# Patient Record
Sex: MALE | Race: WHITE | NOT HISPANIC OR LATINO | Employment: OTHER | URBAN - METROPOLITAN AREA
[De-identification: names, ages, dates, MRNs, and addresses within clinical notes are randomized per-mention and may not be internally consistent; named-entity substitution may affect disease eponyms.]

---

## 2017-02-18 ENCOUNTER — COMMUNICATION - HEALTHEAST (OUTPATIENT)
Dept: CARDIOLOGY | Facility: CLINIC | Age: 77
End: 2017-02-18

## 2017-02-18 DIAGNOSIS — E78.5 HYPERLIPIDEMIA: ICD-10-CM

## 2017-04-03 ENCOUNTER — OFFICE VISIT - HEALTHEAST (OUTPATIENT)
Dept: CARDIOLOGY | Facility: CLINIC | Age: 77
End: 2017-04-03

## 2017-04-03 DIAGNOSIS — I10 ESSENTIAL HYPERTENSION: ICD-10-CM

## 2017-04-03 DIAGNOSIS — I77.9 BILATERAL CAROTID ARTERY DISEASE (H): ICD-10-CM

## 2017-04-03 DIAGNOSIS — R01.1 MURMUR, CARDIAC: ICD-10-CM

## 2017-04-03 ASSESSMENT — MIFFLIN-ST. JEOR: SCORE: 1465.15

## 2017-05-06 ENCOUNTER — COMMUNICATION - HEALTHEAST (OUTPATIENT)
Dept: CARDIOLOGY | Facility: CLINIC | Age: 77
End: 2017-05-06

## 2017-05-06 DIAGNOSIS — I10 ESSENTIAL HYPERTENSION, BENIGN: ICD-10-CM

## 2017-05-16 ENCOUNTER — AMBULATORY - HEALTHEAST (OUTPATIENT)
Dept: LAB | Facility: HOSPITAL | Age: 77
End: 2017-05-16

## 2017-05-16 ENCOUNTER — HOSPITAL ENCOUNTER (OUTPATIENT)
Dept: CARDIOLOGY | Facility: HOSPITAL | Age: 77
Discharge: HOME | End: 2017-05-16
Attending: INTERNAL MEDICINE

## 2017-05-16 DIAGNOSIS — R01.1 MURMUR, CARDIAC: ICD-10-CM

## 2017-05-16 DIAGNOSIS — E78.5 HYPERLIPEMIA: ICD-10-CM

## 2017-05-16 LAB
AORTIC ROOT: 3.3 CM
AORTIC VALVE MEAN VELOCITY: 103 CM/S
ASCENDING AORTA: 3.9 CM
AV DIMENSIONLESS INDEX VTI: 0.8
AV MEAN GRADIENT: 5 MMHG
AV PEAK GRADIENT: 8.1 MMHG
AV VALVE AREA: 2.2 CM2
AV VELOCITY RATIO: 0.8
BSA FOR ECHO PROCEDURE: 1.9 M2
CHOLEST SERPL-MCNC: 193 MG/DL
DOP CALC AO PEAK VEL: 142 CM/S
DOP CALC AO VTI: 35 CM
DOP CALC LVOT AREA: 2.54 CM2
DOP CALC LVOT DIAMETER: 1.8 CM
DOP CALC LVOT PEAK VEL: 115 CM/S
DOP CALC LVOT STROKE VOLUME: 75.3 CM3
DOP CALCLVOT PEAK VEL VTI: 29.6 CM
EJECTION FRACTION: 56 % (ref 55–75)
FASTING STATUS PATIENT QL REPORTED: YES
FRACTIONAL SHORTENING: 32.9 % (ref 28–44)
HDLC SERPL-MCNC: 77 MG/DL
INTERVENTRICULAR SEPTUM IN END DIASTOLE: 1.27 CM (ref 0.6–1)
IVS/PW RATIO: 1.1
LA AREA 1: 24 CM2
LA AREA 2: 20.6 CM2
LDLC SERPL CALC-MCNC: 108 MG/DL
LEFT ATRIUM LENGTH: 5.5 CM
LEFT ATRIUM SIZE: 3.1 CM
LEFT ATRIUM VOLUME INDEX: 40.2 ML/M2
LEFT ATRIUM VOLUME: 76.4 CM3
LEFT VENTRICLE DIASTOLIC VOLUME INDEX: 39.3 CM3/M2 (ref 34–74)
LEFT VENTRICLE DIASTOLIC VOLUME: 74.7 CM3 (ref 62–150)
LEFT VENTRICLE MASS INDEX: 85.4 G/M2
LEFT VENTRICLE SYSTOLIC VOLUME INDEX: 17.2 CM3/M2 (ref 11–31)
LEFT VENTRICLE SYSTOLIC VOLUME: 32.7 CM3 (ref 21–61)
LEFT VENTRICULAR INTERNAL DIMENSION IN DIASTOLE: 3.98 CM (ref 4.2–5.8)
LEFT VENTRICULAR INTERNAL DIMENSION IN SYSTOLE: 2.67 CM (ref 2.5–4)
LEFT VENTRICULAR MASS: 162.2 G
LEFT VENTRICULAR OUTFLOW TRACT MEAN GRADIENT: 3 MMHG
LEFT VENTRICULAR OUTFLOW TRACT MEAN VELOCITY: 84.3 CM/S
LEFT VENTRICULAR OUTFLOW TRACT PEAK GRADIENT: 5 MMHG
LEFT VENTRICULAR POSTERIOR WALL IN END DIASTOLE: 1.11 CM (ref 0.6–1)
LV STROKE VOLUME INDEX: 39.6 ML/M2
MITRAL VALVE E/A RATIO: 0.9
MV AVERAGE E/E' RATIO: 9.9 CM/S
MV DECELERATION TIME: 299 MS
MV E'TISSUE VEL-LAT: 11 CM/S
MV E'TISSUE VEL-MED: 7.16 CM/S
MV LATERAL E/E' RATIO: 8.2
MV MEDIAL E/E' RATIO: 12.5
MV PEAK A VELOCITY: 102 CM/S
MV PEAK E VELOCITY: 89.7 CM/S
PR MAX PG: 5 MMHG
PR PEAK VELOCITY: 113 CM/S
PV PEAK GRADIENT: 6.7 MMHG
PV VMAX: 129 CM/S
TRICUSPID REGURGITATION PEAK PRESSURE GRADIENT: 24.4 MMHG
TRICUSPID VALVE ANULAR PLANE SYSTOLIC EXCURSION: 1.8 CM
TRICUSPID VALVE PEAK REGURGITANT VELOCITY: 247 CM/S
TRIGL SERPL-MCNC: 38 MG/DL

## 2017-06-28 ENCOUNTER — COMMUNICATION - HEALTHEAST (OUTPATIENT)
Dept: CARDIOLOGY | Facility: CLINIC | Age: 77
End: 2017-06-28

## 2017-06-28 DIAGNOSIS — E78.5 HYPERLIPIDEMIA: ICD-10-CM

## 2017-10-15 ENCOUNTER — COMMUNICATION - HEALTHEAST (OUTPATIENT)
Dept: CARDIOLOGY | Facility: CLINIC | Age: 77
End: 2017-10-15

## 2017-10-15 DIAGNOSIS — I10 HTN (HYPERTENSION): ICD-10-CM

## 2018-03-27 ENCOUNTER — COMMUNICATION - HEALTHEAST (OUTPATIENT)
Dept: CARDIOLOGY | Facility: CLINIC | Age: 78
End: 2018-03-27

## 2018-03-27 DIAGNOSIS — I10 HTN (HYPERTENSION): ICD-10-CM

## 2018-04-23 ENCOUNTER — COMMUNICATION - HEALTHEAST (OUTPATIENT)
Dept: ADMINISTRATIVE | Facility: CLINIC | Age: 78
End: 2018-04-23

## 2018-07-16 ENCOUNTER — OFFICE VISIT - HEALTHEAST (OUTPATIENT)
Dept: CARDIOLOGY | Facility: CLINIC | Age: 78
End: 2018-07-16

## 2018-07-16 ENCOUNTER — AMBULATORY - HEALTHEAST (OUTPATIENT)
Dept: CARDIOLOGY | Facility: CLINIC | Age: 78
End: 2018-07-16

## 2018-07-16 DIAGNOSIS — I77.9 BILATERAL CAROTID ARTERY DISEASE (H): ICD-10-CM

## 2018-07-16 DIAGNOSIS — I10 ESSENTIAL HYPERTENSION: ICD-10-CM

## 2018-07-16 DIAGNOSIS — R55 SYNCOPE, UNSPECIFIED SYNCOPE TYPE: ICD-10-CM

## 2018-07-16 DIAGNOSIS — R55 SYNCOPE AND COLLAPSE: ICD-10-CM

## 2018-07-16 ASSESSMENT — MIFFLIN-ST. JEOR: SCORE: 1478.76

## 2018-07-20 ENCOUNTER — HOSPITAL ENCOUNTER (OUTPATIENT)
Dept: ULTRASOUND IMAGING | Facility: HOSPITAL | Age: 78
Discharge: HOME | End: 2018-07-20
Attending: INTERNAL MEDICINE

## 2018-07-20 ENCOUNTER — AMBULATORY - HEALTHEAST (OUTPATIENT)
Dept: LAB | Facility: HOSPITAL | Age: 78
End: 2018-07-20

## 2018-07-20 DIAGNOSIS — R55 SYNCOPE AND COLLAPSE: ICD-10-CM

## 2018-07-20 DIAGNOSIS — I10 ESSENTIAL HYPERTENSION: ICD-10-CM

## 2018-07-20 DIAGNOSIS — I77.9 BILATERAL CAROTID ARTERY DISEASE (H): ICD-10-CM

## 2018-07-20 LAB
CHOLEST SERPL-MCNC: 213 MG/DL
FASTING STATUS PATIENT QL REPORTED: YES
HDLC SERPL-MCNC: 67 MG/DL
LDLC SERPL CALC-MCNC: 132 MG/DL
TRIGL SERPL-MCNC: 69 MG/DL

## 2018-07-23 ENCOUNTER — COMMUNICATION - HEALTHEAST (OUTPATIENT)
Dept: CARDIOLOGY | Facility: CLINIC | Age: 78
End: 2018-07-23

## 2018-07-23 DIAGNOSIS — E78.5 DYSLIPIDEMIA (HIGH LDL; LOW HDL): ICD-10-CM

## 2018-11-02 ENCOUNTER — COMMUNICATION - HEALTHEAST (OUTPATIENT)
Dept: CARDIOLOGY | Facility: CLINIC | Age: 78
End: 2018-11-02

## 2018-11-02 DIAGNOSIS — I10 ESSENTIAL HYPERTENSION, BENIGN: ICD-10-CM

## 2019-05-21 ENCOUNTER — COMMUNICATION - HEALTHEAST (OUTPATIENT)
Dept: CARDIOLOGY | Facility: CLINIC | Age: 79
End: 2019-05-21

## 2019-05-21 DIAGNOSIS — I10 ESSENTIAL HYPERTENSION, BENIGN: ICD-10-CM

## 2019-05-23 ENCOUNTER — COMMUNICATION - HEALTHEAST (OUTPATIENT)
Dept: CARDIOLOGY | Facility: CLINIC | Age: 79
End: 2019-05-23

## 2019-05-23 DIAGNOSIS — I10 ESSENTIAL HYPERTENSION, BENIGN: ICD-10-CM

## 2019-07-26 ENCOUNTER — COMMUNICATION - HEALTHEAST (OUTPATIENT)
Dept: ADMINISTRATIVE | Facility: CLINIC | Age: 79
End: 2019-07-26

## 2021-05-30 VITALS — WEIGHT: 178 LBS | HEIGHT: 66 IN | BODY MASS INDEX: 28.61 KG/M2

## 2021-06-01 VITALS — WEIGHT: 181 LBS | BODY MASS INDEX: 29.09 KG/M2 | HEIGHT: 66 IN

## 2021-06-15 PROBLEM — R01.1 MURMUR, CARDIAC: Status: ACTIVE | Noted: 2017-04-03

## 2021-06-16 PROBLEM — R55 SYNCOPE, UNSPECIFIED SYNCOPE TYPE: Status: ACTIVE | Noted: 2018-07-16

## 2021-06-19 NOTE — LETTER
Letter by Nikolay Olson MD at      Author: Nikolay Olson MD Service: -- Author Type: --    Filed:  Encounter Date: 7/26/2019 Status: (Other)         Andrew Diazdelroy  [address] [facility] [address]      July 26, 2019      Dear Andrew,    This letter is to remind you that you will be due for your follow up appointment with Dr. Nikolay Olson  . To help ensure you are in the best health possible, a regular follow-up with your cardiologist is essential.     Please call our Patient Scheduling Line at [phone] to schedule your appointment at your earliest convenience.  If you have recently scheduled an appointment, please disregard this letter.    We look forward to seeing you again. As always, we are available at the number  above for any questions or concerns you may have.      Sincerely,     The Physicians and Staff of [facility]

## 2021-06-20 ENCOUNTER — HEALTH MAINTENANCE LETTER (OUTPATIENT)
Age: 81
End: 2021-06-20

## 2021-06-25 NOTE — PROGRESS NOTES
Progress Notes by Nikolay Olson MD at 4/3/2017  7:50 AM     Author: Nikolay Olson MD Service: -- Author Type: Physician    Filed: 4/3/2017  8:13 AM Encounter Date: 4/3/2017 Status: Signed    : Nikolay Olson MD (Physician)           Click to link to [facility] HEART CARE NOTE    Thank you, Dr. Domingo, for asking us to see Andrew Haines at the [facility].      Assessment/Recommendations   She with known hypertension as well as carotid plaque.  His blood pressures a bit high today and we talked about 3 options.  Option one would be to increase his lisinopril.  Option 2 would be to increase his physical activity and call us with some blood pressures in 6 weeks.  Option 3 would be to increase the lisinopril only if his blood pressure does not come down to be consistently below 140 in the next 6 weeks.  He opted for option 1 and will increase his physical activity.  He will call us in about 6 weeks with some blood pressures that we can respond to at that time.    I also heard a new systolic murmur on his physical exam today and we will obtain an echocardiogram to evaluate this.  He will be back in [place] in May which would be a good timing to get his blood pressures at that time as well.    When we get the echocardiogram we will also look for his most recent lipid panel.    Thank you for allowing us to participate in his care.         History of Present Illness    Mr. Andrew Haines is a 76 y.o. male with known hypertension as well as mild carotid plaquing in the past.  We have been treating him with pravastatin as well as antihypertensive agents.  He has been feeling well.  He plays golf on a regular basis without difficulty.  He can walk and lift light weights without difficulty.  He has not noticed any changes in his functional capacity.  He denies chest discomfort, unusual shortness of breath with activity, orthopnea, paroxysmal nocturnal  dyspnea, peripheral edema, syncope or near syncopal episodes.  He denies palpitations or chest discomfort.  He does not recall when he last had a carotid ultrasound but thinks it was done in the [place] area.  He has not been as active as he has in the past and is picked up a few pounds.         Physical Examination Review of Systems   Vitals:    04/03/17 0748   BP: 148/78   Pulse: 100   Resp: 16     Body mass index is 28.73 kg/(m^2).  Wt Readings from Last 3 Encounters:   04/03/17 178 lb (80.7 kg)   01/11/16 164 lb (74.4 kg)   12/15/14 177 lb (80.3 kg)     General Appearance:   Alert, cooperative and in no acute distress.   ENT/Mouth: Oral mucuos membranes pink and moist .      EYES:  No scleral icterus. No Xanthelasma.    Neck: JVP normal. No Hepatojugular reflux. Thyroid not visualized   Chest/Lungs:   Lungs are clear to auscultation, equal chest wall expansion    Cardiovascular:   S1, S2 with 2/6 systolic murmur , no clicks or rubs. Brachial, radial and posterior tibial pulses are intact and symetric. No carotid bruits noted   Abdomen:  Nontender. BS+. No bruits.      Extremities: No cyanosis, clubbing or edema   Skin: no xanthelasma, warm.    Psych: Appropriate affect.   Neurologic: normal gait, normal  bilateral, no tremors        General: WNL  Eyes: WNL  Ears/Nose/Throat: WNL  Lungs: WNL  Heart: WNL  Stomach: WNL  Bladder: WNL  Muscle/Joints: Joint Pain  Skin: WNL  Nervous System: WNL  Mental Health: WNL     Blood: WNL     Medical History  Surgical History Family History Social History   History reviewed. No pertinent past medical history. History reviewed. No pertinent surgical history. History reviewed. No pertinent family history. Social History     Social History   ? Marital status:      Spouse name: N/A   ? Number of children: N/A   ? Years of education: N/A     Occupational History   ? Not on file.     Social History Main Topics   ? Smoking status: Former Smoker   ? Smokeless tobacco:  Never Used   ? Alcohol use Not on file   ? Drug use: No   ? Sexual activity: Not on file     Other Topics Concern   ? Not on file     Social History Narrative          Medications  Allergies   Current Outpatient Prescriptions   Medication Sig Dispense Refill   ? aspirin (ASPIRIN LOW DOSE) 81 MG EC tablet Take 81 mg by mouth daily.     ? calcium carbonate-vitamin D3 (CALCIUM 600 + D,3,) 600 mg calcium- 200 unit cap Take 2 tablets by mouth daily.      ? ibuprofen (ADVIL,MOTRIN) 200 MG tablet Take 200 mg by mouth every 6 (six) hours as needed for pain.     ? lisinopril (PRINIVIL,ZESTRIL) 10 MG tablet TAKE 1 TABLET (10 MG TOTAL) BY MOUTH DAILY. 90 tablet 0   ? omeprazole (PRILOSEC) 20 MG capsule Take 1 capsule by mouth as needed.  1   ? pravastatin (PRAVACHOL) 40 MG tablet TAKE 1 TABLET (40 MG TOTAL) BY MOUTH BEDTIME. 90 tablet 0     No current facility-administered medications for this visit.       Allergies   Allergen Reactions   ? Penicillins          Lab Results    Chemistry/lipid CBC Cardiac Enzymes/BNP/TSH/INR   Lab Results   Component Value Date    CHOL 217 (H) 12/05/2016    HDL 83 12/05/2016    LDLCALC 122 12/05/2016    TRIG 60 12/05/2016    CREATININE 1.00 12/27/2012    BUN 20 12/27/2012    K 5.0 12/27/2012     12/27/2012     12/27/2012    CO2 24 12/27/2012    No results found for: WBC, HGB, HCT, MCV, PLT No results found for: CKTOTAL, CKMB, CKMBINDEX, TROPONINI, BNP, TSH, INR

## 2021-06-26 NOTE — PROGRESS NOTES
Progress Notes by Nikolay Olson MD at 7/16/2018  8:50 AM     Author: Nikolay Olson MD Service: -- Author Type: Physician    Filed: 7/16/2018  9:18 AM Encounter Date: 7/16/2018 Status: Signed    : Nikolay Olson MD (Physician)           Click to link to [facility] HEART CARE NOTE    Thank you, Dr. Domingo, for asking us to see Andrew Haines at the [facility].      Assessment/Recommendations   Patient with known carotid artery disease, hypertension and on repeat check today is closer to goal.  His blood pressures at home of generally been well under 140 and many in the 120s.  Picking up his exercise at think his blood pressure will continue to improve.    He is not on any statin medication at this time and we will recheck a fasting lipid panel in the near future.  Because of his history of carotid disease, we will recheck a carotid ultrasound and because of his syncopal episode we will check a Holter monitor.  I would also like to get a 12-lead ECG.    We will call him with results of the above and any further recommendations.  He will continue to exercise on a regular basis and I have commended him for this.         History of Present Illness    Mr. Andrew Haines is a 77 y.o. male with known history of mild plaquing in the carotid arteries, hypertension, and being treated for elevated cholesterol.  He stopped his pravastatin because he was just having a lot of tightening up of his lower extremities and found it difficult to walk.  After several weeks to a couple of months this feeling went away.  He struggled over the past year with a nonhealing ulcer which finally healed in May of this year.  It took a year.  It was quite dramatic.  He did not exercise at that time because he was told not to exercise.  He picked up some weight.  He is now started exercise and uses a , goes to fitness gym, and plays tennis and golf.  He gets an exercise  at least 4 times each week at this point in time.  He has not had a recent lipid panel.  He has not had a carotid ultrasound that I can see since 2012.    He denies any chest discomfort, orthopnea, paroxysmal nocturnal dyspnea but does get some peripheral edema in his right lower extremity, the extremity with the ulcer that is now healed.  He has not had any palpitations.  He did have some falls while in [place].  2 falls were tripping and one he feels like he just passed out.  He was evaluated in [place] and nothing was found.  He does not recall having a monitor of any type.  He has not had any recurrent syncopal or near syncopal episodes since being back to [place].         Physical Examination Review of Systems   Vitals:    07/16/18 0905   BP: 142/78   Pulse:    Resp:      Body mass index is 29.21 kg/(m^2).  Wt Readings from Last 3 Encounters:   07/16/18 181 lb (82.1 kg)   04/03/17 178 lb (80.7 kg)   01/11/16 164 lb (74.4 kg)     General Appearance:   Alert, cooperative and in no acute distress.   ENT/Mouth: Oral mucuos membranes pink and moist .      EYES:  No scleral icterus. No Xanthelasma.    Neck: JVP normal. No Hepatojugular reflux. Thyroid not visualized   Chest/Lungs:   Lungs are clear to auscultation, equal chest wall expansion    Cardiovascular:   S1, S2 without murmur ,clicks or rubs. Brachial, radial pulses are intact and symetric. No carotid bruits noted   Abdomen:  Nontender. BS+.       Extremities: No cyanosis, clubbing in the right lower extremity pretibial edema is noted   Skin: no xanthelasma, warm.    Psych: Appropriate affect.   Neurologic: normal gait, normal  bilateral, no tremors        General: WNL  Eyes: WNL  Ears/Nose/Throat: WNL  Lungs: WNL  Heart: Leg Swelling  Stomach: WNL  Bladder: WNL  Muscle/Joints: WNL  Skin: WNL  Nervous System: Falls  Mental Health: WNL     Blood: WNL     Medical History  Surgical History Family History Social History   No past medical history  on file. No past surgical history on file. No family history on file. Social History     Social History   ? Marital status:      Spouse name: N/A   ? Number of children: N/A   ? Years of education: N/A     Occupational History   ? Not on file.     Social History Main Topics   ? Smoking status: Former Smoker   ? Smokeless tobacco: Never Used   ? Alcohol use Not on file   ? Drug use: No   ? Sexual activity: Not on file     Other Topics Concern   ? Not on file     Social History Narrative          Medications  Allergies   Current Outpatient Prescriptions   Medication Sig Dispense Refill   ? calcium carbonate-vitamin D3 (CALCIUM 600 + D,3,) 600 mg calcium- 200 unit cap Take 2 tablets by mouth daily.      ? celecoxib (CELEBREX) 50 MG capsule Take 100 mg by mouth daily.  30   ? lisinopril (PRINIVIL,ZESTRIL) 10 MG tablet TAKE 1 TABLET (10 MG TOTAL) BY MOUTH DAILY. 90 tablet 0   ? omeprazole (PRILOSEC) 20 MG capsule Take 1 capsule by mouth as needed.  1   ? aspirin (ASPIRIN LOW DOSE) 81 MG EC tablet Take 81 mg by mouth daily.     ? ibuprofen (ADVIL,MOTRIN) 200 MG tablet Take 200 mg by mouth every 6 (six) hours as needed for pain.     ? pravastatin (PRAVACHOL) 40 MG tablet TAKE 1 TABLET (40 MG TOTAL) BY MOUTH BEDTIME. 90 tablet 0     No current facility-administered medications for this visit.       Allergies   Allergen Reactions   ? Penicillins          Lab Results    Chemistry/lipid CBC Cardiac Enzymes/BNP/TSH/INR   Lab Results   Component Value Date    CHOL 193 05/16/2017    HDL 77 05/16/2017    LDLCALC 108 05/16/2017    TRIG 38 05/16/2017    CREATININE 1.00 12/27/2012    BUN 20 12/27/2012    K 5.0 12/27/2012     12/27/2012     12/27/2012    CO2 24 12/27/2012    No results found for: WBC, HGB, HCT, MCV, PLT No results found for: CKTOTAL, CKMB, CKMBINDEX, TROPONINI, BNP, TSH, INR

## 2021-10-10 ENCOUNTER — HEALTH MAINTENANCE LETTER (OUTPATIENT)
Age: 81
End: 2021-10-10

## 2022-07-17 ENCOUNTER — HEALTH MAINTENANCE LETTER (OUTPATIENT)
Age: 82
End: 2022-07-17

## 2022-09-24 ENCOUNTER — HEALTH MAINTENANCE LETTER (OUTPATIENT)
Age: 82
End: 2022-09-24

## 2023-08-05 ENCOUNTER — HEALTH MAINTENANCE LETTER (OUTPATIENT)
Age: 83
End: 2023-08-05

## 2024-07-07 ENCOUNTER — HOSPITAL ENCOUNTER (INPATIENT)
Facility: HOSPITAL | Age: 84
LOS: 9 days | Discharge: NURSING, REHAB OR LONG-TERM CARE | DRG: 101 | End: 2024-07-16
Attending: EMERGENCY MEDICINE | Admitting: STUDENT IN AN ORGANIZED HEALTH CARE EDUCATION/TRAINING PROGRAM
Payer: COMMERCIAL

## 2024-07-07 ENCOUNTER — APPOINTMENT (OUTPATIENT)
Dept: CT IMAGING | Facility: HOSPITAL | Age: 84
DRG: 101 | End: 2024-07-07
Attending: EMERGENCY MEDICINE
Payer: COMMERCIAL

## 2024-07-07 DIAGNOSIS — R46.89 SPELL OF BEHAVIOR CHANGE: ICD-10-CM

## 2024-07-07 PROBLEM — G40.209 PARTIAL SYMPTOMATIC EPILEPSY WITH COMPLEX PARTIAL SEIZURES, NOT INTRACTABLE, WITHOUT STATUS EPILEPTICUS (H): Status: ACTIVE | Noted: 2020-10-23

## 2024-07-07 PROBLEM — I44.2 THIRD DEGREE HEART BLOCK (H): Status: ACTIVE | Noted: 2020-07-02

## 2024-07-07 PROBLEM — I48.0 PAROXYSMAL ATRIAL FIBRILLATION (H): Status: ACTIVE | Noted: 2021-06-14

## 2024-07-07 LAB
ANION GAP SERPL CALCULATED.3IONS-SCNC: 10 MMOL/L (ref 7–15)
APTT PPP: 24 SECONDS (ref 22–38)
BASOPHILS # BLD AUTO: 0.1 10E3/UL (ref 0–0.2)
BASOPHILS NFR BLD AUTO: 1 %
BUN SERPL-MCNC: 18.9 MG/DL (ref 8–23)
CALCIUM SERPL-MCNC: 9.1 MG/DL (ref 8.8–10.2)
CHLORIDE SERPL-SCNC: 98 MMOL/L (ref 98–107)
CHOLEST SERPL-MCNC: 133 MG/DL
CREAT SERPL-MCNC: 1.17 MG/DL (ref 0.67–1.17)
DEPRECATED HCO3 PLAS-SCNC: 25 MMOL/L (ref 22–29)
EGFRCR SERPLBLD CKD-EPI 2021: 62 ML/MIN/1.73M2
EOSINOPHIL # BLD AUTO: 0.8 10E3/UL (ref 0–0.7)
EOSINOPHIL NFR BLD AUTO: 10 %
ERYTHROCYTE [DISTWIDTH] IN BLOOD BY AUTOMATED COUNT: 13.2 % (ref 10–15)
GLUCOSE BLDC GLUCOMTR-MCNC: 101 MG/DL (ref 70–99)
GLUCOSE SERPL-MCNC: 98 MG/DL (ref 70–99)
HBA1C MFR BLD: 5.8 %
HCT VFR BLD AUTO: 41.8 % (ref 40–53)
HDLC SERPL-MCNC: 52 MG/DL
HGB BLD-MCNC: 14 G/DL (ref 13.3–17.7)
IMM GRANULOCYTES # BLD: 0 10E3/UL
IMM GRANULOCYTES NFR BLD: 0 %
INR PPP: 1.02 (ref 0.85–1.15)
LDLC SERPL CALC-MCNC: 71 MG/DL
LEVETIRACETAM SERPL-MCNC: 28.5 ΜG/ML (ref 10–40)
LYMPHOCYTES # BLD AUTO: 2.7 10E3/UL (ref 0.8–5.3)
LYMPHOCYTES NFR BLD AUTO: 32 %
MCH RBC QN AUTO: 31.2 PG (ref 26.5–33)
MCHC RBC AUTO-ENTMCNC: 33.5 G/DL (ref 31.5–36.5)
MCV RBC AUTO: 93 FL (ref 78–100)
MONOCYTES # BLD AUTO: 0.8 10E3/UL (ref 0–1.3)
MONOCYTES NFR BLD AUTO: 10 %
NEUTROPHILS # BLD AUTO: 3.9 10E3/UL (ref 1.6–8.3)
NEUTROPHILS NFR BLD AUTO: 47 %
NONHDLC SERPL-MCNC: 81 MG/DL
NRBC # BLD AUTO: 0 10E3/UL
NRBC BLD AUTO-RTO: 0 /100
PLATELET # BLD AUTO: 203 10E3/UL (ref 150–450)
POTASSIUM SERPL-SCNC: 4.7 MMOL/L (ref 3.4–5.3)
RBC # BLD AUTO: 4.49 10E6/UL (ref 4.4–5.9)
SODIUM SERPL-SCNC: 133 MMOL/L (ref 135–145)
TRIGL SERPL-MCNC: 50 MG/DL
TROPONIN T SERPL HS-MCNC: 16 NG/L
WBC # BLD AUTO: 8.4 10E3/UL (ref 4–11)

## 2024-07-07 PROCEDURE — 83036 HEMOGLOBIN GLYCOSYLATED A1C: CPT | Performed by: STUDENT IN AN ORGANIZED HEALTH CARE EDUCATION/TRAINING PROGRAM

## 2024-07-07 PROCEDURE — 99223 1ST HOSP IP/OBS HIGH 75: CPT | Performed by: STUDENT IN AN ORGANIZED HEALTH CARE EDUCATION/TRAINING PROGRAM

## 2024-07-07 PROCEDURE — 250N000009 HC RX 250: Performed by: EMERGENCY MEDICINE

## 2024-07-07 PROCEDURE — 120N000001 HC R&B MED SURG/OB

## 2024-07-07 PROCEDURE — 70496 CT ANGIOGRAPHY HEAD: CPT | Mod: MA

## 2024-07-07 PROCEDURE — 93005 ELECTROCARDIOGRAM TRACING: CPT | Performed by: EMERGENCY MEDICINE

## 2024-07-07 PROCEDURE — 85730 THROMBOPLASTIN TIME PARTIAL: CPT | Performed by: EMERGENCY MEDICINE

## 2024-07-07 PROCEDURE — 82962 GLUCOSE BLOOD TEST: CPT

## 2024-07-07 PROCEDURE — 84484 ASSAY OF TROPONIN QUANT: CPT | Performed by: EMERGENCY MEDICINE

## 2024-07-07 PROCEDURE — 85025 COMPLETE CBC W/AUTO DIFF WBC: CPT | Performed by: EMERGENCY MEDICINE

## 2024-07-07 PROCEDURE — 80048 BASIC METABOLIC PNL TOTAL CA: CPT | Performed by: EMERGENCY MEDICINE

## 2024-07-07 PROCEDURE — 80061 LIPID PANEL: CPT | Performed by: STUDENT IN AN ORGANIZED HEALTH CARE EDUCATION/TRAINING PROGRAM

## 2024-07-07 PROCEDURE — 36415 COLL VENOUS BLD VENIPUNCTURE: CPT | Performed by: EMERGENCY MEDICINE

## 2024-07-07 PROCEDURE — 80177 DRUG SCRN QUAN LEVETIRACETAM: CPT | Performed by: EMERGENCY MEDICINE

## 2024-07-07 PROCEDURE — 250N000011 HC RX IP 250 OP 636: Performed by: EMERGENCY MEDICINE

## 2024-07-07 PROCEDURE — 99291 CRITICAL CARE FIRST HOUR: CPT | Mod: 25

## 2024-07-07 PROCEDURE — 85610 PROTHROMBIN TIME: CPT | Performed by: EMERGENCY MEDICINE

## 2024-07-07 RX ORDER — ASPIRIN 325 MG
325 TABLET ORAL DAILY
Status: DISCONTINUED | OUTPATIENT
Start: 2024-07-08 | End: 2024-07-16 | Stop reason: HOSPADM

## 2024-07-07 RX ORDER — PANTOPRAZOLE SODIUM 40 MG/1
40 TABLET, DELAYED RELEASE ORAL DAILY PRN
Status: DISCONTINUED | OUTPATIENT
Start: 2024-07-07 | End: 2024-07-16 | Stop reason: HOSPADM

## 2024-07-07 RX ORDER — IOPAMIDOL 755 MG/ML
67 INJECTION, SOLUTION INTRAVASCULAR ONCE
Status: COMPLETED | OUTPATIENT
Start: 2024-07-07 | End: 2024-07-07

## 2024-07-07 RX ORDER — LEVETIRACETAM 500 MG/1
1000 TABLET ORAL 2 TIMES DAILY
Status: DISCONTINUED | OUTPATIENT
Start: 2024-07-08 | End: 2024-07-16 | Stop reason: HOSPADM

## 2024-07-07 RX ORDER — LEVETIRACETAM 1000 MG/1
1000 TABLET ORAL 2 TIMES DAILY
COMMUNITY

## 2024-07-07 RX ORDER — METOPROLOL SUCCINATE 50 MG/1
50 TABLET, EXTENDED RELEASE ORAL DAILY
Status: DISCONTINUED | OUTPATIENT
Start: 2024-07-08 | End: 2024-07-16 | Stop reason: HOSPADM

## 2024-07-07 RX ORDER — ONDANSETRON 2 MG/ML
4 INJECTION INTRAMUSCULAR; INTRAVENOUS EVERY 6 HOURS PRN
Status: DISCONTINUED | OUTPATIENT
Start: 2024-07-07 | End: 2024-07-16 | Stop reason: HOSPADM

## 2024-07-07 RX ORDER — ONDANSETRON 4 MG/1
4 TABLET, ORALLY DISINTEGRATING ORAL EVERY 6 HOURS PRN
Status: DISCONTINUED | OUTPATIENT
Start: 2024-07-07 | End: 2024-07-16 | Stop reason: HOSPADM

## 2024-07-07 RX ORDER — LIDOCAINE 40 MG/G
CREAM TOPICAL
Status: DISCONTINUED | OUTPATIENT
Start: 2024-07-07 | End: 2024-07-16 | Stop reason: HOSPADM

## 2024-07-07 RX ORDER — ROSUVASTATIN CALCIUM 10 MG/1
10 TABLET, COATED ORAL DAILY
Status: DISCONTINUED | OUTPATIENT
Start: 2024-07-08 | End: 2024-07-16 | Stop reason: HOSPADM

## 2024-07-07 RX ORDER — HYDRALAZINE HYDROCHLORIDE 20 MG/ML
5 INJECTION INTRAMUSCULAR; INTRAVENOUS EVERY 4 HOURS PRN
Status: DISCONTINUED | OUTPATIENT
Start: 2024-07-07 | End: 2024-07-16 | Stop reason: HOSPADM

## 2024-07-07 RX ADMIN — SODIUM CHLORIDE 100 ML: 9 INJECTION, SOLUTION INTRAVENOUS at 19:29

## 2024-07-07 RX ADMIN — IOPAMIDOL 67 ML: 755 INJECTION, SOLUTION INTRAVENOUS at 19:28

## 2024-07-07 ASSESSMENT — ACTIVITIES OF DAILY LIVING (ADL)
ADLS_ACUITY_SCORE: 35

## 2024-07-07 ASSESSMENT — COLUMBIA-SUICIDE SEVERITY RATING SCALE - C-SSRS
6. HAVE YOU EVER DONE ANYTHING, STARTED TO DO ANYTHING, OR PREPARED TO DO ANYTHING TO END YOUR LIFE?: NO
2. HAVE YOU ACTUALLY HAD ANY THOUGHTS OF KILLING YOURSELF IN THE PAST MONTH?: NO
1. IN THE PAST MONTH, HAVE YOU WISHED YOU WERE DEAD OR WISHED YOU COULD GO TO SLEEP AND NOT WAKE UP?: NO

## 2024-07-08 ENCOUNTER — APPOINTMENT (OUTPATIENT)
Dept: CARDIOLOGY | Facility: HOSPITAL | Age: 84
DRG: 101 | End: 2024-07-08
Attending: STUDENT IN AN ORGANIZED HEALTH CARE EDUCATION/TRAINING PROGRAM
Payer: COMMERCIAL

## 2024-07-08 ENCOUNTER — TRANSFERRED RECORDS (OUTPATIENT)
Dept: HEALTH INFORMATION MANAGEMENT | Facility: CLINIC | Age: 84
End: 2024-07-08

## 2024-07-08 ENCOUNTER — APPOINTMENT (OUTPATIENT)
Dept: MRI IMAGING | Facility: HOSPITAL | Age: 84
DRG: 101 | End: 2024-07-08
Attending: STUDENT IN AN ORGANIZED HEALTH CARE EDUCATION/TRAINING PROGRAM
Payer: COMMERCIAL

## 2024-07-08 ENCOUNTER — APPOINTMENT (OUTPATIENT)
Dept: NEUROLOGY | Facility: HOSPITAL | Age: 84
DRG: 101 | End: 2024-07-08
Attending: STUDENT IN AN ORGANIZED HEALTH CARE EDUCATION/TRAINING PROGRAM
Payer: COMMERCIAL

## 2024-07-08 ENCOUNTER — DOCUMENTATION ONLY (OUTPATIENT)
Dept: CARDIOLOGY | Facility: CLINIC | Age: 84
End: 2024-07-08

## 2024-07-08 ENCOUNTER — APPOINTMENT (OUTPATIENT)
Dept: SPEECH THERAPY | Facility: HOSPITAL | Age: 84
DRG: 101 | End: 2024-07-08
Attending: STUDENT IN AN ORGANIZED HEALTH CARE EDUCATION/TRAINING PROGRAM
Payer: COMMERCIAL

## 2024-07-08 LAB
ANION GAP SERPL CALCULATED.3IONS-SCNC: 8 MMOL/L (ref 7–15)
ATRIAL RATE - MUSE: 69 BPM
BUN SERPL-MCNC: 16.1 MG/DL (ref 8–23)
CALCIUM SERPL-MCNC: 8.6 MG/DL (ref 8.8–10.2)
CHLORIDE SERPL-SCNC: 99 MMOL/L (ref 98–107)
CREAT SERPL-MCNC: 1.17 MG/DL (ref 0.67–1.17)
DEPRECATED HCO3 PLAS-SCNC: 27 MMOL/L (ref 22–29)
DIASTOLIC BLOOD PRESSURE - MUSE: 82 MMHG
EGFRCR SERPLBLD CKD-EPI 2021: 62 ML/MIN/1.73M2
ERYTHROCYTE [DISTWIDTH] IN BLOOD BY AUTOMATED COUNT: 13 % (ref 10–15)
GLUCOSE BLDC GLUCOMTR-MCNC: 101 MG/DL (ref 70–99)
GLUCOSE BLDC GLUCOMTR-MCNC: 113 MG/DL (ref 70–99)
GLUCOSE BLDC GLUCOMTR-MCNC: 115 MG/DL (ref 70–99)
GLUCOSE BLDC GLUCOMTR-MCNC: 119 MG/DL (ref 70–99)
GLUCOSE SERPL-MCNC: 120 MG/DL (ref 70–99)
HCT VFR BLD AUTO: 40.4 % (ref 40–53)
HGB BLD-MCNC: 13.5 G/DL (ref 13.3–17.7)
INTERPRETATION ECG - MUSE: NORMAL
LVEF ECHO: NORMAL
MCH RBC QN AUTO: 31 PG (ref 26.5–33)
MCHC RBC AUTO-ENTMCNC: 33.4 G/DL (ref 31.5–36.5)
MCV RBC AUTO: 93 FL (ref 78–100)
OSMOLALITY UR: 638 MMOL/KG (ref 100–1200)
P AXIS - MUSE: 17 DEGREES
PLATELET # BLD AUTO: 195 10E3/UL (ref 150–450)
POTASSIUM SERPL-SCNC: 4.4 MMOL/L (ref 3.4–5.3)
PR INTERVAL - MUSE: 192 MS
QRS DURATION - MUSE: 154 MS
QT - MUSE: 424 MS
QTC - MUSE: 454 MS
R AXIS - MUSE: -70 DEGREES
RBC # BLD AUTO: 4.35 10E6/UL (ref 4.4–5.9)
SODIUM SERPL-SCNC: 134 MMOL/L (ref 135–145)
SODIUM UR-SCNC: 88 MMOL/L
SYSTOLIC BLOOD PRESSURE - MUSE: 159 MMHG
T AXIS - MUSE: 99 DEGREES
TSH SERPL DL<=0.005 MIU/L-ACNC: 2.28 UIU/ML (ref 0.3–4.2)
VENTRICULAR RATE- MUSE: 69 BPM
WBC # BLD AUTO: 8.2 10E3/UL (ref 4–11)

## 2024-07-08 PROCEDURE — 95813 EEG EXTND MNTR 61-119 MIN: CPT | Mod: 26 | Performed by: PSYCHIATRY & NEUROLOGY

## 2024-07-08 PROCEDURE — 84443 ASSAY THYROID STIM HORMONE: CPT | Performed by: INTERNAL MEDICINE

## 2024-07-08 PROCEDURE — 36415 COLL VENOUS BLD VENIPUNCTURE: CPT | Performed by: STUDENT IN AN ORGANIZED HEALTH CARE EDUCATION/TRAINING PROGRAM

## 2024-07-08 PROCEDURE — 250N000013 HC RX MED GY IP 250 OP 250 PS 637: Performed by: STUDENT IN AN ORGANIZED HEALTH CARE EDUCATION/TRAINING PROGRAM

## 2024-07-08 PROCEDURE — 93306 TTE W/DOPPLER COMPLETE: CPT | Mod: 26 | Performed by: INTERNAL MEDICINE

## 2024-07-08 PROCEDURE — 255N000002 HC RX 255 OP 636: Performed by: INTERNAL MEDICINE

## 2024-07-08 PROCEDURE — 80048 BASIC METABOLIC PNL TOTAL CA: CPT | Performed by: STUDENT IN AN ORGANIZED HEALTH CARE EDUCATION/TRAINING PROGRAM

## 2024-07-08 PROCEDURE — C8929 TTE W OR WO FOL WCON,DOPPLER: HCPCS

## 2024-07-08 PROCEDURE — 99205 OFFICE O/P NEW HI 60 MIN: CPT | Performed by: PSYCHIATRY & NEUROLOGY

## 2024-07-08 PROCEDURE — 82962 GLUCOSE BLOOD TEST: CPT

## 2024-07-08 PROCEDURE — 70553 MRI BRAIN STEM W/O & W/DYE: CPT | Mod: MG

## 2024-07-08 PROCEDURE — 120N000001 HC R&B MED SURG/OB

## 2024-07-08 PROCEDURE — 85027 COMPLETE CBC AUTOMATED: CPT | Performed by: STUDENT IN AN ORGANIZED HEALTH CARE EDUCATION/TRAINING PROGRAM

## 2024-07-08 PROCEDURE — 99417 PROLNG OP E/M EACH 15 MIN: CPT | Performed by: PSYCHIATRY & NEUROLOGY

## 2024-07-08 PROCEDURE — 95813 EEG EXTND MNTR 61-119 MIN: CPT

## 2024-07-08 PROCEDURE — 250N000013 HC RX MED GY IP 250 OP 250 PS 637: Performed by: INTERNAL MEDICINE

## 2024-07-08 PROCEDURE — 250N000011 HC RX IP 250 OP 636: Performed by: INTERNAL MEDICINE

## 2024-07-08 PROCEDURE — 84300 ASSAY OF URINE SODIUM: CPT | Performed by: INTERNAL MEDICINE

## 2024-07-08 PROCEDURE — A9585 GADOBUTROL INJECTION: HCPCS | Performed by: INTERNAL MEDICINE

## 2024-07-08 PROCEDURE — 83935 ASSAY OF URINE OSMOLALITY: CPT | Performed by: INTERNAL MEDICINE

## 2024-07-08 PROCEDURE — G0378 HOSPITAL OBSERVATION PER HR: HCPCS

## 2024-07-08 PROCEDURE — 92610 EVALUATE SWALLOWING FUNCTION: CPT | Mod: GN

## 2024-07-08 RX ORDER — GADOBUTROL 604.72 MG/ML
8 INJECTION INTRAVENOUS ONCE
Status: COMPLETED | OUTPATIENT
Start: 2024-07-08 | End: 2024-07-08

## 2024-07-08 RX ORDER — ACETAMINOPHEN 325 MG/1
650 TABLET ORAL EVERY 4 HOURS PRN
Status: DISCONTINUED | OUTPATIENT
Start: 2024-07-08 | End: 2024-07-16 | Stop reason: HOSPADM

## 2024-07-08 RX ORDER — LORAZEPAM 2 MG/ML
0.5 INJECTION INTRAMUSCULAR EVERY 6 HOURS PRN
Status: DISCONTINUED | OUTPATIENT
Start: 2024-07-08 | End: 2024-07-16 | Stop reason: HOSPADM

## 2024-07-08 RX ADMIN — LEVETIRACETAM 1000 MG: 500 TABLET, FILM COATED ORAL at 20:25

## 2024-07-08 RX ADMIN — LEVETIRACETAM 1000 MG: 500 TABLET, FILM COATED ORAL at 06:33

## 2024-07-08 RX ADMIN — GADOBUTROL 8 ML: 604.72 INJECTION INTRAVENOUS at 12:32

## 2024-07-08 RX ADMIN — ROSUVASTATIN 10 MG: 10 TABLET, FILM COATED ORAL at 08:51

## 2024-07-08 RX ADMIN — PERFLUTREN 2 ML: 6.52 INJECTION, SUSPENSION INTRAVENOUS at 07:45

## 2024-07-08 RX ADMIN — PANTOPRAZOLE SODIUM 40 MG: 20 TABLET, DELAYED RELEASE ORAL at 05:07

## 2024-07-08 RX ADMIN — ASPIRIN 325 MG ORAL TABLET 325 MG: 325 PILL ORAL at 08:51

## 2024-07-08 RX ADMIN — LORAZEPAM 0.5 MG: 2 INJECTION, SOLUTION INTRAMUSCULAR; INTRAVENOUS at 08:43

## 2024-07-08 ASSESSMENT — ACTIVITIES OF DAILY LIVING (ADL)
ADLS_ACUITY_SCORE: 45
ADLS_ACUITY_SCORE: 35
ADLS_ACUITY_SCORE: 39
ADLS_ACUITY_SCORE: 39
ADLS_ACUITY_SCORE: 35
ADLS_ACUITY_SCORE: 35
DEPENDENT_IADLS:: TRANSPORTATION
ADLS_ACUITY_SCORE: 35
ADLS_ACUITY_SCORE: 35
ADLS_ACUITY_SCORE: 39
ADLS_ACUITY_SCORE: 35
ADLS_ACUITY_SCORE: 35
ADLS_ACUITY_SCORE: 39
ADLS_ACUITY_SCORE: 45
ADLS_ACUITY_SCORE: 35
ADLS_ACUITY_SCORE: 39

## 2024-07-08 NOTE — PLAN OF CARE
PRIMARY DIAGNOSIS: GENERALIZED WEAKNESS    OUTPATIENT/OBSERVATION GOALS TO BE MET BEFORE DISCHARGE  1. Orthostatic performed: Yes:                                    2. Tolerating PO medications: Yes    3. Return to near baseline physical activity: No    4. Cleared for discharge by consultants (if involved): No    Discharge Planner Nurse   Safe discharge environment identified: No  Barriers to discharge: Yes       Entered by: Kerrie Alcaraz RN 07/08/2024 2:38 PM     Please review provider order for any additional goals.   Nurse to notify provider when observation goals have been met and patient is ready for discharge.Goal Outcome Evaluation: pt alert and oriented denied pain, shuffle gait, intermittent word finding, permanent pacemaker with dual chamber, neuro checks q4,urinal at the bedside, no aphasia, wife at the bedside.

## 2024-07-08 NOTE — ED NOTES
MRI tech called and informed that MRI will be done in am because pt has a pacemaker and they need to coordinate it first.

## 2024-07-08 NOTE — PHARMACY-ADMISSION MEDICATION HISTORY
Pharmacist Admission Medication History    Admission medication history is complete. The information provided in this note is only as accurate as the sources available at the time of the update.    Information Source(s): Family member and CareEverywhere/SureScripts via in-person    Pertinent Information: patient's wife provided majority of medication history, due to patient's aphasia. Wife reports patient gets medications in divvyDOSE packs to help with adherence.    Changes made to PTA medication list:  Added: Keppra  Deleted:   Amiodarone 200 mg daily  Eliquis (was taking Xarelto until 6/20/24)  Ibuprofen PRN  Lisinopril 10 mg daily  Changed:   Aspirin 81 mg daily -> 325 mg daily  Omeprazole 20 mg daily -> takes daily PRN heartburn  Rosuvastatin 20 mg daily -> 10 mg daily    Allergies reviewed with patient and updates made in EHR: yes    Medication History Completed By: Nicollette McMann, [facility] 7/7/2024 8:57 PM    PTA Med List   Medication Sig Last Dose    aspirin (ASA) 325 MG tablet Take 1 tablet by mouth daily 7/7/2024 at AM    Calcium Carb-Cholecalciferol (CALCIUM-VITAMIN D3) 600-400 MG-UNIT CAPS Take 2 tablets by mouth daily 7/7/2024 at AM    levETIRAcetam (KEPPRA) 1000 MG tablet Take 1,000 mg by mouth 2 times daily 7/7/2024 at 18:30 (2nd dose of the day)    metoprolol succinate ER (TOPROL-XL) 50 MG 24 hr tablet Take 50 mg by mouth daily 7/7/2024 at AM    omeprazole (PRILOSEC) 20 MG DR capsule Take 20 mg by mouth daily as needed (heartburn)  at PRN    rosuvastatin (CRESTOR) 10 MG tablet Take 10 mg by mouth daily 7/7/2024 at AM

## 2024-07-08 NOTE — PROGRESS NOTES
Neurology note    Called by charge nurse to see if patient would need a urgent video EEG.  Patient is having behavioral episodes where he is pointing to the ceiling similar to previous episodes.  Also has some slurred speech.    Patient already on Keppra with therapeutic level.  Will defer to rounding neurologist in the morning if prolonged EEG is needed in the morning tomorrow.    Marshal Dominguez MD  Neurologist  [facility]  Tel:- [phone]

## 2024-07-08 NOTE — CONSULTS
[facility]    Stroke Telephone Note    I was called by Carine Engle on 07/07/24 regarding patient Andrew Haines. The patient is a 83 year old male with history of paroxysmal atrial fibrillation not on anticoagulation(.  Recently stopped in June 2024),, complete heart block status post dual-chamber pacemaker, history of left frontal MCA infarct hypertension, hyperlipidemia,  epilepsy on Keppra had an episode today when he was in the car with his wife and he became mute and started acting confused and naming streets for about 10 to 15 minutes and then returned to baseline.  Currently no deficits in the ED.  As per ED, he has been having multiple episodes of staring and confusion in the past few months.  His last known well is 6:30 PM today and is currently on aspirin.  Blood pressure 210/84    He had a recent MRI on the 7/2/2024 which showed small subacute ischemic change in the right cerebellar hemisphere along with multiple chronic infarcts in the bilateral cerebellar hemisphere.    Vitals  BP: (!) 210/84   Pulse: 86   Resp: 20   Temp: 97.8  F (36.6  C)   Weight: 83.1 kg (183 lb 1.6 oz)    Stroke Code Data (for stroke code without tele)  Stroke code activated 07/07/24  1909   Stroke provider first response 07/07/24  1910   Last known normal 07/07/24  1830      Time of discovery (or onset of symptoms) 07/07/24  1830   Head CT read by Stroke Neuro Provider 07/07/24  1929   Was stroke code de-escalated? Yes  07/07/24  1937     Imaging Findings  CT head: No hemorrhage  CTA head/neck: No large vessel occlusion.  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    Intravenous Thrombolysis  Not given due to:   - Resolved symptoms    Endovascular Treatment  Not initiated due to absence of proximal vessel occlusion    Impression  Transient ischemic  "attack versus seizure.  However patient recalls the episode so that makes it less likely to be seizure and more likely to be transient ischemic attack.      Recommendations   -Will have to reassess starting Xarelto.  - SBP <180 mm Hg  - Neurochecks and Vital Signs every every 4 hours   - Daily aspirin 81 mg for secondary stroke prevention  - Statin: After lipid profile  - MRI Brain with and without contrast    - TTE (with Bubble Study if age 60 yrs or less)  - 24-hour Telemetry  - Bedside Glucose Monitoring  - A1c, Lipid Panel  - PT/OT/SLP  - Stroke Education  - Euthermia, Euglycemia    My recommendations are based on the information provided over the phone by Andrew Haines's in-person providers. They are not intended to replace the clinical judgment of his in-person providers. I was not requested to personally see or examine the patient at this time.     The Stroke Staff is Dr. Hollis.    Abiodun Peters MD  Vascular Neurology Fellow    To page me or covering stroke neurology team member, click here: [facility]  Choose \"On Call\" tab at top, then select \"NEUROLOGY/ALL SITES\" from middle drop-down box, press Enter, then look for \"stroke\" or \"telestroke\" for your site.   "

## 2024-07-08 NOTE — ED NOTES
TRANSPORT:    PT WENT TO: CT    TRANSPORTED BY: this nurse for Tier 1 stroke code    VIA: wheelchair    MONITORED BY: this nurse     RETURNED FROM: CT via cart     CALL LIGHT GIVEN TO PT: Yes.    PT PLACED ON MONITOR: Yes.     161/77, 70,16, 98% on RA    Report given to Kwadwo KNIGHT

## 2024-07-08 NOTE — CONSULTS
NEUROLOGY CONSULTATION NOTE     Andrew Haines,  1940, MRN [id number] Date: 2024     [facility]   Code status:  Full Code   PCP: Dheeraj Domingo, Debbie      ASSESSMENT & PLAN   Diagnosis code: Suspected TIA    82 yo man with history of epilepsy, prior stroke and afib (not on anticoagulant) presenting with episodes of confusion/finger-tapping. Recent work-up for similar    CT/CTA shows nothing acute  MRI brain  shows nothing acute; multiple chronic infarcts  Keppra level therapeutic at 28.5. Continue PTA dosing  Echocardiogram is noncontributory  Electroencephalogram shows slowing, no seizure activity - suspicion for seizure is low given patient's memory of the episodes  A1c: 5.8%.  LDL 71. Continue lower dose of rosuvastatin  Continue daily ASA (now 325mg)  Physical therapy/occupational therapy  Neurochecks and telemetry  Patient would prefer to talk to his OP Cardiologist about any need for anticoagulation going forward  Neurology will follow along       Chief Complaint   Patient presents with    Confusion    twitching               HISTORY OF PRESENT ILLNESS     We have been requested by Dr. iRos to evaluate Andrew Haines who is a 83 year old male for TIA vs breakthrough seizure. Patient presented to the ED on  with altered mental status. History of previous stroke and PAFib. On day of presentation he had some confusional spells with some rhythmic hand movements. Stroke team initially consulted.     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 Keppra level therapeutic. His chart indicated MCA and impaired balance as well. Recently stopped Xarelto.    Andrew tells me he remembers the recent episodes, describes them as difficulties getting his words out.  His wife is at bedside and says that these events are different than what he was experiencing when he was diagnosed with epilepsy.  He has had multiple titrations of his Keppra but has been on the  current dose for about a year.  He says that his cardiologist was adamant about him not being on the Xarelto any longer.  There is question of documented A-fib on monitoring apparently.  He had an episode about an hour ago and nursing staff called me to let me know these were becoming more frequent this morning.  Again, described as a few seconds of halted speech and some finger movements.  I ask about any problems with hand coordination and he says the only issue he has is a problem with some wrist pain.     PAST MEDICAL & SURGICAL HISTORY     Medical History  No past medical history on file.  Surgical History  No past surgical history on file.     SOCIAL HISTORY     Social History      FAMILY HISTORY     Reviewed, and family history includes Cerebrovascular Disease in his father.     ALLERGIES     Allergies   Allergen Reactions    Penicillins Rash     Denies throat swelling or breathing difficulty  Denies throat swelling or breathing difficulty      Pollen Extract Cough        REVIEW OF SYSTEMS     Pertinent items are noted in HPI.     HOME & HOSPITAL MEDICATIONS     Prior to Admission Medications  (Not in a hospital admission)      Hospital Medications  Current Facility-Administered Medications   Medication Dose Route Frequency Provider Last Rate Last Admin    aspirin (ASA) tablet 325 mg  325 mg Oral Daily Kimberly Rios MD        levETIRAcetam (KEPPRA) tablet 1,000 mg  1,000 mg Oral BID Kimberly Rios MD   1,000 mg at 07/08/24 0633    [Held by provider] metoprolol succinate ER (TOPROL XL) 24 hr tablet 50 mg  50 mg Oral Daily Kimberly Rios MD        rosuvastatin (CRESTOR) tablet 10 mg  10 mg Oral Daily Kimberly Rios MD        sodium chloride (PF) 0.9% PF flush 3 mL  3 mL Intracatheter Q8H Kimberly Rios MD   3 mL at 07/08/24 0503        PHYSICAL EXAM     Vital signs  Temp:  [97.8  F (36.6  C)] 97.8  F (36.6  C)  Pulse:  [62-86] 74  Resp:  [16-23] 22  BP: (131-210)/(65-90) 159/77  FiO2  (%):  [1 %] 1 %  SpO2:  [88 %-99 %] 98 %    Weight:   [unfilled]    General Physical Exam: Patient is alert and oriented x 3. Vital signs were reviewed and are documented in EMR.   Neurological Exam:  Mental status: Attentive, interactive.  Speech: Fluent during our discussion.  Cranial nerves: EOM full, face symmetric.  Motor: Moves all extremities without difficulty.  Coordination: Finger tapping is normal, no dysmetria.  Gait: Deferred for safety.     DIAGNOSTIC STUDIES     Pertinent Radiology   Radiology Results: Personally reviewed image/s    CT/CTA  IMPRESSION:   HEAD CT:  1.  No CT evidence for acute intracranial process.  2.  Brain atrophy and presumed chronic microvascular ischemic changes as above.     HEAD CTA:  1.  No flow-limiting stenosis, aneurysm, or high flow vascular malformation identified.  2.  Variant [place] anatomy as above.     NECK CTA:  1.  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  2.  Atherosclerotic plaque results in less than 50% stenosis of the right ICA origin.    MRI  IMPRESSION:  1.  No evidence for acute/subacute infarct. The previously visualized small focus of restricted diffusion at the inferior right cerebellar hemisphere is not well appreciated on the current study. This may be secondary to technique versus interval   evolution.  2.  Multiple chronic infarcts again noted, most prominent at the left greater than right cerebellar hemispheres.  3.  Moderate generalized brain parenchymal volume loss and chronic microvascular ischemic change.    Electroencephalogram  Impression: This is an abnormal EEG due to left frontal (F3) sharp discharges that suggest a low threshold for focal seizure.  Underlying background is borderline slow suggesting minimal nonspecific cerebral dysfunction     Classification: Dysrhythmia grade III left frontal (F3)                            Dysrhythmia grade I  generalized      Echocardiogram  Interpretation Summary     The left ventricle is normal in size.  Left ventricular function is normal.The ejection fraction is 60-65%.  A sigmoid septum is present.  Diastolic Doppler findings (E/E' ratio and/or other parameters) suggest left  ventricular filling pressures are increased.  Septal wall motion abnormality may reflect pacemaker activation.  There is a pacemaker lead in the right ventricle.  There is mild (1+) mitral regurgitation.  Borderline dilated ascending aorta.    Pertinent Labs   Lab Results: personally reviewed.   Recent Results (from the past 24 hour(s))   Glucose by meter    Collection Time: 07/07/24  7:04 PM   Result Value Ref Range    GLUCOSE BY METER POCT 101 (H) 70 - 99 mg/dL   Basic metabolic panel    Collection Time: 07/07/24  7:25 PM   Result Value Ref Range    Sodium 133 (L) 135 - 145 mmol/L    Potassium 4.7 3.4 - 5.3 mmol/L    Chloride 98 98 - 107 mmol/L    Carbon Dioxide (CO2) 25 22 - 29 mmol/L    Anion Gap 10 7 - 15 mmol/L    Urea Nitrogen 18.9 8.0 - 23.0 mg/dL    Creatinine 1.17 0.67 - 1.17 mg/dL    GFR Estimate 62 >60 mL/min/1.73m2    Calcium 9.1 8.8 - 10.2 mg/dL    Glucose 98 70 - 99 mg/dL   INR    Collection Time: 07/07/24  7:25 PM   Result Value Ref Range    INR 1.02 0.85 - 1.15   Partial thromboplastin time    Collection Time: 07/07/24  7:25 PM   Result Value Ref Range    aPTT 24 22 - 38 Seconds   Troponin T, High Sensitivity    Collection Time: 07/07/24  7:25 PM   Result Value Ref Range    Troponin T, High Sensitivity 16 <=22 ng/L   Keppra (Levetiracetam) Level    Collection Time: 07/07/24  7:25 PM   Result Value Ref Range    Keppra (Levetiracetam) Level 28.5 10.0 - 40.0  g/mL   CBC with platelets and differential    Collection Time: 07/07/24  7:25 PM   Result Value Ref Range    WBC Count 8.4 4.0 - 11.0 10e3/uL    RBC Count 4.49 4.40 - 5.90 10e6/uL    Hemoglobin 14.0 13.3 - 17.7 g/dL    Hematocrit 41.8 40.0 - 53.0 %    MCV 93 78 - 100 fL     MCH 31.2 26.5 - 33.0 pg    MCHC 33.5 31.5 - 36.5 g/dL    RDW 13.2 10.0 - 15.0 %    Platelet Count 203 150 - 450 10e3/uL    % Neutrophils 47 %    % Lymphocytes 32 %    % Monocytes 10 %    % Eosinophils 10 %    % Basophils 1 %    % Immature Granulocytes 0 %    NRBCs per 100 WBC 0 <1 /100    Absolute Neutrophils 3.9 1.6 - 8.3 10e3/uL    Absolute Lymphocytes 2.7 0.8 - 5.3 10e3/uL    Absolute Monocytes 0.8 0.0 - 1.3 10e3/uL    Absolute Eosinophils 0.8 (H) 0.0 - 0.7 10e3/uL    Absolute Basophils 0.1 0.0 - 0.2 10e3/uL    Absolute Immature Granulocytes 0.0 <=0.4 10e3/uL    Absolute NRBCs 0.0 10e3/uL   Hemoglobin A1c    Collection Time: 07/07/24  7:25 PM   Result Value Ref Range    Hemoglobin A1C 5.8 (H) <5.7 %   Lipid panel reflex to direct LDL: Non-fasting    Collection Time: 07/07/24  7:25 PM   Result Value Ref Range    Cholesterol 133 <200 mg/dL    Triglycerides 50 <150 mg/dL    Direct Measure HDL 52 >=40 mg/dL    LDL Cholesterol Calculated 71 <=100 mg/dL    Non HDL Cholesterol 81 <130 mg/dL   ECG 12-LEAD WITH MUSE (LHE)    Collection Time: 07/07/24  7:30 PM   Result Value Ref Range    Systolic Blood Pressure 159 mmHg    Diastolic Blood Pressure 82 mmHg    Ventricular Rate 69 BPM    Atrial Rate 69 BPM    ME Interval 192 ms    QRS Duration 154 ms     ms    QTc 454 ms    P Axis 17 degrees    R AXIS -70 degrees    T Axis 99 degrees    Interpretation ECG       Sinus rhythm  Left axis deviation  Left ventricular hypertrophy with QRS widening and repolarization abnormality  Lateral infarct , age undetermined  Inferior infarct , age undetermined  Abnormal ECG  No previous ECGs available  Confirmed by SEE ED PROVIDER NOTE FOR, ECG INTERPRETATION ([id number]),  VISHNU AUGUST ([id number]) on 7/8/2024 4:35:29 AM     CBC with platelets    Collection Time: 07/08/24  7:32 AM   Result Value Ref Range    WBC Count 8.2 4.0 - 11.0 10e3/uL    RBC Count 4.35 (L) 4.40 - 5.90 10e6/uL    Hemoglobin 13.5 13.3 - 17.7 g/dL    Hematocrit 40.4  40.0 - 53.0 %    MCV 93 78 - 100 fL    MCH 31.0 26.5 - 33.0 pg    MCHC 33.4 31.5 - 36.5 g/dL    RDW 13.0 10.0 - 15.0 %    Platelet Count 195 150 - 450 10e3/uL       Total time spent for face to face visit, reviewing labs/imaging studies, counseling and coordination of care was: 1 Hour 30 Minutes. More than 50% of this time was spent on counseling and coordination of care.    Dragon software used in the dictation of this note.    Sera Chau MD  [facility] - [address], [address]  [place] [address] [phone]

## 2024-07-08 NOTE — H&P
[facility]    History and Physical - Hospitalist Service       Date of Admission:  7/7/2024    Assessment & Plan      Andrew Haines is a 83 year old male admitted on 7/7/2024. He has h/o PAF and stroke presented with confusion and aphasia. Pt will be admitted for TIA vs seizure.    Acute intermittent confusional state and aphasia  TIA, r/o seizure breakthrough- unlikely   -- Subacute right cerebellar stroke on MRI-brain done on 07/02  -- Stroke neurology consult appreciated  --  CTA-head: no stenosis. CTA-neck: Age indeteminate occlusion of left V1, V2, and V segments vertebral artery. < 50% stenosis of right ICA origin.  -- MRI- subacute cerebellar stroke  -- Stroke order set used  -- EEG. Keppra level 28.5  -- Permissive hypertension for now. SBP<180  -- MRI-brain with w/o contast pending  -- IP Neurology consulted  -- PT/OT/SLP    Hyponatremia, mild  -- Repeat BMP in am    PAF  CHB s/p Permanent pacemaker  -- Recently stopped Xarelto by cardiologist as pacemaker interrogation did no reveal afib since 05/1010. Currently on  due to right cerebellar stroke on MRI-brain done on 07/02.  -- Will hold metoprolol for now.    H/o stroke left frontal MCA   HLD  -- On ASA, Crestor    Seizure disorder  -- C/w PTA Keppra    Mild cognitive impairment  -- following with neurology as OP              Diet: NPO for Medical/Clinical Reasons Except for: No Exceptions  Combination Diet Low Saturated Fat Na <2400mg Diet    DVT Prophylaxis: Pneumatic Compression Devices  Gaviria Catheter: Not present  Lines: None     Cardiac Monitoring: ACTIVE order. Indication: Stroke, acute (48 hours)  Code Status: Full Code    Clinically Significant Risk Factors Present on Admission                # Drug Induced Platelet Defect: home medication list includes an antiplatelet medication   # Hypertension: Noted on problem list                        Disposition Plan     Medically Ready for Discharge: Anticipated in 2-4  Days           Kimberly Rios MD  Hospitalist Service  [facility]  Securely message with [employer] (more info)  Text page via "[employer]" Paging/Directory     ______________________________________________________________________    Chief Complaint   Confusion    History is obtained from the patient and his spouse.    History of Present Illness   Andrew Haines is a 83 year old male who  has h/o PAF and stroke presented with altered mental status. Spouse was driving home and pt was sitting on the passenger side. He suddenly stopped speaking and started rhythmically tapping with right index finger on the car under the window. He then said '[place]' and '61' which he later stated that he was trying to describe the avenue names. The episode lasted 15 minutes. He asked his wife to go to ER. He required assistance to get out of vehicle when he arrived in the ED. He later had a second episode in ED which was witnessed by ED physician he was holding his finger and tapping rhythmically and it lasted 5 minutes. No post ictal confusion. Had memory of the event. He had similar episodes with no memory of event. CT-head was negative for acute changes. CTA-head: no stenosis. CTA-neck: Age indeteminate occlusion of left V1, V2, and V segments vertebral artery. < 50% stenosis of right ICA origin. Tele neurology consulted in ED. Pt will be admitted for TIA vs seizure.      Past Medical History    No past medical history on file.    Past Surgical History   No past surgical history on file.    Prior to Admission Medications   Prior to Admission Medications   Prescriptions Last Dose Informant Patient Reported? Taking?   Calcium Carb-Cholecalciferol (CALCIUM-VITAMIN D3) 600-400 MG-UNIT CAPS 7/7/2024 at AM Spouse/Significant Other Yes Yes   Sig: Take 2 tablets by mouth daily   aspirin (ASA) 325 MG tablet 7/7/2024 at AM Spouse/Significant Other Yes Yes   Sig: Take 1 tablet by mouth daily   levETIRAcetam (KEPPRA) 1000  MG tablet 7/7/2024 at 18:30 (2nd dose of the day) Spouse/Significant Other Yes Yes   Sig: Take 1,000 mg by mouth 2 times daily   metoprolol succinate ER (TOPROL-XL) 50 MG 24 hr tablet 7/7/2024 at AM Spouse/Significant Other Yes Yes   Sig: Take 50 mg by mouth daily   omeprazole (PRILOSEC) 20 MG DR capsule  at PRN  Yes Yes   Sig: Take 20 mg by mouth daily as needed (heartburn)   rosuvastatin (CRESTOR) 10 MG tablet 7/7/2024 at AM Spouse/Significant Other Yes Yes   Sig: Take 10 mg by mouth daily      Facility-Administered Medications: None        Review of Systems    The 10 point Review of Systems is negative other than noted in the HPI or here.      Physical Exam   Vital Signs: Temp: 97.8  F (36.6  C) Temp src: Temporal BP: (!) 147/77 Pulse: 70   Resp: 23 SpO2: 94 % O2 Device: None (Room air) Oxygen Delivery: 2 LPM  Weight: 183 lbs 1.6 oz    GEN: Alert and oriented. Not in acute distress.  HEENT: Atraumatic, mucous membrane- moist and pink.  Chest: Bilateral air entry.  CVS: S1S2 regular.   Abdomen: Soft. Non-tender, non-distended. No organomegaly. No guarding or rigidity. Bowel sounds active.   Extremities: No pedal edema.  CNS: FNT left: positive  Skin: no cyanosis or clubbing.     Medical Decision Making       76 MINUTES SPENT BY ME on the date of service doing chart review, history, exam, documentation & further activities per the note.      Data

## 2024-07-08 NOTE — CONSULTS
Care Management Initial Consult    General Information  Assessment completed with: Patient,    Type of CM/SW Visit: Initial Assessment    Primary Care Provider verified and updated as needed: Yes   Readmission within the last 30 days: no previous admission in last 30 days      Reason for Consult: discharge planning  Advance Care Planning: Advance Care Planning Reviewed: no concerns identified          Communication Assessment  Patient's communication style: spoken language (English or Bilingual)             Cognitive  Cognitive/Neuro/Behavioral: WDL  Level of Consciousness: (P) alert  Arousal Level: (P) opens eyes spontaneously  Orientation: (P) oriented x 4  Mood/Behavior: calm  Best Language: (P) 0 - No aphasia  Speech: (P) clear    Living Environment:   People in home: spouse  [place]  Current living Arrangements: house      Able to return to prior arrangements: yes       Family/Social Support:  Care provided by: self  Provides care for: no one  Marital Status:   Wife  [place]       Description of Support System: Supportive, Involved         Current Resources:   Patient receiving home care services: No     Community Resources: None  Equipment currently used at home: none  Supplies currently used at home: None    Employment/Financial:  Employment Status: retired        Financial Concerns: none   Referral to Financial Worker: No       Does the patient's insurance plan have a 3 day qualifying hospital stay waiver?  No    Lifestyle & Psychosocial Needs:  Social Determinants of Health     Food Insecurity: Not on file   Depression: Not at risk (5/18/2023)    Received from [employer]    PHQ-2     PHQ-2 Score: 0   Housing Stability: Not on file   Tobacco Use: Medium Risk (6/19/2024)    Received from [employer]    Patient History     Smoking Tobacco Use: Former     Smokeless Tobacco Use: Never     Passive Exposure: Never   Financial Resource Strain: Not on file   Alcohol Use: Not on file   Transportation Needs:  Not on file   Physical Activity: Not on file   Interpersonal Safety: Not on file   Stress: Not on file   Social Connections: Not on file   Health Literacy: Not on file       Functional Status:  Prior to admission patient needed assistance:   Dependent ADLs:: Independent  Dependent IADLs:: Transportation       Mental Health Status:          Chemical Dependency Status:                Values/Beliefs:  Spiritual, Cultural Beliefs, Hoahaoism Practices, Values that affect care:                 Additional Information:  Assessed. Pt lives in a house with his wife [place]. He is largely independent with ADLs and IADLs. No longer driving, [place] drives. Has a . No other equipment or services. Goal is to discharge home, [place] to transport home.    RNCM to follow for medical progression, recommendations, and final discharge plan.      Keyonna Calvert RN

## 2024-07-08 NOTE — PROGRESS NOTES
Speech-Language Pathology: Clinical Swallow Evaluation     07/08/24 1000   Appointment Info   Signing Clinician's Name / Credentials (SLP) Lupe Anderson MA CCC-SLP   General Information   Onset of Illness/Injury or Date of Surgery 07/07/24   Referring Physician Dr. Hill   Pertinent History of Current Problem Per EMR: 83 year old male admitted on 7/7/2024. He has h/o PAF and stroke presented with confusion and aphasia. Pt will be admitted for TIA vs seizure.     Acute intermittent confusional state and aphasia  TIA, r/o seizure breakthrough- unlikely   -- Subacute right cerebellar stroke on MRI-brain done on 07/02  -- Stroke neurology consult appreciated  --  CTA-head: no stenosis. CTA-neck: Age indeteminate occlusion of left V1, V2, and V segments vertebral artery. < 50% stenosis of right ICA origin.  -- MRI- subacute cerebellar stroke  -- Stroke order set used  -- EEG. Keppra level 28.5  -- Permissive hypertension for now. SBP<180  -- MRI-brain with w/o contast pending  -- IP Neurology consulted  -- PT/OT/SLP   General Observations Alert and cooperative   Type of Evaluation   Type of Evaluation Swallow Evaluation   Oral Motor   Oral Musculature generally intact   Mucosal Quality good   Oral Motor Deficits Observed Facial Symmetry (Oral Motor) (Group)  (slight left asymmetry but WFL and likely baseline)   Dentition (Oral Motor)   Dentition (Oral Motor) adequate dentition   Lip Function (Oral Motor)   Lip Range of Motion (Oral Motor) WNL   Lip Strength (Oral Motor) WNL   Tongue Function (Oral Motor)   Tongue Strength (Oral Motor) WNL   Tongue Coordination/Speed (Oral Motor) WNL   Tongue ROM (Oral Motor) WNL   Facial Sensation   Facial Sensation   (patient denies any changes)   Vocal Quality/Secretion Management (Oral Motor)   Vocal Quality (Oral Motor) WNL   Secretion Management (Oral Motor) WNL   General Swallowing Observations   Past History of Dysphagia None per EMR, RN, or patient report. CVA numerous years ago  with no reports of dysphagia.   Current Diet/Method of Nutritional Intake (General Swallowing Observations, NIS) regular diet;thin liquids (level 0)   Swallowing Evaluation Clinical swallow evaluation   Clinical Swallow Evaluation   Clinical Swallow Evaluation Textures Trialed thin liquids;solid foods   Clinical Swallow Eval: Thin Liquid Texture Trial   Mode of Presentation, Thin Liquids cup;straw   Oral Phase of Swallow WFL   Pharyngeal Phase of Swallow intact   Diagnostic Statement No overt s/s aspiration   Clinical Swallow Evaluation: Solid Food Texture Trial   Mode of Presentation self-fed   Oral Phase WFL   Pharyngeal Phase intact   Diagnostic Statement No overt s/s aspiration   Esophageal Phase of Swallow   Patient reports or presents with symptoms of esophageal dysphagia No   Swallowing Recommendations   Diet Consistency Recommendations regular diet;thin liquids (level 0)   Recommended Feeding/Eating Techniques (Swallow Eval) maintain upright sitting position for eating   Medication Administration Recommendations, Swallowing (SLP) Per patient preference   Instrumental Assessment Recommendations instrumental evaluation not recommended at this time   Comment, Swallowing Recommendations No s/s aspiration or dysphagia. Low concern for aspiration.   Clinical Impression   Criteria for Skilled Therapeutic Interventions Met (SLP Eval) Evaluation only   SLP Diagnosis Dysphagia   Risks & Benefits of therapy have been explained evaluation/treatment results reviewed;care plan/treatment goals reviewed;participants voiced agreement with care plan;participants included;patient   Clinical Impression Comments Patient participated in Clinical Swallow Evaluation. No s/s aspiration with any intake. Oral motor was WFL. Mastication was adequate. Hyolaryngeal movement was present. Admitted with periods of inability to speak, currently resolved. Patient reports being able to think of what he wants to say but not physically able to  speak. Denies word finding deficits in these episodes and episodic only.  No formal ST evaluation is warranted at this time and no anticipated SLP needs going forward. If symptoms return and persist, consider ST referral. Please contact SLP with any questions or concerns.   SLP Total Evaluation Time   Eval: oral/pharyngeal swallow function, clinical swallow Minutes ([id number]) 20   SLP Discharge Planning   SLP Rationale for DC Rec No ST needs post-d/c   SLP Brief overview of current status  Recommend regular textures and thin liquids. No anticipated SLP needs at this time. Please contact SLP with any questions or concerns. If communication deficits return and persist, consider ST evaluation.   Total Session Time   Total Session Time (sum of timed and untimed services) 20

## 2024-07-08 NOTE — PROGRESS NOTES
Bedside EEG was performed. Wake, drowsy, and sleep were obtained.   Activations completed were: (eyes open/eyes closed, mental activations photic)   Activations omitted & reasoning:Hyperventilations due to patient sate  Patient's mental status was: (alert/oriented, cooperative)  Was the neurologist consulted regarding significant waveforms or interventions needed? yes  Skin intact? yes     EEG #   EEG system used: [id number]    Neurologist dictation to follow.

## 2024-07-08 NOTE — PROGRESS NOTES
Patient was getting ready to had EEG. While EEG tech was placing leads patient had another episode where he just starred off and couldn't speak. Wife in room and also witnessed and said this is what has been happening. Lasted for about 3 minutes and then patient was back to normal AxOX3. Patient knows when it happens and can't talk.

## 2024-07-08 NOTE — ED TRIAGE NOTES
Pt reports hx of seizures, hx of strokes.  Pt reports sudden onset of twitching to his R hand and confusion at 1830.  Pt usually has absent seizures.  Provider called for triage.  .     Triage Assessment (Adult)       Row Name 07/07/24 1904          Triage Assessment    Airway WDL WDL        Respiratory WDL    Respiratory WDL WDL        Skin Circulation/Temperature WDL    Skin Circulation/Temperature WDL WDL        Cardiac WDL    Cardiac WDL WDL        Peripheral/Neurovascular WDL    Peripheral Neurovascular WDL WDL        Cognitive/Neuro/Behavioral WDL    Cognitive/Neuro/Behavioral WDL WDL

## 2024-07-08 NOTE — UTILIZATION REVIEW
" Admission Status; Secondary Review Determination     Admission Date: 7/7/2024  7:13 PM       Under the authority of the Utilization Management Committee, the utilization review process indicated a secondary review on the above patient.  The review outcome is based on review of the medical records, discussions with staff, and applying clinical experience noted on the date of the review.          (x) Observation Status Appropriate - This patient does not meet hospital inpatient criteria and is placed in observation status. If this patient's primary payer is Medicare and was admitted as an inpatient, Condition Code 44 should be used and patient status changed to \"observation\".       RATIONALE FOR DETERMINATION      Brief clinical presentation, information copied from the chart, abbreviated and edited for relevant content:     Messaged team to change to OBS.      An 83-year-old male with a history of paroxysmal atrial fibrillation and stroke was admitted on 7/7/2024, presenting with confusion and aphasia - evaluated for a transient ischemic attack (TIA) versus seizure. 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 EEG nondiagnostic. Labs showed a Keppra level of 28.5. Permissive hypertension is being maintained with SBP <180. Neurology, PT, OT, and SLP consultations have been initiated. An MRI of the brain with and without contrast is pending due to pacemaker. Clinically stable.       This patient is expected to need a short hospitalization because the primary cause of his current symptoms, a subacute cerebellar stroke, has already been identified, and he is under appropriate specialist care. The absence of significant stenosis on CTA and controlled permissive hypertension suggest that his " condition can be managed effectively with short-term inpatient care. The pending MRI and continuous monitoring will further guide treatment, but there are no indications of severe complications that would necessitate prolonged hospitalization. Post-acute care and rehabilitation can be managed on an outpatient basis.        The severity of illness, intensity of cares provided, risk for adverse outcome, and expected LOS make the care appropriate for observation.       The information on this document is developed by the utilization review team in order for the business office to ensure compliance.  This only denotes the appropriateness of proper admission status and does not reflect the quality of care rendered.         The definitions of Inpatient Status and Observation Status used in making the determination above are those provided in the CMS Coverage Manual, Chapter 1 and Chapter 6, section 70.4.      Sincerely,     Heide Carreno MD   Utilization Review/ Case Management  [facility].

## 2024-07-08 NOTE — PLAN OF CARE
PRIMARY DIAGNOSIS: GENERALIZED WEAKNESS    OUTPATIENT/OBSERVATION GOALS TO BE MET BEFORE DISCHARGE  1. Orthostatic performed: Yes:                                    2. Tolerating PO medications: Yes    3. Return to near baseline physical activity: No    4. Cleared for discharge by consultants (if involved): No    Discharge Planner Nurse   Safe discharge environment identified: No  Barriers to discharge: Yes       Entered by: Kerrie Alcaraz RN 07/08/2024 5:42 PM     Please review provider order for any additional goals.   Nurse to notify provider when observation goals have been met and patient is ready for discharge.Goal Outcome Evaluation:  Pt continued to have intermittent word finding difficulty, ate 75% of lunch without swallow difficult, ambulate to the bathroom with assist of two, wife at the bedside, pt can intermittently make his needs known.

## 2024-07-08 NOTE — PROGRESS NOTES
Patient going to MRI with SWAT nurse monitored via cart. Report called to Antione KNIGHT. Patient will be going up to room 110 after MRI. Patient has all belongings. Including cell phone, and clothing. Wife here with patient.

## 2024-07-08 NOTE — ED NOTES
Pt had an episode of index finger tapping and was not able to speak, but follow commands; ER MD aware and witnessed this episode herself.

## 2024-07-08 NOTE — PROVIDER NOTIFICATION
Spoke with Dr Dominguez per Dr Tom request for pts increased confusion, word finding difficulties and circling the air with his finger. No cont EEG is recommended tonight per Dr Dominguez and Dr Dominguez will update Dr Chau and she can decide in the am if any new testing is needed. Dr Hill updated.

## 2024-07-08 NOTE — PROGRESS NOTES
[facility]    Medicine Progress Note - Hospitalist Service    Date of Admission:  7/7/2024    Assessment & Plan   83-year-old with known history of seizure disorder, prior stroke A-fib not on anticoagulation who presented with episodes of confusion and finger tapping.  MRI brain is negative for acute stroke.  Keppra level is therapeutic.  Echocardiogram is normal contributory.  Although EEG shows slowing and no seizure activity patient continues to have frequent episodes of confusion and arm activity.  Defer continuous EEG monitoring to neurology.  This does not seem to be due to postural changes since it happens while patient is lying in the bed.  Vital signs are unremarkable.  Ordered TSH  Continue to monitor.  Currently on daily aspirin.    Hyponatremia mild  --Improved since yesterday  --Order urine sodium and urine osmolality    Previous stroke  --Continue aspirin/12    Seizure disorder  --Currently on Keppra            Diet: Combination Diet Low Saturated Fat Na <2400mg Diet    DVT Prophylaxis: Low Risk/Ambulatory with no VTE prophylaxis indicated  Gaviria Catheter: Not present  Lines: None     Cardiac Monitoring: None  Code Status: Full Code      Clinically Significant Risk Factors Present on Admission                # Drug Induced Platelet Defect: home medication list includes an antiplatelet medication   # Hypertension: Noted on problem list                 # Financial/Environmental Concerns: none         Disposition Plan     Medically Ready for Discharge: Anticipated in 2-4 Days             Favio Hill MD  Hospitalist Service  [facility]  Securely message with XTW[employer] (more info)  Text page via [employer] Paging/Directory   ______________________________________________________________________    Interval History   Patient new to me.  Chart reviewed.  Wife and son at bedside.  Patient is confused and cannot give reliable information.  He has word finding  difficulties and slurring of speech which last anywhere from 10 minutes to half an hour.  This occurs periodically every half an hour to hour.  MRI brain reviewed and is negative for acute stroke  Denies any hallucinations    Physical Exam   Vital Signs: Temp: 97.4  F (36.3  C) Temp src: Oral BP: (!) 140/69 Pulse: 73   Resp: 20 SpO2: 97 % O2 Device: None (Room air) Oxygen Delivery: 2 LPM  Weight: 183 lbs 1.6 oz    Patient is confused and cannot follow directions such as doing finger-nose test for coordination  Slurring of speech  Tries to move his right arm in the air pointing aimlessly  Able to move all 4 extremities  Sensory exam was not done  Was able to name his son but took more than 10 seconds to name his wife  Cannot recall what he ate for lunch      Medical Decision Making       35 MINUTES SPENT BY ME on the date of service doing chart review, history, exam, documentation & further activities per the note.  MANAGEMENT DISCUSSED with the following over the past 24 hours: Nursing and patient wife and son   NOTE(S)/MEDICAL RECORDS REVIEWED over the past 24 hours: Neurology       Data     I have personally reviewed the following data over the past 24 hrs:    8.2  \   13.5   / 195     134 (L) 99 16.1 /  101 (H)   4.4 27 1.17 \     Trop: 16 BNP: N/A     TSH: N/A T4: N/A A1C: 5.8 (H)     INR:  1.02 PTT:  24   D-dimer:  N/A Fibrinogen:  N/A       Imaging results reviewed over the past 24 hrs:   Recent Results (from the past 24 hour(s))   CTA Head Neck with Contrast    Narrative    EXAM: CTA HEAD NECK W CONTRAST  LOCATION: [facility]  DATE: 7/7/2024    INDICATION: Code Stroke to evaluate for potential thrombolysis and thrombectomy. PLEASE READ IMMEDIATELY. Confusion, inability to walk, right hand twitching.  COMPARISON: MRI brain 7/2/2024  CONTRAST: 67ml [id number]  TECHNIQUE: Head and neck CT angiogram with IV contrast. Noncontrast head CT followed by axial helical CT images of the head and  neck vessels obtained during the arterial phase of intravenous contrast administration. Axial 2D reconstructed images and   multiplanar 3D MIP reconstructed images of the head and neck vessels were performed by the technologist. Dose reduction techniques were used. All stenosis measurements made according to NASCET criteria unless otherwise specified.    FINDINGS:   NONCONTRAST HEAD CT:   INTRACRANIAL CONTENTS: No intracranial hemorrhage, extraaxial collection, or mass effect.  No CT evidence of acute infarct. The known subacute infarct at the right cerebellar hemisphere is not well appreciated. Moderate presumed chronic small vessel   ischemic changes. Multiple chronic infarcts are again noted, most prominent at the bilateral cerebellar hemispheres.2 Moderate generalized volume loss. No hydrocephalus.     VISUALIZED ORBITS/SINUSES/MASTOIDS: Prior bilateral cataract surgery. Visualized portions of the orbits are otherwise unremarkable. No paranasal sinus mucosal disease. No middle ear or mastoid effusion.    BONES/SOFT TISSUES: No acute abnormality.    HEAD CTA:  ANTERIOR CIRCULATION: No stenosis/occlusion, aneurysm, or high flow vascular malformation. There are atherosclerotic calcifications of bilateral carotid siphons without hemodynamically significant stenosis. Standard [place] of [place] anatomy.    POSTERIOR CIRCULATION: No occlusion, aneurysm, or high flow vascular malformation. No high-grade stenosis. There is focal mild stenosis of the P2 segment right posterior cerebral artery. Dominant right and smaller left vertebral artery contribute to a   normal basilar artery.     DURAL VENOUS SINUSES: Not well evaluated on a technical basis.    NECK CTA:  RIGHT CAROTID: Atherosclerotic plaque results in less than 50% stenosis in the right ICA. No dissection.    LEFT CAROTID: No measurable stenosis or dissection.    VERTEBRAL ARTERIES: There is age-indeterminate occlusion of the left V1, V2, and V3 segments vertebral  artery. There is distal reconstitution at the left V4 segment likely via retrograde flow. The right vertebral artery is patent.     AORTIC ARCH: Classic aortic arch anatomy with no significant stenosis at the origin of the great vessels.    NONVASCULAR STRUCTURES: Unremarkable.      Impression    IMPRESSION:   HEAD CT:  1.  No CT evidence for acute intracranial process.  2.  Brain atrophy and presumed chronic microvascular ischemic changes as above.    HEAD CTA:  1.  No flow-limiting stenosis, aneurysm, or high flow vascular malformation identified.  2.  Variant [place] of [place] anatomy as above.    NECK CTA:  1.  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  2.  Atherosclerotic plaque results in less than 50% stenosis of the right ICA origin.    3.  Dr. Hamilton discussed the above findings by telephone with Dr. Engle at 7:40 PM central time 2024.     Echocardiogram Complete - For age > 60 yrs   Result Value    LVEF  60-65%    Narrative    [id number]  [id number]  [id number]  [id number]^SUZI^[place] [address]     Name: AVELINO HOWE  MRN: [id number]  : 1940  Study Date: 2024 07:45 AM  Age: 83 yrs  Gender: Male  Patient Location: [facility]  Reason For Study: Cerebrovascular Incident  Ordering Physician: STEPHANIE ARCHER  Performed By: JASMIN     BSA: 1.9 m2  Height: 66 in  Weight: 183 lb  HR: 73  ______________________________________________________________________________  Procedure  Complete Portable Echo Adult. Definity (NDC #68903-591) given intravenously.  ______________________________________________________________________________  Interpretation Summary     The left ventricle is normal in size.  Left ventricular function is normal.The ejection fraction is 60-65%.  A sigmoid septum is present.  Diastolic Doppler findings (E/E' ratio and/or other parameters) suggest left  ventricular filling pressures are  increased.  Septal wall motion abnormality may reflect pacemaker activation.  There is a pacemaker lead in the right ventricle.  There is mild (1+) mitral regurgitation.  Borderline dilated ascending aorta.  ______________________________________________________________________________  Left Ventricle  The left ventricle is normal in size. Left ventricular function is normal.The  ejection fraction is 60-65%. A sigmoid septum is present. There is mild  concentric left ventricular hypertrophy. Left ventricular diastolic function  is abnormal. Diastolic Doppler findings (E/E' ratio and/or other parameters)  suggest left ventricular filling pressures are increased. Septal wall motion  abnormality may reflect pacemaker activation.     Right Ventricle  Normal right ventricle size and systolic function. There is a pacemaker lead  in the right ventricle.     Atria  Normal left atrial size. Right atrial size is normal. There is no color  Doppler evidence of an atrial shunt.     Mitral Valve  Mitral valve leaflets appear normal. There is mild (1+) mitral regurgitation.     Tricuspid Valve  The tricuspid valve is not well visualized. Right ventricle systolic pressure  estimate normal. This degree of valvular regurgitation is within normal  limits.     Aortic Valve  Aortic valve leaflets appear normal. There is no evidence of aortic stenosis  or clinically significant aortic regurgitation.     Pulmonic Valve  The pulmonic valve is not well visualized. This degree of valvular  regurgitation is within normal limits.     Vessels  Borderline dilated ascending aorta. IVC diameter <2.1 cm collapsing >50% with  sniff suggests a normal RA pressure of 3 mmHg.     Pericardium  There is no pericardial effusion.     ______________________________________________________________________________  MMode/2D Measurements & Calculations  IVSd: 1.5 cm  LVIDd: 3.9 cm  LVIDs: 2.4 cm  LVPWd: 1.3 cm     FS: 37.5 %  LV mass(C)d: 196.3 grams  LV  mass(C)dI: 101.9 grams/m2  Ao root diam: 3.7 cm  asc Aorta Diam: 3.9 cm  LVOT diam: 2.0 cm  LVOT area: 3.1 cm2  Ao root diam index Ht(cm/m): 2.2  Ao root diam index BSA (cm/m2): 1.9  Asc Ao diam index BSA (cm/m2): 2.0  Asc Ao diam index Ht(cm/m): 2.3  EF Biplane: 65.3 %  RWT: 0.64  TAPSE: 1.8 cm     Doppler Measurements & Calculations  MV E max fawad: 79.7 cm/sec  MV A max fawad: 117.0 cm/sec  MV E/A: 0.68  MV dec slope: 186.0 cm/sec2  MV dec time: 0.43 sec     Ao V2 max: 142.0 cm/sec  Ao max P.0 mmHg  Ao V2 mean: 103.0 cm/sec  Ao mean P.0 mmHg  Ao V2 VTI: 32.1 cm  ROCK(I,D): 2.8 cm2  ROCK(V,D): 2.9 cm2  LV V1 max P.7 mmHg  LV V1 max: 129.0 cm/sec  LV V1 VTI: 28.9 cm  SV(LVOT): 90.8 ml  SI(LVOT): 47.1 ml/m2  PA V2 max: 111.0 cm/sec  PA max P.9 mmHg  PA acc time: 0.08 sec  TR max fawad: 258.0 cm/sec  TR max P.6 mmHg  AV Fawad Ratio (DI): 0.91  ROCK Index (cm2/m2): 1.5  E/E' av.9  Lateral E/e': 17.4  Medial E/e': 18.3  RV S Fawad: 12.1 cm/sec     ______________________________________________________________________________  Report approved by: Jose Cruz Ochoa 2024 09:51 AM         MR Brain w/o & w Contrast    Narrative    EXAM: MR BRAIN W/O and W CONTRAST  LOCATION: [facility]  DATE: 2024    INDICATION: TIA  COMPARISON: CT head 2024 and MRI brain 2024  CONTRAST: 8 ml gadavist  TECHNIQUE: Routine multiplanar multisequence head MRI without and with intravenous contrast.    FINDINGS:  INTRACRANIAL CONTENTS: No acute or subacute infarct. The previously visualized small focus of restricted diffusion at the inferior right cerebellar hemisphere from 2024 is not well appreciated on the current study. This may be secondary to technique   versus interval evolution. No mass, acute hemorrhage, or extra-axial fluid collections. Patchy and confluent nonspecific T2/FLAIR hyperintensities within the cerebral white matter most consistent with moderate chronic  microvascular ischemic change.   Multiple chronic infarcts are again noted, most prominent at the left greater than right cerebellar hemispheres. Moderate generalized cerebral atrophy. No hydrocephalus. Normal position of the cerebellar tonsils. No pathologic contrast enhancement.    SELLA: Empty sella morphology with flattening of the pituitary gland along the sellar floor.    OSSEOUS STRUCTURES/SOFT TISSUES: Normal marrow signal. The major intracranial vascular flow voids are maintained.     ORBITS: Prior bilateral cataract surgery. Visualized portions of the orbits are otherwise unremarkable.     SINUSES/MASTOIDS: No paranasal sinus mucosal disease. No middle ear or mastoid effusion.       Impression    IMPRESSION:  1.  No evidence for acute/subacute infarct. The previously visualized small focus of restricted diffusion at the inferior right cerebellar hemisphere is not well appreciated on the current study. This may be secondary to technique versus interval   evolution.  2.  Multiple chronic infarcts again noted, most prominent at the left greater than right cerebellar hemispheres.  3.  Moderate generalized brain parenchymal volume loss and chronic microvascular ischemic change.

## 2024-07-08 NOTE — PROGRESS NOTES
Request for MRI clearance faxed to [facility] Medical records dept.  Bonnie Bae RN on 7/8/2024 at 8:59 AM       Is the implanted device safe for MRI Exam? Yes  Is this device 3T compatible? Yes  Device Type: Pacemaker      Device Information: Medtronic, PPM [place] (D)      Cardiology Orders for Device Programming     -- Yes -- The patient has a MRI conditional pulse generator and leads from the same     -- Yes -- The pulse generator and leads have been implanted for at least 6 weeks. (Does not apply to [employer] devices)    -- Yes-- The device is implanted in the right or left pectoral region    -- Yes -- There are not any additional active cardiac devices, abandoned leads, lead extenders or adapters    -- Yes -- The device lead impedance measurements are within the normal range per  guidelines    -- Yes -- If the patient is pacemaker dependent the thresholds are less than or equal to 2.0V @ 0.4ms.    -- Yes -- RA and RV leads are programmed to bipolar pacing operation or pacing off. If no, CONTACT THE DEVICE REP FOR PROGRAMMING           Date of last In-clinic Device check: 6/18/2024  Results of last Device check:  1.   Right atrium impedance: 360  2.   Right ventricle impedance: 415  3.   Left ventricle impedance: n/a     4.   Right atrium threshold: 0.625V@0.4ms  5.   Right ventricle threshold: 0.625V@0.4ms  6.   Left ventricle threshold: n/a     Device programming during the scan guidelines   Pacing Mode (check one): Use the recommended pacing mode per Medtronic MRI Access Application  Pacing Rate: 80  bpm or pr Medtronic Alex.  If remote reprogramming is applicable, please contact the Rep to assist         Bonnie Bae RN

## 2024-07-08 NOTE — ED PROVIDER NOTES
EMERGENCY DEPARTMENT ENCOUNTER      NAME: Andrew Haines  AGE: 83 year old male  YOB: 1940  MRN: [id number]  EVALUATION DATE & TIME: No admission date for patient encounter.    PCP: Dheeraj Domingo    ED PROVIDER: Carine Engle MD    Chief Complaint   Patient presents with    Confusion    twitching                FINAL IMPRESSION:  1. Spell of behavior change          ED COURSE & MEDICAL DECISION MAKING:    Pertinent Labs & Imaging studies reviewed. (See chart for details)  83 year old male with history of third-degree AV block status post PPM, paroxysmal A-fib A-fib free per pacemaker interrogation for several years now off of Eliquis on aspirin only, HTN, seizures on Keppra and CVA who presents to the Emergency Department for evaluation of episode of confusion where he was rhythmically tapping his right pointer finger onto the glass of the window with speech changes, saying words that did not make sense, namely the streets of local roads.  Symptoms have now resolved and patient is neuro intact on exam, though hypertensive.  Differential includes seizure, TIA, hypertensive urgency.  Per chart review patient has been seen by neurology recently.  He has been having increasing episodes of brief periods of confusion.  Typically will not recall these and will be confused after these seemingly staring spells and they have been concerned that this is seizure activity.  Patient does have some recollection of the event today however which is atypical compared to previous events.    Patient initially seen evaluated myself in triage area.  Tier 1 stroke alert activated.  Patient expedited for neuroimaging.  Case discussed with stroke neurology.  Twelve-lead EKG shows atrially sensed ventricularly paced rhythm.  CBC, BMP, troponin, coags unremarkable.  Keppra level sent.  CT/CTA of the head and neck unremarkable.  Repeat blood pressure normalized into the 150s.    While updating patient and wife about the  "above, he had another episode witnessed by myself approximately 5 minutes.  See below for details of this episode, but in short was aphasic but would follow commands.  Wife has a video of this episode on her phone.  Based on my witness of this episode and him being able to follow commands during it and have recollection without postictal phase thereafter it seems much more consistent with a TIA than a seizure.  Stroke neurology recommends admission for further TIA workup.      ED Course as of 07/07/24 2033   Sun Jul 07, 2024 1905 I met with the patient, obtained history, performed an initial exam, and discussed options and plan for diagnostics and treatment here in the ED.   1907 I called a stroke 1 tier code.   1910 Spoke w stroke neuro   1940 Spoke w neuro rad - ct head old infarct, nothing acute. CTA - no LVO.    1942 Spoke w stroke neuro - no tpa. Concern for TIA. Recommend admit for TIA workup. Continue ASA 81. SBP <180.   1955 Went in to give patient and wife update.  Patient was seemingly have another episode during me explaining his CT results and reason for admission.  Approximately 5 minutes patient was aphasic.  If you put a hand or object in front of his right upper extremity he would take his right index finger and rhythmically tap at.  However when the hand or object was removed patient would rest the right upper extremity at the bedside.  Patient would follow commands to move his eyes, lift legs up off the bed during this episode.  When episode resolved, patient did not seem to have any obvious postictal phase, and stated \"you wanted me to give you a thumbs up, will here you go\" and gives me a thumbs up that I asked him to do earlier and he was not able to perform.  Wife captured this episode on her phone as a video.   2007 I spoke with hospitalist, Dr. Rios.       Medical Decision Making    History:  Supplemental history from: Documented in chart and Family Member/Significant Other  External " Record(s) reviewed: Documented in chart, Outpatient Record: 2024, and Prior Imagin2024    Work Up:  Chart documentation includes differential considered and any EKGs or imaging independently interpreted by provider, see MDM  In additional to work up documented, I considered the following work up: see MDM    External consultation:  Discussion of management with another provider: stroke neuro, neuro radiology, hospitalist    Complicating factors:  Care impacted by chronic illness: Cerebrovascular Disease, Heart Disease, and Hypertension  Care affected by social determinants of health: Access to Medical Care holiday weekend no access to PCP    Disposition considerations: Admit.        At the conclusion of the encounter I discussed the results of all of the tests and the disposition. The questions were answered. The patient or family acknowledged understanding and was agreeable with the care plan.    CRITICAL CARE:  Critical Care  Performed by: Carine Engle MD  Authorized by: Carine Engle MD     Total critical care time: 35 minutes  Criticalcare time was exclusive of separately billable procedures and treating other patients.  Critical care was necessary to treat or prevent imminent or life-threatening deterioration of the following conditions: Consideration for thrombolysis, neurologic decompensation, death, disability  Critical care was time spent personally by me on the following activities: development of treatment plan with patient or surrogate, discussions with consultants, examination of patient, evaluation of patient's response totreatment, obtaining history from patient or surrogate, ordering and performing treatments and interventions, ordering and review of laboratory studies, ordering and review of radiographic studies and re-evaluation ofpatient's condition, this excludes any separately billable procedures.      MEDICATIONS GIVEN IN THE EMERGENCY:  Medications   iopamidol (ISOVUE-370)  solution 67 mL (67 mLs Intravenous $Given 7/7/24 1928)     And   sodium chloride 0.9 % bag for CT scan flush use (100 mLs As instructed $Given 7/7/24 1929)       NEW PRESCRIPTIONS STARTED AT TODAY'S ER VISIT  New Prescriptions    No medications on file          =================================================================    HPI    Patient information was obtained from: Patient and Patient's Wife    Use of Intrepreter: None       Andrew Haines is a 83 year old male with pertinent medical history of Afib, CAD, CVA, HTN, seizures, strokes, and pacemaker implantation who presents to the ED for evaluation of confusion and twitching.     Per chart review, patient was seen on 7/2/2024 at [facility] for a procedure. Patient had a routine XR chest 1 view done. From the x-ray, nothing related to his pacemaker, leads, heart, or lungs seemed abnormal. Patient was diagnosed with partial symptomatic epilepsy with complex partial seizures, not intractable, without status epilepticus (HRC).    Patient and his wife were driving home when the patient suddenly stopped talking. Wife reports dysphasia and right-handed twitching that lasted ~20-25 minutes. Currently in the ED, patient is more A&O. Able to respond in full sentences. Mentions he felt unsteady when he walks all day.     Wife mentions sometimes he has TIA symptoms like staring off into space and anomic aphasia. Has a history of seizures and strokes, which he takes keppra for. Is on aspirin and has a pacemaker.       PAST MEDICAL HISTORY:  No past medical history on file.    PAST SURGICAL HISTORY:  No past surgical history on file.    CURRENT MEDICATIONS:    Prior to Admission Medications   Prescriptions Last Dose Informant Patient Reported? Taking?   Calcium Carb-Cholecalciferol (CALCIUM-VITAMIN D3) 600-400 MG-UNIT CAPS   Yes No   Sig: Take 2 tablets by mouth   amiodarone (PACERONE) 200 MG tablet   Yes No   Sig: Take 200 mg by mouth   apixaban ANTICOAGULANT  (ELIQUIS ANTICOAGULANT) 5 MG tablet   Yes No   Sig: Take 5 mg by mouth   aspirin 81 MG EC tablet   Yes No   Sig: Take 1 tablet by mouth   ibuprofen (ADVIL/MOTRIN) 200 MG tablet   Yes No   Sig: Take 200 mg by mouth   lisinopril (ZESTRIL) 10 MG tablet   Yes No   Sig: Take 10 mg by mouth   metoprolol succinate ER (TOPROL-XL) 50 MG 24 hr tablet   Yes No   Sig: Take 50 mg by mouth   omeprazole (PRILOSEC) 20 MG DR capsule   Yes No   Sig: Take 20 mg by mouth   rosuvastatin (CRESTOR) 20 MG tablet   Yes No   Sig: Take 20 mg by mouth   triamcinolone (KENALOG) 0.1 % external cream   Yes No      Facility-Administered Medications: None       ALLERGIES:  Allergies   Allergen Reactions    Pollen Extract Cough    Penicillins Rash     Denies throat swelling or breathing difficulty  Denies throat swelling or breathing difficulty         FAMILY HISTORY:  Family History   Problem Relation Age of Onset    Cerebrovascular Disease Father        SOCIAL HISTORY:        VITALS:  Patient Vitals for the past 24 hrs:   BP Temp Temp src Pulse Resp SpO2 Weight   07/07/24 2030 (!) 155/78 -- -- 83 21 96 % --   07/07/24 2015 (!) 173/88 -- -- 85 21 92 % --   07/07/24 1945 (!) 182/90 -- -- 68 19 97 % --   07/07/24 1944 (!) 182/90 -- -- 67 16 99 % --   07/07/24 1930 (!) 160/85 -- -- 69 17 99 % --   07/07/24 1928 (!) 159/82 -- -- 69 16 98 % --   07/07/24 1910 -- -- -- -- -- -- 83.1 kg (183 lb 1.6 oz)   07/07/24 1904 (!) 210/84 97.8  F (36.6  C) Temporal 86 20 99 % --       PHYSICAL EXAM    General Appearance: Well-appearing, well-nourished, no acute distress   Head:  Normocephalic, atraumatic  Eyes:  PERRL, conjunctiva/corneas clear, EOM's intact  ENT:  membranes are moist without pallor  Neck:  Supple  Chest: Maker upper chest wall  Cardio:  Regular rate and rhythm, no murmur/gallop/rub, hypertensive  Pulm:  No respiratory distress, clear to auscultation bilaterally  Extremities: Moves extremities normally  Skin:  Skin warm, dry, no rashes  Neuro:   Alert and oriented ×3, moving all extremities with 5 out of 5 upper lower extremity strength, no gross sensory defects. Cranial nerves 3-12 are intact. No aphasia or dysarthria.        National Institutes of Health Stroke Scale  Exam Interval: Baseline   Score    Level of consciousness: (0)   Alert, keenly responsive    LOC questions: (0)   Answers both questions correctly    LOC commands: (0)   Performs both tasks correctly    Best gaze: (0)   Normal    Visual: (0)   No visual loss    Facial palsy: (0)   Normal symmetrical movements    Motor arm (left): (0)   No drift    Motor arm (right): (0)   No drift    Motor leg (left): (0)   No drift    Motor leg (right): (0)   No drift    Limb ataxia: (0)   Absent    Sensory: (0)   Normal- no sensory loss    Best language: (0)   Normal- no aphasia    Dysarthria: (0)   Normal    Extinction and inattention: (0)   No abnormality        Total Score:  0          RADIOLOGY/LABS:  Reviewed all pertinent imaging. Please see official radiology report. All pertinent labs reviewed and interpreted.    Results for orders placed or performed during the hospital encounter of 07/07/24   CTA Head Neck with Contrast    Impression    IMPRESSION:   HEAD CT:  1.  No CT evidence for acute intracranial process.  2.  Brain atrophy and presumed chronic microvascular ischemic changes as above.    HEAD CTA:  1.  No flow-limiting stenosis, aneurysm, or high flow vascular malformation identified.  2.  Variant [place] anatomy as above.    NECK CTA:  1.  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  2.  Atherosclerotic plaque results in less than 50% stenosis of the right ICA origin.    3.  Dr. Hamilton discussed the above findings by telephone with Dr. Engle at 7:40 PM central time 7/7/2024.     Basic metabolic panel   Result Value Ref Range    Sodium 133 (L) 135 - 145 mmol/L    Potassium 4.7 3.4 - 5.3 mmol/L    Chloride 98 98 - 107 mmol/L    Carbon Dioxide  (CO2) 25 22 - 29 mmol/L    Anion Gap 10 7 - 15 mmol/L    Urea Nitrogen 18.9 8.0 - 23.0 mg/dL    Creatinine 1.17 0.67 - 1.17 mg/dL    GFR Estimate 62 >60 mL/min/1.73m2    Calcium 9.1 8.8 - 10.2 mg/dL    Glucose 98 70 - 99 mg/dL   Result Value Ref Range    INR 1.02 0.85 - 1.15   Partial thromboplastin time   Result Value Ref Range    aPTT 24 22 - 38 Seconds   Result Value Ref Range    Troponin T, High Sensitivity 16 <=22 ng/L   Glucose by meter   Result Value Ref Range    GLUCOSE BY METER POCT 101 (H) 70 - 99 mg/dL   CBC with platelets and differential   Result Value Ref Range    WBC Count 8.4 4.0 - 11.0 10e3/uL    RBC Count 4.49 4.40 - 5.90 10e6/uL    Hemoglobin 14.0 13.3 - 17.7 g/dL    Hematocrit 41.8 40.0 - 53.0 %    MCV 93 78 - 100 fL    MCH 31.2 26.5 - 33.0 pg    MCHC 33.5 31.5 - 36.5 g/dL    RDW 13.2 10.0 - 15.0 %    Platelet Count 203 150 - 450 10e3/uL    % Neutrophils 47 %    % Lymphocytes 32 %    % Monocytes 10 %    % Eosinophils 10 %    % Basophils 1 %    % Immature Granulocytes 0 %    NRBCs per 100 WBC 0 <1 /100    Absolute Neutrophils 3.9 1.6 - 8.3 10e3/uL    Absolute Lymphocytes 2.7 0.8 - 5.3 10e3/uL    Absolute Monocytes 0.8 0.0 - 1.3 10e3/uL    Absolute Eosinophils 0.8 (H) 0.0 - 0.7 10e3/uL    Absolute Basophils 0.1 0.0 - 0.2 10e3/uL    Absolute Immature Granulocytes 0.0 <=0.4 10e3/uL    Absolute NRBCs 0.0 10e3/uL       EKG:  Performed at: 19:30:08    Impression: Atrially sensed. Electronic ventricular pacemaker.    Rate: 69 BPM  Rhythm: Sinus  Axis: -70  OR Interval: 192 ms  QRS Interval: 154 ms  QTc Interval: 454 ms  ST Changes: None  Comparison: No previous ECGs available  I have independently reviewed and interpreted the EKG(s) documented above.    The creation of this record is based on the scribe s observations of the work being performed by Carine Engle MD and the provider s statements to them. It was created on her behalf by Madalyn Emery, a trained medical scribe. This document has been  checked and approved by the attending provider.    Carine Engle MD  Emergency Medicine  [facility] EMERGENCY DEPARTMENT  [facility] [facility] [id number] [phone]  Dept: [phone]     Carine Engle MD  07/07/24 2956

## 2024-07-09 ENCOUNTER — APPOINTMENT (OUTPATIENT)
Dept: NEUROLOGY | Facility: HOSPITAL | Age: 84
DRG: 101 | End: 2024-07-09
Attending: PSYCHIATRY & NEUROLOGY
Payer: COMMERCIAL

## 2024-07-09 ENCOUNTER — APPOINTMENT (OUTPATIENT)
Dept: MRI IMAGING | Facility: HOSPITAL | Age: 84
DRG: 101 | End: 2024-07-09
Attending: INTERNAL MEDICINE
Payer: COMMERCIAL

## 2024-07-09 LAB
ANION GAP SERPL CALCULATED.3IONS-SCNC: 10 MMOL/L (ref 7–15)
BUN SERPL-MCNC: 15.3 MG/DL (ref 8–23)
CALCIUM SERPL-MCNC: 8.4 MG/DL (ref 8.8–10.2)
CHLORIDE SERPL-SCNC: 98 MMOL/L (ref 98–107)
CREAT SERPL-MCNC: 1.06 MG/DL (ref 0.67–1.17)
DEPRECATED HCO3 PLAS-SCNC: 23 MMOL/L (ref 22–29)
EGFRCR SERPLBLD CKD-EPI 2021: 70 ML/MIN/1.73M2
GLUCOSE BLDC GLUCOMTR-MCNC: 101 MG/DL (ref 70–99)
GLUCOSE BLDC GLUCOMTR-MCNC: 118 MG/DL (ref 70–99)
GLUCOSE BLDC GLUCOMTR-MCNC: 120 MG/DL (ref 70–99)
GLUCOSE BLDC GLUCOMTR-MCNC: 147 MG/DL (ref 70–99)
GLUCOSE BLDC GLUCOMTR-MCNC: 97 MG/DL (ref 70–99)
GLUCOSE SERPL-MCNC: 107 MG/DL (ref 70–99)
HOLD SPECIMEN: NORMAL
POTASSIUM SERPL-SCNC: 4.1 MMOL/L (ref 3.4–5.3)
SODIUM SERPL-SCNC: 131 MMOL/L (ref 135–145)

## 2024-07-09 PROCEDURE — 250N000013 HC RX MED GY IP 250 OP 250 PS 637: Performed by: STUDENT IN AN ORGANIZED HEALTH CARE EDUCATION/TRAINING PROGRAM

## 2024-07-09 PROCEDURE — 70553 MRI BRAIN STEM W/O & W/DYE: CPT

## 2024-07-09 PROCEDURE — 250N000013 HC RX MED GY IP 250 OP 250 PS 637: Performed by: INTERNAL MEDICINE

## 2024-07-09 PROCEDURE — 36415 COLL VENOUS BLD VENIPUNCTURE: CPT | Performed by: INTERNAL MEDICINE

## 2024-07-09 PROCEDURE — 95714 VEEG EA 12-26 HR UNMNTR: CPT

## 2024-07-09 PROCEDURE — 95720 EEG PHY/QHP EA INCR W/VEEG: CPT | Performed by: PSYCHIATRY & NEUROLOGY

## 2024-07-09 PROCEDURE — 99233 SBSQ HOSP IP/OBS HIGH 50: CPT | Performed by: INTERNAL MEDICINE

## 2024-07-09 PROCEDURE — 80048 BASIC METABOLIC PNL TOTAL CA: CPT | Performed by: INTERNAL MEDICINE

## 2024-07-09 PROCEDURE — 99221 1ST HOSP IP/OBS SF/LOW 40: CPT | Performed by: PSYCHIATRY & NEUROLOGY

## 2024-07-09 PROCEDURE — 82962 GLUCOSE BLOOD TEST: CPT

## 2024-07-09 PROCEDURE — 200N000001 HC R&B ICU

## 2024-07-09 PROCEDURE — A9585 GADOBUTROL INJECTION: HCPCS | Performed by: INTERNAL MEDICINE

## 2024-07-09 PROCEDURE — 255N000002 HC RX 255 OP 636: Performed by: INTERNAL MEDICINE

## 2024-07-09 RX ORDER — GADOBUTROL 604.72 MG/ML
8 INJECTION INTRAVENOUS ONCE
Status: COMPLETED | OUTPATIENT
Start: 2024-07-09 | End: 2024-07-09

## 2024-07-09 RX ADMIN — LEVETIRACETAM 1000 MG: 500 TABLET, FILM COATED ORAL at 20:56

## 2024-07-09 RX ADMIN — GADOBUTROL 8 ML: 604.72 INJECTION INTRAVENOUS at 09:23

## 2024-07-09 RX ADMIN — LEVETIRACETAM 1000 MG: 500 TABLET, FILM COATED ORAL at 07:37

## 2024-07-09 RX ADMIN — ASPIRIN 325 MG ORAL TABLET 325 MG: 325 PILL ORAL at 07:37

## 2024-07-09 RX ADMIN — ROSUVASTATIN 10 MG: 10 TABLET, FILM COATED ORAL at 07:37

## 2024-07-09 ASSESSMENT — ACTIVITIES OF DAILY LIVING (ADL)
ADLS_ACUITY_SCORE: 32
DRESSING/BATHING_DIFFICULTY: NO
ADLS_ACUITY_SCORE: 32
FALL_HISTORY_WITHIN_LAST_SIX_MONTHS: NO
HEARING_DIFFICULTY_OR_DEAF: NO
ADLS_ACUITY_SCORE: 32
ADLS_ACUITY_SCORE: 45
ADLS_ACUITY_SCORE: 32
COMMUNICATION: DIFFICULTY SPEAKING
ADLS_ACUITY_SCORE: 32
ADLS_ACUITY_SCORE: 45
ADLS_ACUITY_SCORE: 32
ADLS_ACUITY_SCORE: 32
DIFFICULTY_COMMUNICATING: YES
ADLS_ACUITY_SCORE: 32
ADLS_ACUITY_SCORE: 45
ADLS_ACUITY_SCORE: 32
ADLS_ACUITY_SCORE: 32
ADLS_ACUITY_SCORE: 45
ADLS_ACUITY_SCORE: 32
ADLS_ACUITY_SCORE: 45
ADLS_ACUITY_SCORE: 45
DIFFICULTY_EATING/SWALLOWING: NO
ADLS_ACUITY_SCORE: 45
ADLS_ACUITY_SCORE: 32
ADLS_ACUITY_SCORE: 45
ADLS_ACUITY_SCORE: 32
ADLS_ACUITY_SCORE: 45
WALKING_OR_CLIMBING_STAIRS_DIFFICULTY: NO
ADLS_ACUITY_SCORE: 32
DOING_ERRANDS_INDEPENDENTLY_DIFFICULTY: NO
TOILETING_ISSUES: NO
WEAR_GLASSES_OR_BLIND: NO
CONCENTRATING,_REMEMBERING_OR_MAKING_DECISIONS_DIFFICULTY: NO

## 2024-07-09 NOTE — PROGRESS NOTES
Video EEG (LTM) was initiated. The patient has RhythmLink Connect  MRI-CT safe wires applied, although two snap ECG electrodes will need to be removed from the chest prior to MRI. This monitoring includes continuous video and audio recording.    Education regarding the VEEG was provided to the patient, family, and the Nurse.   System used : [id number]    Nayeli MACHADOEEG Tech

## 2024-07-09 NOTE — PROGRESS NOTES
"SIGNIFICANT EVENT    Date of event:7/10/24      DESCRIPTION:    RRT called d/t pt word finding difficulty and unable to follow commands. Possible seizure vs stroke. BP (!) 175/81   Pulse 81   Temp 98.4  F (36.9  C) (Oral)   Resp 18   Ht 1.676 m (5' 6\")   Wt 83.1 kg (183 lb 1.6 oz)   SpO2 96%   BMI 29.55 kg/m    Blood glucose: 118. MRI ordered.     Nadir Ricci RN        "

## 2024-07-09 NOTE — PLAN OF CARE
Goal Outcome Evaluation:      Plan of Care Reviewed With: patient, spouse    Overall Patient Progress: no change    Outcome Evaluation: Continue continuous EEG to assess for seizures that may explain pts difficulty communicating    [facility] - ICU    RN Progress Note:            Pertinent Assessments:      Please refer to flowsheet rows for full assessment     Pt continues to have difficulty with word finding and speech in general.  Pt is able to nod head to yes/no questions appropriately and follows commands but unable to verbalize. Permissive hypertension per neurology, PRN hydralazine available for SBP > 180mmHg           Key Events - This Shift:       Pt arrived to ICU this morning for monitoring and continuous EEG.  Pt most recent NIHSS score is 5 for difficulty with word finding and being unable to answer questions. However pt was able to read off the sentences from NIH booklet without difficulty.  PT appears frustrated with inability to communicate effectively, but is cooperative and fairly easy to redirect.               Barriers to Discharge / Downgrade:     Continuous EEG         Point of Contact Update YES-OR-NO: Yes  Name:[place]incito Haines  Phone Number:Updated at bedside  Summary of Conversation: We will continue to monitor for neurological changes and continue the EEG overnight.  Pt should remain in bed at this time in order to get the best EEG reading possible.

## 2024-07-09 NOTE — PROGRESS NOTES
"[facility]    Medicine Progress Note - Hospitalist Service    Date of Admission:  7/7/2024    Assessment & Plan   83-year-old with known history of seizure disorder, prior stroke A-fib not on anticoagulation who presented with episodes of confusion and finger tapping.  MRI brain is negative for acute stroke.  Keppra level is therapeutic.  Echocardiogram is normal contributory.    Rapid response on 7/9 for word finding difficulty.  MRI of the brain repeated which was negative for stroke.  Patient transferred to ICU for continuous EEG monitoring.  Ordered as needed Ativan for seizures.      Hyponatremia mild  -- Worse than yesterday.  However I do not think hyponatremia is contributing to his word finding difficulties.  -- Urine sodium greater than 20 and urine osmolality greater than 500 indicating SIADH  -- Order fluid restrictions of 1500 ml  -- Monitor sodium level  -- No evidence of hypothyroidism.  Given normal blood pressure, low suspicion for adrenal insufficiency  TSH   Date Value Ref Range Status   07/08/2024 2.28 0.30 - 4.20 uIU/mL Final         Previous stroke  --Continue aspirin/    Seizure disorder  --Likely contributing to his word finding difficulty episodes which are recurrent  --Transferred to ICU for continuous EEG monitoring  --Currently on Keppra            Diet: Combination Diet Low Saturated Fat Na <2400mg Diet  Fluid restriction 1500 ML FLUID    DVT Prophylaxis: Low Risk/Ambulatory with no VTE prophylaxis indicated  Gaviria Catheter: Not present  Lines: None     Cardiac Monitoring: None  Code Status: Full Code      Clinically Significant Risk Factors                  # Hypertension: Noted on problem list              # Overweight: Estimated body mass index is 28.89 kg/m  as calculated from the following:    Height as of this encounter: 1.676 m (5' 6\").    Weight as of this encounter: 81.2 kg (179 lb 0.2 oz)., PRESENT ON ADMISSION     # Financial/Environmental Concerns: " none         Disposition Plan     Medically Ready for Discharge: Anticipated in 2-4 Days             Favio Hill MD  Hospitalist Service  [facility]  Securely message with [employer] (more info)  Text page via [employer] Paging/Directory   ______________________________________________________________________    Interval History   Please see rapid response note for details.  In short patient had episode of word finding difficulty this morning.  Responded to rapid response.  MRI of the brain with and without contrast repeated and was found to be unremarkable.  Discussed with neurology and transfer the patient to ICU for continuous EEG monitoring.  Discussed with patient wife.  Sodium level slightly decreased at 131  Physical Exam   Vital Signs: Temp: 97.5  F (36.4  C) Temp src: Oral BP: (!) 155/80 Pulse: 75   Resp: 23 SpO2: 96 % O2 Device: None (Room air)    Weight: 179 lbs .22 oz    Patient was confused and word finding difficulties  Unable to follow directions 50% of the time  Unable to read clock  Power is 5 out of 5 in all 4 limbs  No facial droop    Medical Decision Making       55 MINUTES SPENT BY ME on the date of service doing chart review, history, exam, documentation & further activities per the note.  MANAGEMENT DISCUSSED with the following over the past 24 hours: Patient's wife, neurologist Dr. Gallagher   NOTE(S)/MEDICAL RECORDS REVIEWED over the past 24 hours: Nursing       Data     I have personally reviewed the following data over the past 24 hrs:    N/A  \   N/A   / N/A     131 (L) 98 15.3 /  147 (H)   4.1 23 1.06 \     TSH: N/A T4: N/A A1C: N/A       Imaging results reviewed over the past 24 hrs:   Recent Results (from the past 24 hour(s))   MR Brain w/o & w Contrast    Narrative    EXAM: MR BRAIN W/O and W CONTRAST  LOCATION: [facility]  DATE: 7/9/2024    INDICATION: word winding difficulty, confusion and unable to answer coherently  COMPARISON: Brain MRI  07/08/2024, head and neck CT angiogram 07/07/2024. Brain MRI 07/02/2024  CONTRAST: 8ml gadavist  TECHNIQUE: Routine multiplanar multisequence head MRI without and with intravenous contrast.    FINDINGS:  INTRACRANIAL CONTENTS: Diffusion-weighted images demonstrate no areas of restricted diffusion. No subacute or chronic intracranial hemorrhage. Mild diffuse cerebral parenchymal volume loss. No midline shift. The basilar cisterns are patent. Moderate   periventricular and scattered foci of deep white matter T2 prolongation involving both cerebral hemispheres. No cerebellar tonsillar ectopia. Moderate area of encephalomalacia and gliosis involving the lateral left cerebellar hemisphere. Small areas of   chronic infarcts involving the right cerebellar hemisphere. No abnormal enhancement.    SELLA: Partially empty sella appearance.    OSSEOUS STRUCTURES/SOFT TISSUES: Normal marrow signal. The major intracranial vascular flow voids are maintained.     ORBITS: Prior bilateral cataract surgery. Visualized portions of the orbits are otherwise unremarkable.     SINUSES/MASTOIDS: Mild mucosal thickening of the ethmoid air cells. No middle ear or mastoid effusion.       Impression    IMPRESSION:  1.  No acute/subacute infarcts, mass lesions, hydrocephalus or MRI evidence of intracranial hemorrhage. No abnormal intracranial enhancement.  2.  Mild diffuse cerebral parenchymal volume loss. Presumed chronic hypertensive/microvascular ischemic white matter changes.  3.  Moderate area of encephalomalacia and gliosis involving the lateral left cerebellar hemisphere. Small areas of chronic infarcts involving the right cerebellar hemisphere.

## 2024-07-09 NOTE — PHARMACY-CONSULT NOTE
Pharmacy Consult to evaluate for medication related stroke core measures    Andrew Haines, 83 year old male admitted for confusion and aphasia with concern for stroke on 7/7/2024.    Thrombolytic was not given because of resolution of symptoms    VTE Prophylaxis SCDs /PCDs placed on 7/9 AM, as appropriate prior to end of hospital day 2.    Antithrombotic: aspirin started on 7/8 (continued PTA aspirin 325 mg), as appropriate by end of hospital day 2. Continue antithrombotic therapy on discharge to meet quality measures, unless contraindicated.    Anticoagulation if history of A-fib/flutter: Although patient has history of afib, he is NOT currently on anticoagulation at this time (previously on Xarelto - discontinued per outpatient cardiologist). Per neuro, patient would prefer to talk to his OP Cardiologist about any need for anticoagulation going forward.    LDL Cholesterol Calculated   Date Value Ref Range Status   07/07/2024 71 <=100 mg/dL Final       Patient currently receiving Crestor (rosuvastatin) continue statin on discharge to meet quality measures, unless contraindicated.    Recommendations: None at this time    Thank you for the consult.    DEJA CALVIN [facility] 7/9/2024 7:43 AM  
5
None

## 2024-07-09 NOTE — PLAN OF CARE
"PRIMARY DIAGNOSIS: \"GENERIC\" NURSING  OUTPATIENT/OBSERVATION GOALS TO BE MET BEFORE DISCHARGE:  ADLs back to baseline: Yes    Activity and level of assistance: Up with standby assistance.    Pain status: Pain free.    Return to near baseline physical activity: No     Discharge Planner Nurse   Safe discharge environment identified: Yes  Barriers to discharge: Yes       Entered by: Carmen Fonseca RN 07/08/2024 9:30 PM     Please review provider order for any additional goals.   Nurse to notify provider when observation goals have been met and patient is ready for discharge.                        "

## 2024-07-09 NOTE — SIGNIFICANT EVENT
Significant Event Note    Time of event: 8:02 AM July 9, 2024    Description of event:  House officer responded to rapid response, per RN patient was experiencing change in mental status, difficulty word finding, dysarthria. Dr. Hill, primary hospitalist, assumed care and dismissed house officer.     Plan:  Management per primary hospitalist team    Discussed with: bedside nurse    Gregory Craft MD

## 2024-07-09 NOTE — PLAN OF CARE
"  Problem: Adult Inpatient Plan of Care  Goal: Plan of Care Review  Description: The Plan of Care Review/Shift note should be completed every shift.  The Outcome Evaluation is a brief statement about your assessment that the patient is improving, declining, or no change.  This information will be displayed automatically on your shift  note.  Outcome: Not Progressing  Flowsheets (Taken 7/9/2024 1015)  Plan of Care Reviewed With:   patient   spouse     Problem: Adult Inpatient Plan of Care  Goal: Patient-Specific Goal (Individualized)  Description: You can add care plan individualizations to a care plan. Examples of Individualization might be:  \"Parent requests to be called daily at 9am for status\", \"I have a hard time hearing out of my right ear\", or \"Do not touch me to wake me up as it startles  me\".  Outcome: Not Progressing     Problem: Adult Inpatient Plan of Care  Goal: Absence of Hospital-Acquired Illness or Injury  Intervention: Identify and Manage Fall Risk  Recent Flowsheet Documentation  Taken 7/9/2024 0741 by Nadir Storey, RN  Safety Promotion/Fall Prevention:   activity supervised   clutter free environment maintained   lighting adjusted   nonskid shoes/slippers when out of bed     Goal Outcome Evaluation:      Plan of Care Reviewed With: patient, spouse      Not progressing           "

## 2024-07-09 NOTE — PLAN OF CARE
"PRIMARY DIAGNOSIS: \"GENERIC\" NURSING  OUTPATIENT/OBSERVATION GOALS TO BE MET BEFORE DISCHARGE:  ADLs back to baseline: Yes    Activity and level of assistance: Up with standby assistance.    Pain status: Pain free.    Return to near baseline physical activity: Yes     Discharge Planner Nurse   Safe discharge environment identified: No  Barriers to discharge: No       Entered by: Carmen Fonseca RN 07/09/2024 4:18 AM     Please review provider order for any additional goals.   Nurse to notify provider when observation goals have been met and patient is ready for discharge.                    "

## 2024-07-09 NOTE — PLAN OF CARE
"Goal Outcome Evaluation:                  PRIMARY DIAGNOSIS: \"GENERIC\" NURSING  OUTPATIENT/OBSERVATION GOALS TO BE MET BEFORE DISCHARGE:  ADLs back to baseline: {YES / NO:460870::\"Yes\"}    Activity and level of assistance: {OBSAmbulationStatus:316860}    Pain status: {OBSPainStatus:864966}    Return to near baseline physical activity: {YES / NO:409325::\"Yes\"}     Discharge Planner Nurse   Safe discharge environment identified: {YES / NO:050339::\"Yes\"}  Barriers to discharge: {YES / NO:891645::\"Yes\"}       Entered by: Carmen Fonseca RN 07/08/2024 9:19 PM     Please review provider order for any additional goals.   Nurse to notify provider when observation goals have been met and patient is ready for discharge.      "

## 2024-07-09 NOTE — UTILIZATION REVIEW
Admission Status; Secondary Review Determination   Under the authority of the Utilization Management Committee, the utilization review process indicated a secondary review on the above patient. The review outcome is based on review of the medical records, discussions with staff, and applying clinical experience noted on the date of the review.   (x) Inpatient Status Appropriate - This patient's medical care is consistent with medical management for inpatient care and reasonable inpatient medical practice.   RATIONALE FOR DETERMINATION   An 83-year-old male with a history of paroxysmal atrial fibrillation and stroke was admitted on 7/7/2024, presenting with confusion and aphasia - evaluated for a transient ischemic attack (TIA) versus seizure. 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 EEG nondiagnostic. Labs showed a Keppra level of 28.5. Permissive hypertension is being maintained with SBP <180. Neurology, PT, OT, and SLP consultations have been initiated. An MRI of the brain  on 7/8, 7//9 without acute findings.    This am RRT was called d/t pt having new word-finding difficulty and increased confusion.  VItals and blood sugar stable.  Neuro requesting transfer to the ICU for continuous EEG monitoring.  Also noted to have significant hyponatremia that is worse today and will need close clinical monitoring and frequent lab checks.   At this time he is requiring ongoing inpatient evaluation, treatment and close clinical monitoring.  At the time of admission with the information available to the attending physician more than 2 nights Hospital complex care was anticipated, based on patient risk of adverse outcome if treated as outpatient and complex care required. Inpatient admission is  appropriate based on the Medicare guidelines.   The information on this document is developed by the utilization review team in order for the business office to ensure compliance. This only denotes the appropriateness of proper admission status and does not reflect the quality of care rendered.   The definitions of Inpatient Status and Observation Status used in making the determination above are those provided in the CMS Coverage Manual, Chapter 1 and Chapter 6, section 70.4.     Sincerely,     Kelly Bonds, DO  Utilization Review  Physician Advisor

## 2024-07-09 NOTE — SIGNIFICANT EVENT
Significant Event Note    Time of event: 8:12 AM July 9, 2024    Description of event:  Rapid response was called due to word finding difficulty and increasing confusion.  Blood sugars was within normal limits.  Vital signs included blood pressure 179 pulse of 79.  Patient denies any chest pain. Tele should paced rhythm. He able to identify my pen but was not able to read the clock.  He was unable to recognize his wife at bedside.  Power was 5 out of 5 in all 4 limbs.  Babinski sign was equivocal.  Patient was unable to follow directions 50% of the time.  This lasted about 20 to 30 minutes.  Thereafter he was recognize his wife.  Sodium level of 131 this morning.    Plan:  Differential diagnosis TIA versus stroke versus seizure.  Hyponatremia 131 would not cause word finding difficulty.  Nursing requested to inform neurology regarding the same.  Defer continuous EEG monitoring to neurology    Discussed with: patient's wife and bedside nurse    Favio Hill MD    Addendum Favio Hill MD, Hospitalist,07/09/24,8:53 AM  Spoke to Dr. Chau from neurology about starting continuous EEG to capture abnormalities while patient is symptomatic.  She agrees with the plan.  Discussed with Dr. Erickson about ICU transfer for continuous EEG and he agreed with the plan.  Transfer orders put in.  Continuous EEG to be ordered by neurology.

## 2024-07-09 NOTE — PROGRESS NOTES
NEUROLOGY PROGRESS NOTE     ASSESSMENT & PLAN     Diagnosis: Suspected TIA     84 yo man with history of epilepsy, prior stroke and afib (not on anticoagulant) presenting with episodes of confusion/finger-tapping. Recent work-up for similar     CT/CTA shows nothing acute  MRI brain  shows nothing acute; multiple chronic infarcts  Keppra level therapeutic at 28.5. Continue PTA dosing  Echocardiogram is noncontributory  Now undergoing VEEG, given recurrent episodes  A1c: 5.8%.  LDL 71. Continue lower dose of rosuvastatin  Continue daily ASA (now 325mg)  Physical therapy/occupational therapy  Neurochecks and telemetry  Patient would prefer to talk to his OP Cardiologist about any need for anticoagulation going forward  Neurology will follow along     Neurology Discharge Planning:  TBD    Patient Active Problem List    Diagnosis Date Noted    Spell of behavior change 07/07/2024     Priority: Medium    Paroxysmal atrial fibrillation (H) 06/14/2021     Priority: Medium    Partial symptomatic epilepsy with complex partial seizures, not intractable, without status epilepticus (H) 10/23/2020     Priority: Medium    Third degree heart block (H) 07/02/2020     Priority: Medium    Syncope, unspecified syncope type 07/16/2018     Priority: Medium    Murmur, cardiac 04/03/2017     Priority: Medium    Bilateral carotid artery disease (H24)      Priority: Medium     Created by Conversion        Hypertension      Priority: Medium     Created by Conversion  Replacement Utility updated for latest IMO load         Medical History  No past medical history on file.     SUBJECTIVE     Some episodes of pointing yesterday afternoon. Patient is reportedly A&O this AM.  In reviewing previous Neuro notes, patient also has been diagnosed with MCI.    Andrew's wife and another family member at bedside.  He has been hooked up to the EEG and tech is present.  No specific significant findings other than the left-sided sharps so far.  Andrew is  having trouble expressing himself as I talk to him.  Typical of one of his episodes.     OBJECTIVE     Vital signs in last 24 hours  Temp:  [97.3  F (36.3  C)-98.5  F (36.9  C)] 97.8  F (36.6  C)  Pulse:  [67-73] 69  Resp:  [18-20] 18  BP: (120-159)/(58-79) 143/79  SpO2:  [92 %-97 %] 95 %    Weight:   [unfilled]    Review of Systems   Review of systems not obtained due to patient factors.    General Physical Exam: Vital signs were reviewed and are documented in EMR.   Neurological Exam:  Comprehension appears to be normal, but he is having a great deal of difficulty with word finding.  Appropriate when he is able to answer.  No specific weakness with brief testing.  No abnormal movements or rhythmic muscle activity.     DIAGNOSTIC STUDIES     Pertinent Radiology   Radiology Results: Not yet available for review      Pertinent Labs   Lab Results: personally reviewed.   Recent Results (from the past 24 hour(s))   Glucose by meter    Collection Time: 07/08/24  7:51 AM   Result Value Ref Range    GLUCOSE BY METER POCT 113 (H) 70 - 99 mg/dL   Echocardiogram Complete - For age > 60 yrs    Collection Time: 07/08/24  9:00 AM   Result Value Ref Range    LVEF  60-65%    Glucose by meter    Collection Time: 07/08/24 11:51 AM   Result Value Ref Range    GLUCOSE BY METER POCT 101 (H) 70 - 99 mg/dL   Sodium random urine    Collection Time: 07/08/24  5:32 PM   Result Value Ref Range    Sodium Urine mmol/L 88 mmol/L   Osmolality urine    Collection Time: 07/08/24  5:32 PM   Result Value Ref Range    Osmolality Urine 638 100 - 1,200 mmol/kg   Glucose by meter    Collection Time: 07/08/24  5:38 PM   Result Value Ref Range    GLUCOSE BY METER POCT 115 (H) 70 - 99 mg/dL   Glucose by meter    Collection Time: 07/08/24  9:10 PM   Result Value Ref Range    GLUCOSE BY METER POCT 119 (H) 70 - 99 mg/dL         HOSPITAL MEDICATIONS     Current Facility-Administered Medications   Medication Dose Route Frequency Provider Last Rate Last Admin     aspirin (ASA) tablet 325 mg  325 mg Oral Daily Kimberly Rios MD   325 mg at 07/08/24 0851    levETIRAcetam (KEPPRA) tablet 1,000 mg  1,000 mg Oral BID Kimberly Rios MD   1,000 mg at 07/08/24 2025    [Held by provider] metoprolol succinate ER (TOPROL XL) 24 hr tablet 50 mg  50 mg Oral Daily Kimberly Rios MD        rosuvastatin (CRESTOR) tablet 10 mg  10 mg Oral Daily Kimberly Rios MD   10 mg at 07/08/24 0851    sodium chloride (PF) 0.9% PF flush 3 mL  3 mL Intracatheter Q8H Kimberly Rios MD   3 mL at 07/09/24 0622        Total time spent for face to face visit, reviewing labs/imaging studies, counseling and coordination of care was: 35 Minutes. More than 50% of this time was spent on counseling and coordination of care.    Dragon software used in the dictation on this note.    Sera Chau MD  [facility] - [address], [address]  [place] [address]

## 2024-07-09 NOTE — PLAN OF CARE
"PRIMARY DIAGNOSIS: \"GENERIC\" NURSING  OUTPATIENT/OBSERVATION GOALS TO BE MET BEFORE DISCHARGE:  ADLs back to baseline: Yes    Activity and level of assistance: Up with standby assistance.    Pain status: Pain free.    Return to near baseline physical activity: Yes     Discharge Planner Nurse   Safe discharge environment identified: Yes  Barriers to discharge: No       Entered by: Carmen Fonseca RN 07/09/2024 12:44 AM     Please review provider order for any additional goals.   Nurse to notify provider when observation goals have been met and patient is ready for discharge.Goal Outcome Evaluation: Pt alert and oriented x4. Makes his needs known. Calls appropriately. NIH=0. Speech is clear and logical. Voiding without difficulty using urinal at bedside.  Denies pain, SOB. Continue to monitor.                         "

## 2024-07-10 ENCOUNTER — APPOINTMENT (OUTPATIENT)
Dept: NEUROLOGY | Facility: HOSPITAL | Age: 84
DRG: 101 | End: 2024-07-10
Attending: PSYCHIATRY & NEUROLOGY
Payer: COMMERCIAL

## 2024-07-10 LAB
ANION GAP SERPL CALCULATED.3IONS-SCNC: 8 MMOL/L (ref 7–15)
BUN SERPL-MCNC: 13.5 MG/DL (ref 8–23)
CALCIUM SERPL-MCNC: 8.3 MG/DL (ref 8.8–10.2)
CHLORIDE SERPL-SCNC: 102 MMOL/L (ref 98–107)
CREAT SERPL-MCNC: 1 MG/DL (ref 0.67–1.17)
DEPRECATED HCO3 PLAS-SCNC: 25 MMOL/L (ref 22–29)
EGFRCR SERPLBLD CKD-EPI 2021: 75 ML/MIN/1.73M2
GLUCOSE BLDC GLUCOMTR-MCNC: 102 MG/DL (ref 70–99)
GLUCOSE BLDC GLUCOMTR-MCNC: 115 MG/DL (ref 70–99)
GLUCOSE SERPL-MCNC: 102 MG/DL (ref 70–99)
POTASSIUM SERPL-SCNC: 4.3 MMOL/L (ref 3.4–5.3)
SODIUM SERPL-SCNC: 135 MMOL/L (ref 135–145)

## 2024-07-10 PROCEDURE — 99233 SBSQ HOSP IP/OBS HIGH 50: CPT | Performed by: PSYCHIATRY & NEUROLOGY

## 2024-07-10 PROCEDURE — 36415 COLL VENOUS BLD VENIPUNCTURE: CPT | Performed by: INTERNAL MEDICINE

## 2024-07-10 PROCEDURE — 250N000013 HC RX MED GY IP 250 OP 250 PS 637: Performed by: INTERNAL MEDICINE

## 2024-07-10 PROCEDURE — 250N000013 HC RX MED GY IP 250 OP 250 PS 637: Performed by: PSYCHIATRY & NEUROLOGY

## 2024-07-10 PROCEDURE — 80048 BASIC METABOLIC PNL TOTAL CA: CPT | Performed by: INTERNAL MEDICINE

## 2024-07-10 PROCEDURE — 99418 PROLNG IP/OBS E/M EA 15 MIN: CPT | Performed by: PSYCHIATRY & NEUROLOGY

## 2024-07-10 PROCEDURE — 200N000001 HC R&B ICU

## 2024-07-10 PROCEDURE — 95714 VEEG EA 12-26 HR UNMNTR: CPT

## 2024-07-10 PROCEDURE — 99233 SBSQ HOSP IP/OBS HIGH 50: CPT | Performed by: FAMILY MEDICINE

## 2024-07-10 RX ORDER — DIVALPROEX SODIUM 500 MG/1
500 TABLET, DELAYED RELEASE ORAL EVERY 12 HOURS SCHEDULED
Status: DISCONTINUED | OUTPATIENT
Start: 2024-07-10 | End: 2024-07-16 | Stop reason: HOSPADM

## 2024-07-10 RX ADMIN — LEVETIRACETAM 1000 MG: 500 TABLET, FILM COATED ORAL at 20:13

## 2024-07-10 RX ADMIN — ROSUVASTATIN 10 MG: 10 TABLET, FILM COATED ORAL at 09:59

## 2024-07-10 RX ADMIN — DIVALPROEX SODIUM 500 MG: 500 TABLET, DELAYED RELEASE ORAL at 09:59

## 2024-07-10 RX ADMIN — DIVALPROEX SODIUM 500 MG: 500 TABLET, DELAYED RELEASE ORAL at 20:13

## 2024-07-10 RX ADMIN — LEVETIRACETAM 1000 MG: 500 TABLET, FILM COATED ORAL at 09:58

## 2024-07-10 RX ADMIN — ASPIRIN 325 MG ORAL TABLET 325 MG: 325 PILL ORAL at 09:59

## 2024-07-10 ASSESSMENT — ACTIVITIES OF DAILY LIVING (ADL)
ADLS_ACUITY_SCORE: 33
ADLS_ACUITY_SCORE: 32
ADLS_ACUITY_SCORE: 31
ADLS_ACUITY_SCORE: 32
ADLS_ACUITY_SCORE: 31
ADLS_ACUITY_SCORE: 33
ADLS_ACUITY_SCORE: 32
ADLS_ACUITY_SCORE: 31
ADLS_ACUITY_SCORE: 32
ADLS_ACUITY_SCORE: 32
ADLS_ACUITY_SCORE: 33
ADLS_ACUITY_SCORE: 32

## 2024-07-10 NOTE — PLAN OF CARE
[facility] - ICU    RN Progress Note:            Pertinent Assessments:      Please refer to flowsheet rows for full assessment     Patient is alert and oriented, makes his needs known, follows commands. Word finding has improved and speech is more clear.           Key Events - This Shift:   Uneventful               Barriers to Discharge / Downgrade:     EEG continues

## 2024-07-10 NOTE — PROGRESS NOTES
Video EEG was re-started for another 24 hrs. The patient is wearing MRI/CT safe leads but 2 ecg snap leads will need to be removed from the chest before MRI. This monitoring includes continuous video and audio recording.    Open Utility system used.

## 2024-07-10 NOTE — PROGRESS NOTES
"NEUROLOGY PROGRESS NOTE     ASSESSMENT & PLAN     Diagnosis: Recurrent seizures     84 yo man with history of epilepsy, prior stroke and afib (not on anticoagulant) presenting with episodes of confusion/finger-tapping. Recent work-up for similar     CT/CTA shows nothing acute  MRI brain  shows nothing acute; multiple chronic infarcts  Keppra level therapeutic at 28.5. Continue PTA dosing  Echocardiogram is noncontributory  Now undergoing VEEG, given recurrent episodes - shows rhythmic activity concerning for underlying seizures. Depakote added this AM, will continue to monitor today  A1c: 5.8%.  LDL 71. Continue lower dose of rosuvastatin  Continue daily ASA (now 325mg)  Neurochecks and telemetry  Patient would prefer to talk to his OP Cardiologist about any need for anticoagulation going forward, though now the symptoms are seeming less like TIA and more seizure-related  Neurology will follow along     Neurology Discharge Planning:  TBD    Patient Active Problem List    Diagnosis Date Noted    Spell of behavior change 07/07/2024     Priority: Medium    Paroxysmal atrial fibrillation (H) 06/14/2021     Priority: Medium    Partial symptomatic epilepsy with complex partial seizures, not intractable, without status epilepticus (H) 10/23/2020     Priority: Medium    Third degree heart block (H) 07/02/2020     Priority: Medium    Syncope, unspecified syncope type 07/16/2018     Priority: Medium    Murmur, cardiac 04/03/2017     Priority: Medium    Bilateral carotid artery disease (H24)      Priority: Medium     Created by Conversion        Hypertension      Priority: Medium     Created by Conversion  Replacement Utility updated for latest IMO load         Medical History  No past medical history on file.     SUBJECTIVE     Some improvement of speech overnight.     Spoke with nurse at bedside and she tells me that Andrew will often answer questions with \"name.\"  He will say this over and over again at times.  She gave " him the Depakote I ordered and the Keppra this morning.     OBJECTIVE     Vital signs in last 24 hours  Temp:  [97.4  F (36.3  C)-98  F (36.7  C)] 97.5  F (36.4  C)  Pulse:  [69-88] 78  Resp:  [10-26] 15  BP: (134-177)/(62-93) 147/71  SpO2:  [90 %-97 %] 90 %    Weight:   [unfilled]    Review of Systems   Review of systems not obtained due to patient factors.    General Physical Exam: Patient is alert and oriented x 3. Vital signs were reviewed and are documented in EMR.   Neurological Exam:  Attentive, interactive.  He continues to have word finding difficulties which fluctuates.  No abnormal movements noted on exam.     DIAGNOSTIC STUDIES     Pertinent Radiology   Radiology Results: Personally reviewed image/s    Electroencephalogram  Impression: This is an abnormal EEG due to diffusely slow background in theta and delta range associated with frequent left frontal sharp discharges that suggest low threshold for seizures.  At time these discharges assume rhythmic character suggesting EEG seizures.  Clinical correlation is recommended.     Classification: Dysrhythmia grade III left frontal                           Dysrhythmia grade II generalized, maximum left    Pertinent Labs   Lab Results: personally reviewed.   Recent Results (from the past 24 hour(s))   Glucose by meter    Collection Time: 07/09/24 12:09 PM   Result Value Ref Range    GLUCOSE BY METER POCT 147 (H) 70 - 99 mg/dL   Glucose by meter    Collection Time: 07/09/24  4:43 PM   Result Value Ref Range    GLUCOSE BY METER POCT 120 (H) 70 - 99 mg/dL   Glucose by meter    Collection Time: 07/09/24 11:18 PM   Result Value Ref Range    GLUCOSE BY METER POCT 97 70 - 99 mg/dL   Glucose by meter    Collection Time: 07/10/24  5:01 AM   Result Value Ref Range    GLUCOSE BY METER POCT 102 (H) 70 - 99 mg/dL   Basic metabolic panel    Collection Time: 07/10/24  5:14 AM   Result Value Ref Range    Sodium 135 135 - 145 mmol/L    Potassium 4.3 3.4 - 5.3 mmol/L     Chloride 102 98 - 107 mmol/L    Carbon Dioxide (CO2) 25 22 - 29 mmol/L    Anion Gap 8 7 - 15 mmol/L    Urea Nitrogen 13.5 8.0 - 23.0 mg/dL    Creatinine 1.00 0.67 - 1.17 mg/dL    GFR Estimate 75 >60 mL/min/1.73m2    Calcium 8.3 (L) 8.8 - 10.2 mg/dL    Glucose 102 (H) 70 - 99 mg/dL         HOSPITAL MEDICATIONS     Current Facility-Administered Medications   Medication Dose Route Frequency Provider Last Rate Last Admin    aspirin (ASA) tablet 325 mg  325 mg Oral Daily Favio Hill MD   325 mg at 07/09/24 0737    divalproex sodium delayed-release (DEPAKOTE) DR tablet 500 mg  500 mg Oral Q12H [facility] (08/20) Sera Harris MD        levETIRAcetam (KEPPRA) tablet 1,000 mg  1,000 mg Oral BID Favio Hill MD   1,000 mg at 07/09/24 2056    [Held by provider] metoprolol succinate ER (TOPROL XL) 24 hr tablet 50 mg  50 mg Oral Daily Kimberly Rios MD        rosuvastatin (CRESTOR) tablet 10 mg  10 mg Oral Daily Favio Hill MD   10 mg at 07/09/24 0737    sodium chloride (PF) 0.9% PF flush 3 mL  3 mL Intracatheter Q8H Favio Hill MD   3 mL at 07/10/24 0639        Total time spent for face to face visit, reviewing labs/imaging studies, counseling and coordination of care was: 1 Hour 15 Minutes. More than 50% of this time was spent on counseling and coordination of care.    Dragon software used in the dictation on this note.    Sera Chau MD  [facility] - [address], [address]  [place] [address]

## 2024-07-10 NOTE — PROGRESS NOTES
Care Management Follow Up    Length of Stay (days): 3    Expected Discharge Date: 07/13/2024    Anticipated Discharge Plan:   TBD    Transportation: TBD    PT Recommendations:  eval and recs pending  OT Recommendations: eval and recs pending      Barriers to Discharge: medical stability, neuro status, diagnostic work up    Prior Living Situation: Pt lives in a house with his wife [place]. He is largely independent with ADLs and IADLs. No longer driving, Chapincito drives. Has a . No other equipment or services. Goal is to discharge home, [place] to transport home.       Patient/Spokesperson Updated: No    Additional Information:  Medical:  Patient with known history of seizure disorder, prior stroke A-fib not on anticoagulation who presented with episodes of confusion and finger tapping.  MRI brain is negative for acute stroke.  Keppra level is therapeutic.  Echocardiogram is normal contributory.    Rapid response on 7/9 for word finding difficulty.  MRI of the brain repeated which was negative for stroke.  Patient transferred to ICU for continuous EEG monitoring.  Ordered as needed Ativan for seizures.  Neurology following.      7/10/24:  Patient is not medically stable to initiate discharge planning.           Gwendolyn Burch RN

## 2024-07-10 NOTE — PROGRESS NOTES
"[facility]    Medicine Progress Note - Hospitalist Service    Date of Admission:  7/7/2024    Assessment & Plan   83-year-old with known history of seizure disorder, prior stroke A-fib not on anticoagulation who presented with episodes of confusion and finger tapping.  MRI brain is negative for acute stroke.  Keppra level is therapeutic.  Echocardiogram is normal contributory.    Rapid response on 7/9 for word finding difficulty.  MRI of the brain repeated which was negative for stroke.  Patient transferred to ICU for continuous EEG monitoring.  Ordered as needed Ativan for seizures. Depakote started 7/10.      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  -- No evidence of hypothyroidism.  Given normal blood pressure, low suspicion for adrenal insufficiency  TSH   Date Value Ref Range Status   07/08/2024 2.28 0.30 - 4.20 uIU/mL Final         Previous stroke  --Continue aspirin     Seizure disorder  --Likely contributing to his word finding difficulty episodes which are recurrent  --Transferred to ICU for continuous EEG monitoring  --Currently on Keppra, depakote started 7/10  -- Neuro following            Diet: Combination Diet Low Saturated Fat Na <2400mg Diet  Fluid restriction 1500 ML FLUID    DVT Prophylaxis: Low Risk/Ambulatory with no VTE prophylaxis indicated  Gaviria Catheter: Not present  Lines: None     Cardiac Monitoring: None  Code Status: Full Code      Clinically Significant Risk Factors                  # Hypertension: Noted on problem list              # Overweight: Estimated body mass index is 28.84 kg/m  as calculated from the following:    Height as of this encounter: 1.676 m (5' 6\").    Weight as of this encounter: 81.1 kg (178 lb 11.2 oz)., PRESENT ON ADMISSION       # Financial/Environmental Concerns: none     "     Disposition Plan     Medically Ready for Discharge: Anticipated in 2-4 Days             Pineda Wilburn MD  Hospitalist Service  [facility]  Securely message with [employer] (more info)  Text page via [employer] Paging/Directory   ______________________________________________________________________    Interval History   Patient resting in bed non-verbal. Has EEG leads in place. Makes eye contact, purposeful movements.    Physical Exam   Vital Signs: Temp: 98.1  F (36.7  C) Temp src: Oral BP: 120/65 Pulse: 82   Resp: 21 SpO2: 94 % O2 Device: None (Room air)    Weight: 178 lbs 11.2 oz    Patient was confused and word finding difficulties  Able to follow simple directions  Power is 5 out of 5 in all 4 limbs  No facial droop  Normal S1 S2  Lungs clear  No visible rash    Medical Decision Making       55 MINUTES SPENT BY ME on the date of service doing chart review, history, exam, documentation & further activities per the note.  MANAGEMENT DISCUSSED with the following over the past 24 hours: Patient's wife, neurologist Dr. Gallagher   NOTE(S)/MEDICAL RECORDS REVIEWED over the past 24 hours: Nursing       Data     I have personally reviewed the following data over the past 24 hrs:    N/A  \   N/A   / N/A     135 102 13.5 /  102 (H)   4.3 25 1.00 \       Imaging results reviewed over the past 24 hrs:   No results found for this or any previous visit (from the past 24 hour(s)).

## 2024-07-11 ENCOUNTER — APPOINTMENT (OUTPATIENT)
Dept: PHYSICAL THERAPY | Facility: HOSPITAL | Age: 84
DRG: 101 | End: 2024-07-11
Attending: INTERNAL MEDICINE
Payer: COMMERCIAL

## 2024-07-11 LAB
ERYTHROCYTE [DISTWIDTH] IN BLOOD BY AUTOMATED COUNT: 13.3 % (ref 10–15)
GLUCOSE BLDC GLUCOMTR-MCNC: 152 MG/DL (ref 70–99)
GLUCOSE BLDC GLUCOMTR-MCNC: 92 MG/DL (ref 70–99)
HCT VFR BLD AUTO: 38 % (ref 40–53)
HGB BLD-MCNC: 12.9 G/DL (ref 13.3–17.7)
MCH RBC QN AUTO: 31.3 PG (ref 26.5–33)
MCHC RBC AUTO-ENTMCNC: 33.9 G/DL (ref 31.5–36.5)
MCV RBC AUTO: 92 FL (ref 78–100)
PLATELET # BLD AUTO: 165 10E3/UL (ref 150–450)
RBC # BLD AUTO: 4.12 10E6/UL (ref 4.4–5.9)
SODIUM SERPL-SCNC: 134 MMOL/L (ref 135–145)
WBC # BLD AUTO: 8.3 10E3/UL (ref 4–11)

## 2024-07-11 PROCEDURE — 36415 COLL VENOUS BLD VENIPUNCTURE: CPT | Performed by: FAMILY MEDICINE

## 2024-07-11 PROCEDURE — 97530 THERAPEUTIC ACTIVITIES: CPT | Mod: GP

## 2024-07-11 PROCEDURE — 99232 SBSQ HOSP IP/OBS MODERATE 35: CPT | Performed by: INTERNAL MEDICINE

## 2024-07-11 PROCEDURE — 36415 COLL VENOUS BLD VENIPUNCTURE: CPT | Performed by: PSYCHIATRY & NEUROLOGY

## 2024-07-11 PROCEDURE — 250N000013 HC RX MED GY IP 250 OP 250 PS 637: Performed by: INTERNAL MEDICINE

## 2024-07-11 PROCEDURE — 97161 PT EVAL LOW COMPLEX 20 MIN: CPT | Mod: GP

## 2024-07-11 PROCEDURE — 80165 DIPROPYLACETIC ACID FREE: CPT | Performed by: PSYCHIATRY & NEUROLOGY

## 2024-07-11 PROCEDURE — 84295 ASSAY OF SERUM SODIUM: CPT | Performed by: FAMILY MEDICINE

## 2024-07-11 PROCEDURE — 95720 EEG PHY/QHP EA INCR W/VEEG: CPT | Performed by: PSYCHIATRY & NEUROLOGY

## 2024-07-11 PROCEDURE — 85027 COMPLETE CBC AUTOMATED: CPT | Performed by: FAMILY MEDICINE

## 2024-07-11 PROCEDURE — 97116 GAIT TRAINING THERAPY: CPT | Mod: GP

## 2024-07-11 PROCEDURE — 250N000013 HC RX MED GY IP 250 OP 250 PS 637: Performed by: PSYCHIATRY & NEUROLOGY

## 2024-07-11 PROCEDURE — 200N000001 HC R&B ICU

## 2024-07-11 RX ADMIN — LEVETIRACETAM 1000 MG: 500 TABLET, FILM COATED ORAL at 08:25

## 2024-07-11 RX ADMIN — LEVETIRACETAM 1000 MG: 500 TABLET, FILM COATED ORAL at 19:43

## 2024-07-11 RX ADMIN — DIVALPROEX SODIUM 500 MG: 500 TABLET, DELAYED RELEASE ORAL at 08:24

## 2024-07-11 RX ADMIN — DIVALPROEX SODIUM 500 MG: 500 TABLET, DELAYED RELEASE ORAL at 19:43

## 2024-07-11 RX ADMIN — ROSUVASTATIN 10 MG: 10 TABLET, FILM COATED ORAL at 08:25

## 2024-07-11 RX ADMIN — ASPIRIN 325 MG ORAL TABLET 325 MG: 325 PILL ORAL at 08:25

## 2024-07-11 ASSESSMENT — ACTIVITIES OF DAILY LIVING (ADL)
ADLS_ACUITY_SCORE: 31

## 2024-07-11 NOTE — PROGRESS NOTES
NEUROLOGY PROGRESS NOTE     ASSESSMENT & PLAN     Diagnosis: Recurrent seizures     84 yo man with history of epilepsy, prior stroke and afib (not on anticoagulant) presenting with episodes of confusion/finger-tapping. Recent work-up for similar     CT/CTA shows nothing acute  MRI brain  shows nothing acute; multiple chronic infarcts  Keppra level therapeutic at 28.5. Continue PTA dosing  Echocardiogram is noncontributory  Now undergoing VEEG, given recurrent episodes - shows rhythmic activity concerning for underlying seizures. Depakote added 7/10. VPA level pending. Does not have to be back prior to d/c  Electroencephalogram improved, d/c  A1c: 5.8%.  LDL 71. Continue lower dose of rosuvastatin  Continue daily ASA (now 325mg)  Neurochecks and telemetry  Patient would prefer to talk to his OP Cardiologist about any need for anticoagulation going forward, though now the symptoms are seeming less like TIA and more seizure-related  Continued sodium management per ICU/primary team  Neurology will follow sign off. Please call if additional questions arise. I recommend Andrew follow up with his outpatient neurologist within the next few weeks if possible.     Neurology Discharge Planning:  Ok from neurology standpoint as long as he continues to be improving    Patient Active Problem List    Diagnosis Date Noted    Spell of behavior change 07/07/2024     Priority: Medium    Paroxysmal atrial fibrillation (H) 06/14/2021     Priority: Medium    Partial symptomatic epilepsy with complex partial seizures, not intractable, without status epilepticus (H) 10/23/2020     Priority: Medium    Third degree heart block (H) 07/02/2020     Priority: Medium    Syncope, unspecified syncope type 07/16/2018     Priority: Medium    Murmur, cardiac 04/03/2017     Priority: Medium    Bilateral carotid artery disease (H24)      Priority: Medium     Created by Conversion        Hypertension      Priority: Medium     Created by  "Conversion  Replacement Utility updated for latest IMO load         Medical History  No past medical history on file.     SUBJECTIVE     No acute events overnight, intermittent word-finding difficulties.    Andrew's wife is at bedside. He himself feels that his speech is back to \"normal.\" She says he still stumbles over his words intermittently.     OBJECTIVE     Vital signs in last 24 hours  Temp:  [97.4  F (36.3  C)-98.1  F (36.7  C)] 97.7  F (36.5  C)  Pulse:  [66-97] 79  Resp:  [12-28] 13  BP: (115-170)/(59-99) 129/66  SpO2:  [89 %-96 %] 92 %    Weight:   [unfilled]    Review of Systems   Pertinent items are noted in HPI.    General Physical Exam: Patient is alert and oriented x 3. Vital signs were reviewed and are documented in EMR.   Neurological Exam:  Speech is fluent on exam     DIAGNOSTIC STUDIES     Pertinent Radiology   Radiology Results: Personally reviewed image/s    EEG  Impression: This is an abnormal EEG due to diffusely slow background in theta and delta range that is maximally expressed in the left hemisphere.  Early part of the tracing shows frequent left-sided sharp discharges that at times have rhythmic character suggesting EEG seizures.  However, later part of the tracing is diffusely slow more so on the left side but no sharp discharges or rhythmic activity and appears improved.  Slowing can be seen in toxic metabolic abnormalities, clinical correlation is recommended     Classification: Dysrhythmia grade III left (EEG seizure)                            Dysrhythmia grade II generalized maximum left                            Delta grade I generalized maximum left     Pertinent Labs   Lab Results: personally reviewed.   Recent Results (from the past 24 hour(s))   Glucose by meter    Collection Time: 07/10/24 11:43 PM   Result Value Ref Range    GLUCOSE BY METER POCT 115 (H) 70 - 99 mg/dL   Sodium    Collection Time: 07/11/24  4:45 AM   Result Value Ref Range    Sodium 134 (L) 135 - 145 " mmol/L   CBC with platelets    Collection Time: 07/11/24  4:45 AM   Result Value Ref Range    WBC Count 8.3 4.0 - 11.0 10e3/uL    RBC Count 4.12 (L) 4.40 - 5.90 10e6/uL    Hemoglobin 12.9 (L) 13.3 - 17.7 g/dL    Hematocrit 38.0 (L) 40.0 - 53.0 %    MCV 92 78 - 100 fL    MCH 31.3 26.5 - 33.0 pg    MCHC 33.9 31.5 - 36.5 g/dL    RDW 13.3 10.0 - 15.0 %    Platelet Count 165 150 - 450 10e3/uL         HOSPITAL MEDICATIONS     Current Facility-Administered Medications   Medication Dose Route Frequency Provider Last Rate Last Admin    aspirin (ASA) tablet 325 mg  325 mg Oral Daily Favio Hill MD   325 mg at 07/10/24 0959    divalproex sodium delayed-release (DEPAKOTE) DR tablet 500 mg  500 mg Oral Q12H [facility] (08/20) Sera Harris MD   500 mg at 07/10/24 2013    levETIRAcetam (KEPPRA) tablet 1,000 mg  1,000 mg Oral BID Favio Hill MD   1,000 mg at 07/10/24 2013    [Held by provider] metoprolol succinate ER (TOPROL XL) 24 hr tablet 50 mg  50 mg Oral Daily Kimberly Rios MD        rosuvastatin (CRESTOR) tablet 10 mg  10 mg Oral Daily Favio Hill MD   10 mg at 07/10/24 0959    sodium chloride (PF) 0.9% PF flush 3 mL  3 mL Intracatheter Q8H Favio Hill MD   3 mL at 07/11/24 0504        Total time spent for face to face visit, reviewing labs/imaging studies, counseling and coordination of care was: 1 Hour. More than 50% of this time was spent on counseling and coordination of care.    Dragon software used in the dictation on this note.    Sera Chau MD  [facility] [address], [address]  [place] [address]

## 2024-07-11 NOTE — PROGRESS NOTES
[facility]    Medicine Progress Note - Hospitalist Service    Date of Admission:  7/7/2024    Assessment & Plan       83-year-old with known history of seizure disorder, prior stroke A-fib not on anticoagulation who presented with episodes of confusion and finger tapping.  MRI brain is negative for acute stroke.  Keppra level is therapeutic.  Echocardiogram is normal contributory.    Rapid response on 7/9 for word finding difficulty.  MRI of the brain repeated which was negative for stroke.  Patient transferred to ICU for continuous EEG monitoring.  Ordered as needed Ativan for seizures. Depakote started 7/10.      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  -- No evidence of hypothyroidism.  Given normal blood pressure, low suspicion for adrenal insufficiency  TSH   Date Value Ref Range Status   07/08/2024 2.28 0.30 - 4.20 uIU/mL Final         Previous stroke  --Continue aspirin     Seizure disorder  --Likely contributing to his word finding difficulty episodes which are recurrent  --Transferred to ICU for continuous EEG monitoring  --Currently on Keppra, depakote started 7/10  -- Neuro following            Diet: Combination Diet Low Saturated Fat Na <2400mg Diet  Fluid restriction 1500 ML FLUID    DVT Prophylaxis: Low Risk/Ambulatory with no VTE prophylaxis indicated  Gaviria Catheter: Not present  Lines: None     Cardiac Monitoring: None  Code Status: Full Code      Clinically Significant Risk Factors                  # Hypertension: Noted on problem list              # Overweight: Estimated body mass index is 28.7 kg/m  as calculated from the following:    Height as of  "this encounter: 1.676 m (5' 6\").    Weight as of this encounter: 80.6 kg (177 lb 12.8 oz)., PRESENT ON ADMISSION       # Financial/Environmental Concerns: none         Disposition Plan     Medically Ready for Discharge: Anticipated in 2-4 Days             Ellis Franco MD  Hospitalist Service  [facility]  Securely message with [facility] (more info)  Text page via [employer] Paging/Directory   ______________________________________________________________________      Physical Exam   Vital Signs: Temp: 97.8  F (36.6  C) Temp src: Oral BP: 99/63 Pulse: 93   Resp: 23 SpO2: 92 % O2 Device: None (Room air)    Weight: 177 lbs 12.8 oz    Patient was confused and word finding difficulties  Able to follow simple directions  Power is 5 out of 5 in all 4 limbs  No facial droop  Normal S1 S2  Lungs clear  No visible rash    Medical Decision Making       55 MINUTES SPENT BY ME on the date of service doing chart review, history, exam, documentation & further activities per the note.  MANAGEMENT DISCUSSED with the following over the past 24 hours: Patient's wife, neurologist Dr. Gallagher   NOTE(S)/MEDICAL RECORDS REVIEWED over the past 24 hours: Nursing       Data     I have personally reviewed the following data over the past 24 hrs:    8.3  \   12.9 (L)   / 165     134 (L) N/A N/A /  152 (H)   N/A N/A N/A \       Imaging results reviewed over the past 24 hrs:   No results found for this or any previous visit (from the past 24 hour(s)).    "

## 2024-07-11 NOTE — PROGRESS NOTES
Care Management Follow Up    Length of Stay (days): 4    Expected Discharge Date: 07/15/2024      PT Recommendations:  eval and recs pending  OT Recommendations: eval and recs pending      Barriers to Discharge: medical stability, neuro status, diagnostic work up     Prior Living Situation: Pt lives in a house with his wife Chapincito. He is largely independent with ADLs and IADLs. No longer driving, Chapincito drives. Has a . No other equipment or services. Goal is to discharge home, Chapincito to transport home.       Patient/Spokesperson Updated: patient and spouse     Additional Information:  Medical:  Patient with known history of seizure disorder, prior stroke A-fib not on anticoagulation who presented with episodes of confusion and finger tapping.  MRI brain is negative for acute stroke.  Keppra level is therapeutic.  Echocardiogram is normal contributory.    Rapid response on 7/9 for word finding difficulty.  MRI of the brain repeated which was negative for stroke.  Patient transferred to ICU for continuous EEG monitoring.  Ordered as needed Ativan for seizures.  Neurology following.        7/11/24:  Patient is not medically stable to initiate discharge planning. Brief visit with patient and spouse. Discussed discharge options for AR, TCU, home with home care.  All options pending progression.         Gwendolyn Burch, ANTONIA

## 2024-07-11 NOTE — PLAN OF CARE
[facility] - ICU    RN Progress Note:            Pertinent Assessments:      Please refer to flowsheet rows for full assessment     A/O. Pleasant. Speech improved today over yesterday.            Key Events - This Shift:       ***        ALICE SAT (Sedation Awakening Trial): For use ONLY if intubated    SAT Safety Screen {PASSED/FAILED/NA:259050}   If FAILED why?    SAT Performed {PASSED/FAILED/NA:021035}   If FAILED why?               Barriers to Discharge / Downgrade:     ***         Point of Contact Update YES-OR-NO: Yes  If No, reason: ***  Name:***  Phone Number:***  Summary of Conversation: ***       Rosana Meehan RN on 7/11/2024 at 1:53 PM

## 2024-07-11 NOTE — PROGRESS NOTES
07/11/24 1400   Appointment Info   Signing Clinician's Name / Credentials (PT) OKSANA Ellsworth   Living Environment   People in Home spouse   Current Living Arrangements house   Home Accessibility stairs to enter home   Number of Stairs, Main Entrance   (unknown)   Stair Railings, Main Entrance railings safe and in good condition   Self-Care   Usual Activity Tolerance good   Current Activity Tolerance good   Equipment Currently Used at Home cane, straight;walker, rolling   Fall history within last six months no   Activity/Exercise/Self-Care Comment Ind with all ADLs/IADLs. does not use AD at baseline with ambulation   General Information   Onset of Illness/Injury or Date of Surgery 07/07/24   Referring Physician Favio Hill MD   Patient/Family Therapy Goals Statement (PT) None stated   Pertinent History of Current Problem (include personal factors and/or comorbidities that impact the POC) 83-year-old with known history of seizure disorder, prior stroke A-fib not on anticoagulation who presented with episodes of confusion and finger tapping.  MRI brain is negative for acute stroke.  Keppra level is therapeutic.  Echocardiogram is normal contributory.    Rapid response on 7/9 for word finding difficulty.  MRI of the brain repeated which was negative for stroke.  Patient transferred to ICU for continuous EEG monitoring.  Ordered as needed Ativan for seizures. Depakote started 7/10.   Existing Precautions/Restrictions fall   Cognition   Cognitive Status Comments A&Ox4   Pain Assessment   Patient Currently in Pain No   Posture    Posture Forward head position;Protracted shoulders   Strength (Manual Muscle Testing)   Strength (Manual Muscle Testing) No deficits observed during functional mobility   Bed Mobility   Comment, (Bed Mobility) Not assessed, pt seen on commode   Transfers   Transfers sit-stand transfer   Sit-Stand Transfer   Sit-Stand [place] (Transfers) contact guard;1 person assist   Assistive  Device (Sit-Stand Transfers) walker, front-wheeled   Gait/Stairs (Locomotion)   [place] Level (Gait) contact guard;1 person assist   Assistive Device (Gait) walker, front-wheeled   Distance in Feet (Gait) 10'   Pattern (Gait) step-to   Deviations/Abnormal Patterns (Gait) ledy decreased;gait speed decreased   Sensory Examination   Sensory Perception patient reports no sensory changes   Clinical Impression   Criteria for Skilled Therapeutic Intervention Yes, treatment indicated   PT Diagnosis (PT) Impaired mobility, transfers, and ambulation   Influenced by the following impairments Fatigue   Functional limitations due to impairments Home environment navigation   Clinical Presentation (PT Evaluation Complexity) stable   Clinical Presentation Rationale Presents as medically diagnosed   Clinical Decision Making (Complexity) low complexity   Planned Therapy Interventions (PT) balance training;bed mobility training;gait training;home exercise program;patient/family education;strengthening;stair training;transfer training;risk factor education;progressive activity/exercise   Risk & Benefits of therapy have been explained evaluation/treatment results reviewed;care plan/treatment goals reviewed;participants voiced agreement with care plan;participants included;patient;spouse/significant other   PT Total Evaluation Time   PT Eval, Low Complexity Minutes (00188) 10   Physical Therapy Goals   PT Frequency Daily   PT Predicted Duration/Target Date for Goal Attainment 07/18/24   PT Goals Gait;Bed Mobility;Transfers;Stairs   PT: Bed Mobility Supervision/stand-by assist;Supine to/from sit   PT: Transfers Supervision/stand-by assist;Sit to/from stand   PT: Gait Supervision/stand-by assist;Assistive device;Rolling walker;100 feet   Interventions   Interventions Quick Adds Gait Training;Therapeutic Activity   Therapeutic Activity   Therapeutic Activities: dynamic activities to improve functional performance Minutes (60966) 00    Treatment Detail/Skilled Intervention Educated and instructed pt on hand sequencing with sit<>stand from commode/bed to FWW. Max A with luciano--cares at this time. Requires frequent cues for hand placement. Commode>bed transfer with CGA. Pt able to demonstrate proper hand sequencing with stand>sit to recliner chair with cues. Pt in chair with call button within reach.   Gait Training   Gait Training Minutes (69946) 8   Treatment Detail/Skilled Intervention Pt amb w/ FWW and CGA, cues for AD proximity during amb and environment navigation to avoid collision with objects. Pt denies any lightheadedness or dizziness. Instructed pt on AD/self positioning prior to sitting.   Distance in Feet 60'   [place] Level (Gait Training) contact guard   Physical Assistance Level (Gait Training) verbal cues;1 person assist   Assistive Device (Gait Training) rolling walker   Pattern Analysis (Gait Training) swing-to gait   Gait Analysis Deviations decreased step length;decreased ledy   PT Discharge Planning   PT Plan Amb w/ FWW, stairs, LE ex   PT Discharge Recommendation (DC Rec) Transitional Care Facility   PT Rationale for DC Rec Pending progress with ambulation and stairs, pt may be able to d/c home with spouse.   PT Brief overview of current status Amb 60' w/ FWW and CGA, CGA with transfers   PT Equipment Needed at Discharge walker, rolling   Total Session Time   Timed Code Treatment Minutes 18   Total Session Time (sum of timed and untimed services) 28       Direct onsite supervision of therapy and co-signature completed by Tasia Olson, PT on 7/11/2024

## 2024-07-11 NOTE — PLAN OF CARE
[facility] - ICU    RN Progress Note:            Pertinent Assessments:      Please refer to flowsheet rows for full assessment     Vital signs stable, no fever, alert and oriented. Denies pain, intermittent difficult finding words            Key Events - This Shift:       None                                       Barriers to Discharge / Downgrade:     EEG continues

## 2024-07-11 NOTE — PLAN OF CARE
"[facility] - ICU    RN Progress Note:            Pertinent Assessments:      Please refer to flowsheet rows for full assessment     Alertx4, lung sounds diminished, on room air, SpO2 91%+. Pt had 2 BM's. During NIHSS pt says \"yes\" and \"little\" when answering a questions or trying to identify a word.    Telemetry reads 100% V-paced.             Key Events - This Shift:       NIHSS   Time 0800 1200 1600 2000   Score 3 5 3 4                  Barriers to Discharge / Downgrade:     Continuous EEG.          Point of Contact Update YES-OR-NO: Yes  If No, reason:   Name:Chapincito  Phone Number:  Summary of Conversation: Updated throughout the day on plan of care and discussed assessments.          Goal Outcome Evaluation:      Plan of Care Reviewed With: patient, spouse    Overall Patient Progress: no changeOverall Patient Progress: no change    Outcome Evaluation: Continue EEG, continue NIHSS, monitor for acute changes.      Problem: Risk for Delirium  Goal: Improved Attention and Thought Clarity  Outcome: Not Progressing  Intervention: Maximize Cognitive Function  Recent Flowsheet Documentation  Taken 7/10/2024 2000 by Soha Coffey RN  Reorientation Measures: calendar in view  Taken 7/10/2024 1600 by Soha Coffey RN  Reorientation Measures: calendar in view  Taken 7/10/2024 1200 by Soha Coffey RN  Reorientation Measures: calendar in view  Taken 7/10/2024 0800 by Soha Coffey RN  Reorientation Measures: calendar in view     Problem: Comorbidity Management  Goal: Maintenance of Seizure Control  Outcome: Not Progressing  Intervention: Maintain Seizure Symptom Control  Recent Flowsheet Documentation  Taken 7/10/2024 2000 by Soha Coffey RN  Medication Review/Management: medications reviewed  Taken 7/10/2024 1600 by Soha Coffey RN  Medication Review/Management: medications reviewed  Taken 7/10/2024 1200 by Soha Coffey RN  Medication Review/Management: medications " reviewed  Taken 7/10/2024 0800 by Soha Coffey, RN  Medication Review/Management: medications reviewed

## 2024-07-12 ENCOUNTER — APPOINTMENT (OUTPATIENT)
Dept: OCCUPATIONAL THERAPY | Facility: HOSPITAL | Age: 84
DRG: 101 | End: 2024-07-12
Attending: INTERNAL MEDICINE
Payer: COMMERCIAL

## 2024-07-12 ENCOUNTER — APPOINTMENT (OUTPATIENT)
Dept: PHYSICAL THERAPY | Facility: HOSPITAL | Age: 84
DRG: 101 | End: 2024-07-12
Payer: COMMERCIAL

## 2024-07-12 LAB — GLUCOSE BLDC GLUCOMTR-MCNC: 98 MG/DL (ref 70–99)

## 2024-07-12 PROCEDURE — 97116 GAIT TRAINING THERAPY: CPT | Mod: GP

## 2024-07-12 PROCEDURE — 99233 SBSQ HOSP IP/OBS HIGH 50: CPT | Performed by: PSYCHIATRY & NEUROLOGY

## 2024-07-12 PROCEDURE — 97165 OT EVAL LOW COMPLEX 30 MIN: CPT | Mod: GO

## 2024-07-12 PROCEDURE — 99232 SBSQ HOSP IP/OBS MODERATE 35: CPT | Performed by: INTERNAL MEDICINE

## 2024-07-12 PROCEDURE — 97110 THERAPEUTIC EXERCISES: CPT | Mod: GP

## 2024-07-12 PROCEDURE — 250N000013 HC RX MED GY IP 250 OP 250 PS 637: Performed by: INTERNAL MEDICINE

## 2024-07-12 PROCEDURE — 120N000004 HC R&B MS OVERFLOW

## 2024-07-12 PROCEDURE — 97535 SELF CARE MNGMENT TRAINING: CPT | Mod: GO

## 2024-07-12 PROCEDURE — 250N000013 HC RX MED GY IP 250 OP 250 PS 637: Performed by: PSYCHIATRY & NEUROLOGY

## 2024-07-12 PROCEDURE — 97530 THERAPEUTIC ACTIVITIES: CPT | Mod: GO

## 2024-07-12 RX ADMIN — LEVETIRACETAM 1000 MG: 500 TABLET, FILM COATED ORAL at 07:39

## 2024-07-12 RX ADMIN — ASPIRIN 325 MG ORAL TABLET 325 MG: 325 PILL ORAL at 07:38

## 2024-07-12 RX ADMIN — DIVALPROEX SODIUM 500 MG: 500 TABLET, DELAYED RELEASE ORAL at 07:39

## 2024-07-12 RX ADMIN — DIVALPROEX SODIUM 500 MG: 500 TABLET, DELAYED RELEASE ORAL at 19:41

## 2024-07-12 RX ADMIN — LEVETIRACETAM 1000 MG: 500 TABLET, FILM COATED ORAL at 19:40

## 2024-07-12 RX ADMIN — ROSUVASTATIN 10 MG: 10 TABLET, FILM COATED ORAL at 07:38

## 2024-07-12 ASSESSMENT — ACTIVITIES OF DAILY LIVING (ADL)
ADLS_ACUITY_SCORE: 28
ADLS_ACUITY_SCORE: 31
ADLS_ACUITY_SCORE: 28
ADLS_ACUITY_SCORE: 31
ADLS_ACUITY_SCORE: 31
ADLS_ACUITY_SCORE: 27
ADLS_ACUITY_SCORE: 31
ADLS_ACUITY_SCORE: 31
ADLS_ACUITY_SCORE: 27
ADLS_ACUITY_SCORE: 31
ADLS_ACUITY_SCORE: 27
ADLS_ACUITY_SCORE: 27
ADLS_ACUITY_SCORE: 31
ADLS_ACUITY_SCORE: 27
ADLS_ACUITY_SCORE: 31
ADLS_ACUITY_SCORE: 27
ADLS_ACUITY_SCORE: 27
ADLS_ACUITY_SCORE: 31
ADLS_ACUITY_SCORE: 28
ADLS_ACUITY_SCORE: 27

## 2024-07-12 NOTE — PLAN OF CARE
Goal Outcome Evaluation:  [facility] - ICU    RN Progress Note:patient alert and oriented, VSS, neuro checks have been WNL.  Patient has ambulated in hallway with PT, up in chair tolerating good po  Intake.  Complains of no pain.            Pertinent Assessments:      Please refer to flowsheet rows for full assessment     VSS, increase of activity, Neuro WNL           Key Events - This Shift:       None        SJN SAT (Sedation Awakening Trial): For use ONLY if intubated               Barriers to Discharge / Downgrade:     Awaiting to see hospitalist         Point of Contact Update YES-OR-NO: Yes  If No, reason:   Name:[place]ito (wife)  Phone Number:present at bedside  Summary of Conversation: progress of care, and plan

## 2024-07-12 NOTE — PROGRESS NOTES
Occupational Therapy      07/12/24 1050   Appointment Info   Signing Clinician's Name / Credentials (OT) Haley Flores OTR/L   Living Environment   People in Home spouse   Current Living Arrangements house   Home Accessibility stairs to enter home   Stair Railings, Main Entrance railings safe and in good condition   Transportation Anticipated family or friend will provide   Living Environment Comments W/I Shower w/ GB no shower chair   Self-Care   Usual Activity Tolerance good   Current Activity Tolerance good   Regular Exercise No   Equipment Currently Used at Home cane, straight;walker, rolling   Fall history within last six months no   Activity/Exercise/Self-Care Comment Ind. w/ ADL routine at baseline   Instrumental Activities of Daily Living (IADL)   IADL Comments Spouse drives, they have assist w/ cleaning from    General Information   Onset of Illness/Injury or Date of Surgery 07/07/24   Referring Physician Ellis Franco MD   Additional Occupational Profile Info/Pertinent History of Current Problem 83-year-old with known history of seizure disorder, prior stroke A-fib not on anticoagulation who presented with episodes of confusion and finger tapping.  MRI brain is negative for acute stroke.  Keppra level is therapeutic.  Echocardiogram is normal contributory.    Rapid response on 7/9 for word finding difficulty.  MRI of the brain repeated which was negative for stroke.  Patient transferred to ICU for continuous EEG monitoring.  Ordered as needed Ativan for seizures   Existing Precautions/Restrictions fall;seizures   Cognitive Status Examination   Orientation Status person;place   Affect/Mental Status (Cognitive) confused   Safety Deficit awareness of need for assistance;insight into deficits/self-awareness   Bed Mobility   Bed Mobility supine-sit   Supine-Sit [place] (Bed Mobility) minimum assist (75% patient effort)   Transfers   Transfers sit-stand transfer   Sit-Stand Transfer   Sit-Stand  [place] (Transfers) contact guard;verbal cues;supervision   Assistive Device (Sit-Stand Transfers) walker, front-wheeled   Balance   Balance Assessment sit to stand dynamic balance   Sit-to-Stand Balance contact guard;verbal cues;supervision   Activities of Daily Living   BADL Assessment/Intervention lower body dressing   Lower Body Dressing Assessment/Training   [place] Level (Lower Body Dressing) verbal cues;supervision   Clinical Impression   Criteria for Skilled Therapeutic Interventions Met (OT) Yes, treatment indicated   OT Diagnosis decreased ADL ind.   OT Problem List-Impairments impacting ADL problems related to;activity tolerance impaired;balance;mobility;strength   Assessment of Occupational Performance 1-3 Performance Deficits   Planned Therapy Interventions (OT) ADL retraining;balance training;cognition;transfer training;home program guidelines   Clinical Decision Making Complexity (OT) problem focused assessment/low complexity   Risk & Benefits of therapy have been explained evaluation/treatment results reviewed;patient   OT Total Evaluation Time   OT Eval, Low Complexity Minutes (32739) 10   OT Goals   Therapy Frequency (OT) Daily   OT Predicted Duration/Target Date for Goal Attainment 07/19/24   OT Goals Hygiene/Grooming;Transfers;Toilet Transfer/Toileting;Cognition   OT: Hygiene/Grooming modified independent;while standing   OT: Transfer Modified independent;with assistive device   OT: Toilet Transfer/Toileting Modified independent;using adaptive equipment   OT: Cognitive Patient/caregiver will verbalize understanding of cognitive assessment results/recommendations as needed for safe discharge planning   Self-Care/Home Management   Self-Care/Home Mgmt/ADL, Compensatory, Meal Prep Minutes (36815) 10   Treatment Detail/Skilled Intervention Min.A bed mobility w/ cues for tech/safety- ed. on AE such as bed rail for home to aid in more ind. bed mobility. Pt spouse reporting she is unable to  physically assist pt upon d/c. Pt trnfs w/ CGA w/ fww cues for tech no c/o dizziness, pt required freq. cues for sequencing/safety throughout session   Therapeutic Activities   Therapeutic Activity Minutes (86459) 8   Treatment Detail/Skilled Intervention Pt engaged in functional ambulation task w/ CGA w/ fww no LOB but slightly unsteady cues for sequencing/safety.   OT Discharge Planning   OT Plan SLUMS, toileting, dressing   OT Discharge Recommendation (DC Rec) Transitional Care Facility   OT Rationale for DC Rec Pt lives w/ spouse, spouse reports she is unable to physically assist upon d/c- pt may benefit from TCU to improve trnf safety/ADL ind. OT rec. assistx1 for trnfs at this time d/t weakness/decresed safety awareness.   Total Session Time   Timed Code Treatment Minutes 18   Total Session Time (sum of timed and untimed services) 28

## 2024-07-12 NOTE — PLAN OF CARE
[facility] - ICU    RN Progress Note:    Problem: Comorbidity Management  Goal: Maintenance of Seizure Control  Outcome: Progressing     Problem: Adult Inpatient Plan of Care  Goal: Optimal Comfort and Wellbeing  Intervention: Provide Person-Centered Care  Recent Flowsheet Documentation  Taken 7/11/2024 2000 by Lady Cuellar RN  Trust Relationship/Rapport:   care explained   choices provided   emotional support provided   empathic listening provided   questions answered   questions encouraged   reassurance provided   thoughts/feelings acknowledged   Goal Outcome Evaluation:    No seizure activity noted this shift. Patient alert and oriented and has not any any noticeable difficulty with word finding, ect.             Pertinent Assessments:      Please refer to flowsheet rows for full assessment     Patient alert and oriented x4, neuro status intact. NIHSS score = 3. Room air, sats >91% Up with standby assist and walker to bathroom and ambulating in hallways. Voiding per bathroom. BM x1.            Key Events - This Shift:     No overnight events.          Barriers to Discharge / Downgrade:     Possible placement?? TCU vs SNF vs home.         Point of Contact Update YES-OR-NO: No   If No, reason: No acute changes in status  Name:  Phone Number:  Summary of Conversation:

## 2024-07-12 NOTE — PROGRESS NOTES
Care Management Follow Up    Length of Stay (days): 5    Expected Discharge Date: 07/15/2024    Anticipated Discharge Plan:   TBD    Transportation: TBD    PT Recommendations: Transitional Care Facility-Amb 70' with min A and FWW.   OT Recommendations: eval and rec pending      Barriers to Discharge: medical stability, neuro status, diagnostic work up     Prior Living Situation: Pt lives in a house with his wife [place]. He is largely independent with ADLs and IADLs. No longer driving, [place]ito drives. Has a . No other equipment or services. Goal is to discharge home, [place] to transport home.       Patient/Spokesperson Updated: patient and spouse 7/12     Additional Information:  Medical:  Patient with known history of seizure disorder, prior stroke A-fib not on anticoagulation who presented with episodes of confusion and finger tapping.  MRI brain is negative for acute stroke.  Keppra level is therapeutic.  Echocardiogram is normal contributory.    Rapid response on 7/9 for word finding difficulty.  MRI of the brain repeated which was negative for stroke.  Patient transferred to ICU for continuous EEG monitoring.  Ordered as needed Ativan for seizures.  Neurology following.        7/12/24:  Final discharge plan pending; home with home care vs TCU. Provided patient and spouse [facility] TCU list. They are undecided on if home or TCU is best and would like to see how he progresses.  They will review list and will call  [phone]) with preferences.         Gwendolyn Burch RN

## 2024-07-12 NOTE — PROGRESS NOTES
[facility]    Medicine Progress Note - Hospitalist Service    Date of Admission:  7/7/2024    Assessment & Plan       83-year-old with known history of seizure disorder, prior stroke A-fib not on anticoagulation who presented with episodes of confusion and finger tapping.  MRI brain is negative for acute stroke.  Keppra level is therapeutic.  Echocardiogram is normal contributory.    Rapid response on 7/9 for word finding difficulty.  MRI of the brain repeated which was negative for stroke.  Patient transferred to ICU for continuous EEG monitoring.  Ordered as needed Ativan for seizures. Depakote started 7/10.      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  -- No evidence of hypothyroidism.  Given normal blood pressure, low suspicion for adrenal insufficiency  TSH   Date Value Ref Range Status   07/08/2024 2.28 0.30 - 4.20 uIU/mL Final         Previous stroke  --Continue aspirin     Seizure disorder  --Likely contributing to his word finding difficulty episodes which are recurrent  --Transferred to ICU for continuous EEG monitoring  --Currently on Keppra, depakote started 7/10  -- Neuro following            Diet: Combination Diet Low Saturated Fat Na <2400mg Diet  Fluid restriction 1500 ML FLUID    DVT Prophylaxis: Low Risk/Ambulatory with no VTE prophylaxis indicated  Gaviria Catheter: Not present  Lines: None     Cardiac Monitoring: None  Code Status: Full Code      Clinically Significant Risk  "Factors                  # Hypertension: Noted on problem list              # Overweight: Estimated body mass index is 28.29 kg/m  as calculated from the following:    Height as of this encounter: 1.676 m (5' 6\").    Weight as of this encounter: 79.5 kg (175 lb 4.3 oz).        # Financial/Environmental Concerns: none         Disposition Plan     Medically Ready for Discharge: Anticipated in 2-4 Days             Ellis Franco MD  Hospitalist Service  [facility]  Securely message with [employer] (more info)  Text page via [employer] Paging/Directory   ______________________________________________________________________      Physical Exam   Vital Signs: Temp: 98  F (36.7  C) Temp src: Axillary BP: 110/62 Pulse: 91   Resp: 20 SpO2: 97 % O2 Device: None (Room air)    Weight: 175 lbs 4.25 oz    Patient was confused and word finding difficulties  Able to follow simple directions  Power is 5 out of 5 in all 4 limbs  No facial droop  Normal S1 S2  Lungs clear  No visible rash    Medical Decision Making       55 MINUTES SPENT BY ME on the date of service doing chart review, history, exam, documentation & further activities per the note.  MANAGEMENT DISCUSSED with the following over the past 24 hours: Patient's wife, neurologist Dr. Gallagher   NOTE(S)/MEDICAL RECORDS REVIEWED over the past 24 hours: Nursing       Data         Imaging results reviewed over the past 24 hrs:   No results found for this or any previous visit (from the past 24 hour(s)).    "

## 2024-07-13 LAB
GLUCOSE BLDC GLUCOMTR-MCNC: 88 MG/DL (ref 70–99)
VALPROATE FREE MFR SERPL: 27 %
VALPROATE FREE SERPL-MCNC: 13 UG/ML
VALPROATE SERPL-MCNC: 48 UG/ML

## 2024-07-13 PROCEDURE — 99232 SBSQ HOSP IP/OBS MODERATE 35: CPT | Performed by: INTERNAL MEDICINE

## 2024-07-13 PROCEDURE — 250N000013 HC RX MED GY IP 250 OP 250 PS 637: Performed by: INTERNAL MEDICINE

## 2024-07-13 PROCEDURE — 120N000001 HC R&B MED SURG/OB

## 2024-07-13 RX ADMIN — DIVALPROEX SODIUM 500 MG: 500 TABLET, DELAYED RELEASE ORAL at 20:09

## 2024-07-13 RX ADMIN — ASPIRIN 325 MG ORAL TABLET 325 MG: 325 PILL ORAL at 07:36

## 2024-07-13 RX ADMIN — DIVALPROEX SODIUM 500 MG: 500 TABLET, DELAYED RELEASE ORAL at 07:36

## 2024-07-13 RX ADMIN — LEVETIRACETAM 1000 MG: 500 TABLET, FILM COATED ORAL at 07:36

## 2024-07-13 RX ADMIN — ROSUVASTATIN 10 MG: 10 TABLET, FILM COATED ORAL at 07:36

## 2024-07-13 RX ADMIN — LEVETIRACETAM 1000 MG: 500 TABLET, FILM COATED ORAL at 20:09

## 2024-07-13 ASSESSMENT — ACTIVITIES OF DAILY LIVING (ADL)
ADLS_ACUITY_SCORE: 32
ADLS_ACUITY_SCORE: 31
ADLS_ACUITY_SCORE: 32
ADLS_ACUITY_SCORE: 31
ADLS_ACUITY_SCORE: 32
ADLS_ACUITY_SCORE: 31

## 2024-07-13 NOTE — PROGRESS NOTES
Report given to nurseVic on p4.  Patient will transfer to room 418.  Belongings with patient, wife at bedside.  Patient will be transferred via wheelchair.

## 2024-07-13 NOTE — PROGRESS NOTES
[facility]    Medicine Progress Note - Hospitalist Service    Date of Admission:  7/7/2024    Assessment & Plan       83-year-old with known history of seizure disorder, prior stroke A-fib not on anticoagulation who presented with episodes of confusion and finger tapping.  MRI brain is negative for acute stroke.  Keppra level is therapeutic.  Echocardiogram is normal contributory.    Rapid response on 7/9 for word finding difficulty.  MRI of the brain repeated which was negative for stroke.  Patient transferred to ICU for continuous EEG monitoring.  Ordered as needed Ativan for seizures. Depakote started 7/10.      7/11 :     Stable neuro status  Off continuous EEG monitoring now  Continue depakote, keppra      Needs placement once medically ready, neuro clearance      7/12 :     No new issues noted today  Stable neuro status  Off continuous EEG monitoring now  Continue depakote, keppra      Needs placement once medically ready, neuro clearance      7/13 :     No new issues noted today, out of ICU on med floor  Stable neuro status  Off continuous EEG monitoring now  Continue depakote, keppra    Medically ready  Needs placement        A/p :      Hyponatremia mild - resolved  -- Urine sodium greater than 20 and 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  -- No evidence of hypothyroidism.  Given normal blood pressure, low suspicion for adrenal insufficiency  TSH   Date Value Ref Range Status   07/08/2024 2.28 0.30 - 4.20 uIU/mL Final         Previous stroke  --Continue aspirin     Seizure disorder  --Likely contributing to his word finding difficulty episodes which are recurrent  --Transferred to ICU for continuous EEG monitoring  --Currently on Keppra, depakote started 7/10  -- Neuro following            Diet: Combination Diet Low Saturated Fat Na <2400mg Diet  Fluid restriction 1500 ML FLUID    DVT  "Prophylaxis: Low Risk/Ambulatory with no VTE prophylaxis indicated  Gaviria Catheter: Not present  Lines: None     Cardiac Monitoring: None  Code Status: Full Code      Clinically Significant Risk Factors                  # Hypertension: Noted on problem list              # Overweight: Estimated body mass index is 28.46 kg/m  as calculated from the following:    Height as of this encounter: 1.676 m (5' 6\").    Weight as of this encounter: 80 kg (176 lb 4.8 oz).        # Financial/Environmental Concerns: none         Disposition Plan     Medically Ready for Discharge: Anticipated in 2-4 Days             Ellis Franco MD  Hospitalist Service  [facility]  Securely message with [employer] (more info)  Text page via [employer] Paging/Directory   ______________________________________________________________________      Physical Exam   Vital Signs: Temp: 97.9  F (36.6  C) Temp src: Oral BP: 109/58 Pulse: 85   Resp: 18 SpO2: 96 % O2 Device: None (Room air)    Weight: 176 lbs 4.8 oz    Patient was confused and word finding difficulties  Able to follow simple directions  Power is 5 out of 5 in all 4 limbs  No facial droop  Normal S1 S2  Lungs clear  No visible rash    Medical Decision Making       55 MINUTES SPENT BY ME on the date of service doing chart review, history, exam, documentation & further activities per the note.  MANAGEMENT DISCUSSED with the following over the past 24 hours: Patient's wife, neurologist Dr. Gallagher   NOTE(S)/MEDICAL RECORDS REVIEWED over the past 24 hours: Nursing       Data         Imaging results reviewed over the past 24 hrs:   No results found for this or any previous visit (from the past 24 hour(s)).    "

## 2024-07-13 NOTE — PLAN OF CARE
Problem: Comorbidity Management  Goal: Maintenance of Seizure Control  Outcome: Progressing  Intervention: Maintain Seizure Symptom Control  Recent Flowsheet Documentation  Taken 7/13/2024 1606 by Vic Denney RN  Sensory Stimulation Regulation: television on  Medication Review/Management: medications reviewed     Problem: Adult Inpatient Plan of Care  Goal: Optimal Comfort and Wellbeing  Outcome: Adequate for Care Transition     Problem: Risk for Delirium  Goal: Improved Attention and Thought Clarity  Outcome: Adequate for Care Transition  Intervention: Maximize Cognitive Function  Recent Flowsheet Documentation  Taken 7/13/2024 1606 by Vic Denney RN  Sensory Stimulation Regulation: television on  Reorientation Measures: clock in view     Problem: Fall Injury Risk  Goal: Absence of Fall and Fall-Related Injury  Outcome: Adequate for Care Transition  Intervention: Identify and Manage Contributors  Recent Flowsheet Documentation  Taken 7/13/2024 1606 by Vic Denney RN  Medication Review/Management: medications reviewed  Intervention: Promote Injury-Free Environment  Recent Flowsheet Documentation  Taken 7/13/2024 1606 by Vic Denney RN  Safety Promotion/Fall Prevention:   assistive device/personal items within reach   clutter free environment maintained   nonskid shoes/slippers when out of bed   room door open   safety round/check completed   supervised activity     Problem: Adult Inpatient Plan of Care  Goal: Absence of Hospital-Acquired Illness or Injury  Intervention: Identify and Manage Fall Risk  Recent Flowsheet Documentation  Taken 7/13/2024 1606 by Vic Denney RN  Safety Promotion/Fall Prevention:   assistive device/personal items within reach   clutter free environment maintained   nonskid shoes/slippers when out of bed   room door open   safety round/check completed   supervised activity  Intervention: Prevent Skin Injury  Recent Flowsheet Documentation  Taken 7/13/2024 1321 by  Vic Denney RN  Body Position: (Patient walked and then sat back into chair.) other (see comments)  Intervention: Prevent and Manage VTE (Venous Thromboembolism) Risk  Recent Flowsheet Documentation  Taken 7/13/2024 1606 by Vic Denney RN  VTE Prevention/Management: SCDs off (sequential compression devices)  Intervention: Prevent Infection  Recent Flowsheet Documentation  Taken 7/13/2024 1606 by Vic Denney RN  Infection Prevention:   hand hygiene promoted   rest/sleep promoted     Problem: Risk for Delirium  Goal: Improved Behavioral Control  Intervention: Minimize Safety Risk  Recent Flowsheet Documentation  Taken 7/13/2024 1606 by Vic Denney RN  Communication Enhancement Strategies: call light answered in person  Enhanced Safety Measures: review medications for side effects with activity   Goal Outcome Evaluation:       Patient is A/O x4. VSS. Patient denies pain. He has walked long distances in the [place] with SBA with gait belt and walker. Patient able to make needs known to staff. He has been continent of bowel and bladder.

## 2024-07-13 NOTE — PLAN OF CARE
Alert and orientedx4. Vital signs stable. Room air. 02 Sats >97%. Denies of pain. NIH score of 0. AO1 to get up with walker. Medical surgical status now. Will monitor closely.    Problem: Comorbidity Management  Goal: Maintenance of Seizure Control  Outcome: Progressing  Intervention: Maintain Seizure Symptom Control    Problem: Fall Injury Risk  Goal: Absence of Fall and Fall-Related Injury  Outcome: Progressing  Intervention: Identify and Manage Contributors  Intervention: Promote Injury-Free Environment    Problem: Risk for Delirium  Goal: Optimal Coping  Intervention: Optimize Psychosocial Adjustment to Delirium  Goal: Improved Behavioral Control  Intervention: Minimize Safety Risk  Goal: Improved Attention and Thought Clarity  Intervention: Maximize Cognitive Function  Goal: Improved Sleep  Outcome: Progressing

## 2024-07-13 NOTE — PLAN OF CARE
[facility] - ICU    RN Progress Note:            Pertinent Assessments:      Please refer to flowsheet rows for full assessment     Vital signs stable no fever, denies pain, alert and oriented .           Key Events - This Shift:       Uneventful                        Barriers to Discharge / Downgrade:     None

## 2024-07-14 ENCOUNTER — APPOINTMENT (OUTPATIENT)
Dept: PHYSICAL THERAPY | Facility: HOSPITAL | Age: 84
DRG: 101 | End: 2024-07-14
Payer: COMMERCIAL

## 2024-07-14 PROCEDURE — 97116 GAIT TRAINING THERAPY: CPT | Mod: GP

## 2024-07-14 PROCEDURE — 120N000001 HC R&B MED SURG/OB

## 2024-07-14 PROCEDURE — 250N000013 HC RX MED GY IP 250 OP 250 PS 637: Performed by: INTERNAL MEDICINE

## 2024-07-14 PROCEDURE — 97110 THERAPEUTIC EXERCISES: CPT | Mod: GP

## 2024-07-14 PROCEDURE — 99232 SBSQ HOSP IP/OBS MODERATE 35: CPT | Performed by: INTERNAL MEDICINE

## 2024-07-14 RX ADMIN — ROSUVASTATIN 10 MG: 10 TABLET, FILM COATED ORAL at 08:37

## 2024-07-14 RX ADMIN — LEVETIRACETAM 1000 MG: 500 TABLET, FILM COATED ORAL at 08:38

## 2024-07-14 RX ADMIN — ASPIRIN 325 MG ORAL TABLET 325 MG: 325 PILL ORAL at 08:37

## 2024-07-14 RX ADMIN — DIVALPROEX SODIUM 500 MG: 500 TABLET, DELAYED RELEASE ORAL at 20:40

## 2024-07-14 RX ADMIN — DIVALPROEX SODIUM 500 MG: 500 TABLET, DELAYED RELEASE ORAL at 08:38

## 2024-07-14 RX ADMIN — LEVETIRACETAM 1000 MG: 500 TABLET, FILM COATED ORAL at 20:39

## 2024-07-14 ASSESSMENT — ACTIVITIES OF DAILY LIVING (ADL)
ADLS_ACUITY_SCORE: 32
ADLS_ACUITY_SCORE: 28
ADLS_ACUITY_SCORE: 32
ADLS_ACUITY_SCORE: 31
ADLS_ACUITY_SCORE: 32
ADLS_ACUITY_SCORE: 32
ADLS_ACUITY_SCORE: 28
ADLS_ACUITY_SCORE: 32
ADLS_ACUITY_SCORE: 31
ADLS_ACUITY_SCORE: 31
ADLS_ACUITY_SCORE: 28
ADLS_ACUITY_SCORE: 31
ADLS_ACUITY_SCORE: 31
ADLS_ACUITY_SCORE: 32
ADLS_ACUITY_SCORE: 31
ADLS_ACUITY_SCORE: 32
ADLS_ACUITY_SCORE: 32
ADLS_ACUITY_SCORE: 31
ADLS_ACUITY_SCORE: 28
ADLS_ACUITY_SCORE: 31
ADLS_ACUITY_SCORE: 32

## 2024-07-14 NOTE — PLAN OF CARE
"  Problem: Adult Inpatient Plan of Care  Goal: Plan of Care Review  Description: The Plan of Care Review/Shift note should be completed every shift.  The Outcome Evaluation is a brief statement about your assessment that the patient is improving, declining, or no change.  This information will be displayed automatically on your shift  note.  Outcome: Progressing  Goal: Patient-Specific Goal (Individualized)  Description: You can add care plan individualizations to a care plan. Examples of Individualization might be:  \"Parent requests to be called daily at 9am for status\", \"I have a hard time hearing out of my right ear\", or \"Do not touch me to wake me up as it startles  me\".  Outcome: Progressing  Goal: Absence of Hospital-Acquired Illness or Injury  Outcome: Progressing  Intervention: Identify and Manage Fall Risk  Recent Flowsheet Documentation  Taken 7/14/2024 0017 by Jennifer Anderson RN  Safety Promotion/Fall Prevention:   assistive device/personal items within reach   clutter free environment maintained   nonskid shoes/slippers when out of bed   room door open   safety round/check completed   supervised activity  Intervention: Prevent Infection  Recent Flowsheet Documentation  Taken 7/14/2024 0017 by Jennifer Anderson RN  Infection Prevention:   personal protective equipment utilized   rest/sleep promoted  Goal: Readiness for Transition of Care  Outcome: Progressing     Problem: Risk for Delirium  Goal: Optimal Coping  Outcome: Progressing  Intervention: Optimize Psychosocial Adjustment to Delirium  Recent Flowsheet Documentation  Taken 7/14/2024 0017 by Jennifer Anderson, RN  Supportive Measures:   active listening utilized   goal-setting facilitated   positive reinforcement provided  Goal: Improved Sleep  Outcome: Progressing     Problem: Comorbidity Management  Goal: Maintenance of Seizure Control  Outcome: Progressing  Intervention: Maintain Seizure Symptom Control  Recent Flowsheet Documentation  Taken 7/14/2024 " 0017 by Jennifer Anderson, RN  Sensory Stimulation Regulation: television on  Seizure Precautions: clutter-free environment maintained  Medication Review/Management: medications reviewed   Goal Outcome Evaluation:  Pt has been sleeping well overnight, awake with cares. Pt denied pain. Pt is alert and able to make his needs known.

## 2024-07-14 NOTE — PROGRESS NOTES
Care Management Follow Up    Length of Stay (days): 7    Expected Discharge Date: 07/15/2024     Concerns to be Addressed:  discharge planning   Patient plan of care discussed at interdisciplinary rounds: Yes    Anticipated Discharge Disposition:  TCU     Anticipated Discharge Services:  none  Anticipated Discharge DME:  none    Patient/family educated on Medicare website which has current facility and service quality ratings:  yes  Education Provided on the Discharge Plan: yes   Patient/Family in Agreement with the Plan:  yes    Referrals Placed by CM/SW:    Private pay costs discussed: private room/amenity fees    Additional Information:  Bedside nurse informed SW regarding Pt's and Pt's family's TCU choices: [facility], [facility], and [facility]. Per nurse, Pt is agreeable to private room costs. Referrals sent, currently pending.     JANET Ryan

## 2024-07-14 NOTE — PLAN OF CARE
"  Problem: Adult Inpatient Plan of Care  Goal: Plan of Care Review  Description: The Plan of Care Review/Shift note should be completed every shift.  The Outcome Evaluation is a brief statement about your assessment that the patient is improving, declining, or no change.  This information will be displayed automatically on your shift  note.  Outcome: Progressing  Flowsheets (Taken 7/14/2024 1136)  Plan of Care Reviewed With:   patient   spouse  Goal: Patient-Specific Goal (Individualized)  Description: You can add care plan individualizations to a care plan. Examples of Individualization might be:  \"Parent requests to be called daily at 9am for status\", \"I have a hard time hearing out of my right ear\", or \"Do not touch me to wake me up as it startles  me\".  Outcome: Progressing  Goal: Absence of Hospital-Acquired Illness or Injury  Outcome: Progressing  Intervention: Prevent Skin Injury  Recent Flowsheet Documentation  Taken 7/14/2024 1114 by Haja Almonte, RN  Body Position:   heels elevated   supine  Goal: Readiness for Transition of Care  Outcome: Progressing     Problem: Comorbidity Management  Goal: Maintenance of Seizure Control  Outcome: Progressing  Intervention: Maintain Seizure Symptom Control  Recent Flowsheet Documentation  Taken 7/14/2024 0840 by Haja Almonte, RN  Medication Review/Management: medications reviewed   Goal Outcome Evaluation:      Plan of Care Reviewed With: patient, spouse                 "

## 2024-07-14 NOTE — PROGRESS NOTES
Pt A&O. VSS in RA. Denies pain. Ambulates to recliner to bed with SBA walker 7 gaitbelt.     Klaudia Sauceda RN

## 2024-07-14 NOTE — PLAN OF CARE
Problem: Adult Inpatient Plan of Care  Goal: Readiness for Transition of Care  Outcome: Progressing     Problem: Comorbidity Management  Goal: Maintenance of Seizure Control  7/13/2024 2001 by Vic Denney RN  Outcome: Progressing  7/13/2024 1700 by Vic Denney RN  Outcome: Progressing  Intervention: Maintain Seizure Symptom Control  Recent Flowsheet Documentation  Taken 7/13/2024 1606 by Vic Denney RN  Sensory Stimulation Regulation: television on  Medication Review/Management: medications reviewed     Problem: Adult Inpatient Plan of Care  Goal: Optimal Comfort and Wellbeing  Outcome: Adequate for Care Transition     Problem: Risk for Delirium  Goal: Improved Behavioral Control  Outcome: Adequate for Care Transition  Intervention: Minimize Safety Risk  Recent Flowsheet Documentation  Taken 7/13/2024 1606 by Vic Denney RN  Communication Enhancement Strategies: call light answered in person  Enhanced Safety Measures: review medications for side effects with activity  Goal: Improved Attention and Thought Clarity  Outcome: Adequate for Care Transition  Intervention: Maximize Cognitive Function  Recent Flowsheet Documentation  Taken 7/13/2024 1606 by Vic Denney RN  Sensory Stimulation Regulation: television on  Reorientation Measures: clock in view     Problem: Fall Injury Risk  Goal: Absence of Fall and Fall-Related Injury  Outcome: Adequate for Care Transition  Intervention: Identify and Manage Contributors  Recent Flowsheet Documentation  Taken 7/13/2024 1606 by Vic Denney RN  Medication Review/Management: medications reviewed  Intervention: Promote Injury-Free Environment  Recent Flowsheet Documentation  Taken 7/13/2024 1606 by Vic Denney RN  Safety Promotion/Fall Prevention:   assistive device/personal items within reach   clutter free environment maintained   nonskid shoes/slippers when out of bed   room door open   safety round/check completed   supervised activity      Problem: Adult Inpatient Plan of Care  Goal: Absence of Hospital-Acquired Illness or Injury  Intervention: Identify and Manage Fall Risk  Recent Flowsheet Documentation  Taken 7/13/2024 1606 by Vic Denney RN  Safety Promotion/Fall Prevention:   assistive device/personal items within reach   clutter free environment maintained   nonskid shoes/slippers when out of bed   room door open   safety round/check completed   supervised activity  Intervention: Prevent Skin Injury  Recent Flowsheet Documentation  Taken 7/13/2024 1321 by iVc Denney RN  Body Position: (Patient walked and then sat back into chair.) other (see comments)  Intervention: Prevent and Manage VTE (Venous Thromboembolism) Risk  Recent Flowsheet Documentation  Taken 7/13/2024 1606 by Vic Denney RN  VTE Prevention/Management: SCDs off (sequential compression devices)  Intervention: Prevent Infection  Recent Flowsheet Documentation  Taken 7/13/2024 1606 by Vic Denney RN  Infection Prevention:   hand hygiene promoted   rest/sleep promoted   Goal Outcome Evaluation:  Patient is A/O x4. VSS. He denies pain. Appetite great. He ambulates frequently through hallway with SBA, walker and gait belt.

## 2024-07-14 NOTE — PROGRESS NOTES
[facility]    Medicine Progress Note - Hospitalist Service    Date of Admission:  7/7/2024    Assessment & Plan       83-year-old with known history of seizure disorder, prior stroke A-fib not on anticoagulation who presented with episodes of confusion and finger tapping.  MRI brain is negative for acute stroke.  Keppra level is therapeutic.  Echocardiogram is normal contributory.    Rapid response on 7/9 for word finding difficulty.  MRI of the brain repeated which was negative for stroke.  Patient transferred to ICU for continuous EEG monitoring.  Ordered as needed Ativan for seizures. Depakote started 7/10.      7/11 :     Stable neuro status  Off continuous EEG monitoring now  Continue depakote, keppra      Needs placement once medically ready, neuro clearance      7/12 :     No new issues noted today  Stable neuro status  Off continuous EEG monitoring now  Continue depakote, keppra      Needs placement once medically ready, neuro clearance      7/13 :     No new issues noted today, out of ICU on med floor  Stable neuro status  Off continuous EEG monitoring now  Continue depakote, keppra    Medically ready  Needs placement      7/14 :     No new issues noted today  Stable neuro status  Continue depakote, keppra    Medically ready  Needs placement        A/p :      Hyponatremia mild - resolved  -- Urine sodium greater than 20 and 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  -- No evidence of hypothyroidism.  Given normal blood pressure, low suspicion for adrenal insufficiency  TSH   Date Value Ref Range Status   07/08/2024 2.28 0.30 - 4.20 uIU/mL Final         Previous stroke  --Continue aspirin     Seizure disorder  --Likely contributing to his word finding difficulty episodes which are recurrent  --Transferred to ICU for continuous EEG monitoring  --Currently on Keppra, depakote started 7/10  --  "Neuro following            Diet: Combination Diet Low Saturated Fat Na <2400mg Diet  Fluid restriction 1500 ML FLUID    DVT Prophylaxis: Low Risk/Ambulatory with no VTE prophylaxis indicated  Gaviria Catheter: Not present  Lines: None     Cardiac Monitoring: None  Code Status: Full Code      Clinically Significant Risk Factors                  # Hypertension: Noted on problem list              # Overweight: Estimated body mass index is 28.46 kg/m  as calculated from the following:    Height as of this encounter: 1.676 m (5' 6\").    Weight as of this encounter: 80 kg (176 lb 4.8 oz).        # Financial/Environmental Concerns: none         Disposition Plan     Medically Ready for Discharge: Anticipated in 2-4 Days             Ellis Franco MD  Hospitalist Service  [facility]  Securely message with [employer] (more info)  Text page via [employer] Paging/Directory   ______________________________________________________________________      Physical Exam   Vital Signs: Temp: 97.8  F (36.6  C) Temp src: Oral BP: (!) 140/65 Pulse: 89   Resp: 18 SpO2: 96 % O2 Device: None (Room air)    Weight: 176 lbs 4.8 oz    Patient was confused and word finding difficulties  Able to follow simple directions  Power is 5 out of 5 in all 4 limbs  No facial droop  Normal S1 S2  Lungs clear  No visible rash    Medical Decision Making       55 MINUTES SPENT BY ME on the date of service doing chart review, history, exam, documentation & further activities per the note.  MANAGEMENT DISCUSSED with the following over the past 24 hours: Patient's wife, neurologist Dr. Gallagher   NOTE(S)/MEDICAL RECORDS REVIEWED over the past 24 hours: Nursing       Data         Imaging results reviewed over the past 24 hrs:   No results found for this or any previous visit (from the past 24 hour(s)).    "

## 2024-07-15 ENCOUNTER — APPOINTMENT (OUTPATIENT)
Dept: OCCUPATIONAL THERAPY | Facility: HOSPITAL | Age: 84
DRG: 101 | End: 2024-07-15
Payer: COMMERCIAL

## 2024-07-15 ENCOUNTER — APPOINTMENT (OUTPATIENT)
Dept: PHYSICAL THERAPY | Facility: HOSPITAL | Age: 84
DRG: 101 | End: 2024-07-15
Payer: COMMERCIAL

## 2024-07-15 PROCEDURE — 99232 SBSQ HOSP IP/OBS MODERATE 35: CPT | Performed by: INTERNAL MEDICINE

## 2024-07-15 PROCEDURE — 97110 THERAPEUTIC EXERCISES: CPT | Mod: GO

## 2024-07-15 PROCEDURE — 120N000001 HC R&B MED SURG/OB

## 2024-07-15 PROCEDURE — 97116 GAIT TRAINING THERAPY: CPT | Mod: GP

## 2024-07-15 PROCEDURE — 97110 THERAPEUTIC EXERCISES: CPT | Mod: GP

## 2024-07-15 PROCEDURE — 250N000013 HC RX MED GY IP 250 OP 250 PS 637: Performed by: INTERNAL MEDICINE

## 2024-07-15 RX ORDER — HYDROXYZINE HYDROCHLORIDE 25 MG/1
25 TABLET, FILM COATED ORAL
Status: DISCONTINUED | OUTPATIENT
Start: 2024-07-15 | End: 2024-07-16 | Stop reason: HOSPADM

## 2024-07-15 RX ORDER — HYDROXYZINE HYDROCHLORIDE 25 MG/1
50 TABLET, FILM COATED ORAL
Status: DISCONTINUED | OUTPATIENT
Start: 2024-07-15 | End: 2024-07-16 | Stop reason: HOSPADM

## 2024-07-15 RX ADMIN — ROSUVASTATIN 10 MG: 10 TABLET, FILM COATED ORAL at 08:58

## 2024-07-15 RX ADMIN — LEVETIRACETAM 1000 MG: 500 TABLET, FILM COATED ORAL at 20:21

## 2024-07-15 RX ADMIN — LEVETIRACETAM 1000 MG: 500 TABLET, FILM COATED ORAL at 08:58

## 2024-07-15 RX ADMIN — ASPIRIN 325 MG ORAL TABLET 325 MG: 325 PILL ORAL at 08:58

## 2024-07-15 RX ADMIN — DIVALPROEX SODIUM 500 MG: 500 TABLET, DELAYED RELEASE ORAL at 20:20

## 2024-07-15 RX ADMIN — DIVALPROEX SODIUM 500 MG: 500 TABLET, DELAYED RELEASE ORAL at 08:58

## 2024-07-15 ASSESSMENT — ACTIVITIES OF DAILY LIVING (ADL)
ADLS_ACUITY_SCORE: 25
ADLS_ACUITY_SCORE: 26
ADLS_ACUITY_SCORE: 25
ADLS_ACUITY_SCORE: 26
ADLS_ACUITY_SCORE: 25
ADLS_ACUITY_SCORE: 26
ADLS_ACUITY_SCORE: 25
ADLS_ACUITY_SCORE: 25
ADLS_ACUITY_SCORE: 26
ADLS_ACUITY_SCORE: 25

## 2024-07-15 NOTE — PLAN OF CARE
"  Problem: Adult Inpatient Plan of Care  Goal: Plan of Care Review  Description: The Plan of Care Review/Shift note should be completed every shift.  The Outcome Evaluation is a brief statement about your assessment that the patient is improving, declining, or no change.  This information will be displayed automatically on your shift  note.  Outcome: Progressing  Goal: Patient-Specific Goal (Individualized)  Description: You can add care plan individualizations to a care plan. Examples of Individualization might be:  \"Parent requests to be called daily at 9am for status\", \"I have a hard time hearing out of my right ear\", or \"Do not touch me to wake me up as it startles  me\".  Outcome: Progressing  Goal: Absence of Hospital-Acquired Illness or Injury  Outcome: Progressing  Intervention: Identify and Manage Fall Risk  Recent Flowsheet Documentation  Taken 7/15/2024 0018 by Jennifer Anderson RN  Safety Promotion/Fall Prevention:   assistive device/personal items within reach   clutter free environment maintained   nonskid shoes/slippers when out of bed   room door open   safety round/check completed   supervised activity  Intervention: Prevent Skin Injury  Recent Flowsheet Documentation  Taken 7/15/2024 0018 by Jennifer Anderson RN  Body Position: supine, legs elevated  Intervention: Prevent Infection  Recent Flowsheet Documentation  Taken 7/15/2024 0018 by Jennifer Anderson RN  Infection Prevention:   personal protective equipment utilized   rest/sleep promoted  Goal: Readiness for Transition of Care  Outcome: Progressing     Problem: Comorbidity Management  Goal: Maintenance of Seizure Control  Outcome: Progressing  Intervention: Maintain Seizure Symptom Control  Recent Flowsheet Documentation  Taken 7/15/2024 0018 by Jennifer Anderson RN  Sensory Stimulation Regulation: television on  Seizure Precautions: clutter-free environment maintained  Medication Review/Management: medications reviewed     Problem: Mobility " Impairment  Goal: Optimal Mobility  Outcome: Progressing  Intervention: Optimize Mobility  Recent Flowsheet Documentation  Taken 7/15/2024 0018 by Jennifer Anderson RN  Assistive Device Utilized:   gait belt   walker  Activity Management:   activity adjusted per tolerance   ambulated to bathroom   back to bed  Positioning/Transfer Devices:   in use   pillows   Goal Outcome Evaluation:  Pt has been sleeping well overnight, awake with cares. Pt is alert and able to make his needs known. Pt denied pain.

## 2024-07-15 NOTE — PLAN OF CARE
"  Problem: Adult Inpatient Plan of Care  Goal: Plan of Care Review  Description: The Plan of Care Review/Shift note should be completed every shift.  The Outcome Evaluation is a brief statement about your assessment that the patient is improving, declining, or no change.  This information will be displayed automatically on your shift  note.  Outcome: Progressing  Flowsheets (Taken 7/15/2024 1707)  Plan of Care Reviewed With:   patient   spouse  Goal: Patient-Specific Goal (Individualized)  Description: You can add care plan individualizations to a care plan. Examples of Individualization might be:  \"Parent requests to be called daily at 9am for status\", \"I have a hard time hearing out of my right ear\", or \"Do not touch me to wake me up as it startles  me\".  Outcome: Progressing  Goal: Absence of Hospital-Acquired Illness or Injury  Outcome: Progressing  Intervention: Identify and Manage Fall Risk  Recent Flowsheet Documentation  Taken 7/15/2024 1600 by Haja Almonte RN  Safety Promotion/Fall Prevention: activity supervised  Intervention: Prevent Skin Injury  Recent Flowsheet Documentation  Taken 7/15/2024 1600 by Haja Almonte RN  Body Position:   heels elevated   legs elevated   log-rolled  Taken 7/15/2024 1517 by Haja Almonte RN  Body Position:   heels elevated   supine  Goal: Readiness for Transition of Care  Outcome: Progressing     Problem: Comorbidity Management  Goal: Maintenance of Seizure Control  Outcome: Progressing  Intervention: Maintain Seizure Symptom Control  Recent Flowsheet Documentation  Taken 7/15/2024 1600 by Haja Almonte RN  Sensory Stimulation Regulation:   care clustered   lighting decreased   television on   quiet environment promoted     Problem: Mobility Impairment  Goal: Optimal Mobility  Outcome: Progressing  Intervention: Optimize Mobility  Recent Flowsheet Documentation  Taken 7/15/2024 1600 by Haja Almonte RN  Assistive Device Utilized: walker  Activity Management: activity " adjusted per tolerance  Positioning/Transfer Devices:   pillows   in use  Taken 7/15/2024 1517 by Haja Almonte, RN  Positioning/Transfer Devices:   in use   pillows   Goal Outcome Evaluation:      Plan of Care Reviewed With: patient, spouse

## 2024-07-15 NOTE — PLAN OF CARE
"  Problem: Adult Inpatient Plan of Care  Goal: Plan of Care Review  Description: The Plan of Care Review/Shift note should be completed every shift.  The Outcome Evaluation is a brief statement about your assessment that the patient is improving, declining, or no change.  This information will be displayed automatically on your shift  note.  Outcome: Progressing     Problem: Adult Inpatient Plan of Care  Goal: Patient-Specific Goal (Individualized)  Description: You can add care plan individualizations to a care plan. Examples of Individualization might be:  \"Parent requests to be called daily at 9am for status\", \"I have a hard time hearing out of my right ear\", or \"Do not touch me to wake me up as it startles  me\".  Outcome: Progressing     Problem: Adult Inpatient Plan of Care  Goal: Absence of Hospital-Acquired Illness or Injury  Intervention: Identify and Manage Fall Risk  Recent Flowsheet Documentation  Taken 7/15/2024 0800 by Luis Mora, RN  Safety Promotion/Fall Prevention:   activity supervised   assistive device/personal items within reach   Goal Outcome Evaluation:       No verbal or nonverbal expressions of pain, able to express needs, ambulation in hallway SBA, family at bedside.                 "

## 2024-07-15 NOTE — CONSULTS
SPIRITUAL HEALTH SERVICES - Consult Note  [facility]    Referral Source: Routine Consult/LOS    Pt. Byron and his wife and brother-in-law were all present. Byron said that he is Buddhist, he is doing well, and that he has been visited by both the  and the Eucharistic  during his hospitalization. I let him know how to request SHS if needed.      Plan:   SHS available per requests/as needed.        Dora Godfrey Mdiv. '25   Intern    SHS available 24/7 for emergent requests/referrals, either by paging the on-call  or by entering an ASAP/STAT consult in Epic (this will also page the on-call ).

## 2024-07-15 NOTE — PROGRESS NOTES
[facility]    Medicine Progress Note - Hospitalist Service    Date of Admission:  7/7/2024    Assessment & Plan       83-year-old with known history of seizure disorder, prior stroke A-fib not on anticoagulation who presented with episodes of confusion and finger tapping.  MRI brain is negative for acute stroke.  Keppra level is therapeutic.  Echocardiogram is normal contributory.    Rapid response on 7/9 for word finding difficulty.  MRI of the brain repeated which was negative for stroke.  Patient transferred to ICU for continuous EEG monitoring.  Ordered as needed Ativan for seizures. Depakote started 7/10.      7/11 :     Stable neuro status  Off continuous EEG monitoring now  Continue depakote, keppra      Needs placement once medically ready, neuro clearance      7/12 :     No new issues noted today  Stable neuro status  Off continuous EEG monitoring now  Continue depakote, keppra      Needs placement once medically ready, neuro clearance      7/13 :     No new issues noted today, out of ICU on med floor  Stable neuro status  Off continuous EEG monitoring now  Continue depakote, keppra    Medically ready  Needs placement      7/15 :     No new issues noted today  Stable neuro status  Continue depakote, keppra    Medically ready  Needs placement        A/p :      Hyponatremia mild - resolved  -- Urine sodium greater than 20 and 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  -- No evidence of hypothyroidism.  Given normal blood pressure, low suspicion for adrenal insufficiency  TSH   Date Value Ref Range Status   07/08/2024 2.28 0.30 - 4.20 uIU/mL Final         Previous stroke  --Continue aspirin     Seizure disorder  --Likely contributing to his word finding difficulty episodes which are recurrent  --Transferred to ICU for continuous EEG monitoring  --Currently on Keppra, depakote started 7/10  --  "Neuro following            Diet: Combination Diet Low Saturated Fat Na <2400mg Diet  Fluid restriction 1500 ML FLUID    DVT Prophylaxis: Low Risk/Ambulatory with no VTE prophylaxis indicated  Gaviria Catheter: Not present  Lines: None     Cardiac Monitoring: None  Code Status: Full Code      Clinically Significant Risk Factors                  # Hypertension: Noted on problem list              # Overweight: Estimated body mass index is 28.46 kg/m  as calculated from the following:    Height as of this encounter: 1.676 m (5' 6\").    Weight as of this encounter: 80 kg (176 lb 4.8 oz).        # Financial/Environmental Concerns: none         Disposition Plan     Medically Ready for Discharge: Anticipated in 2-4 Days             Ellis Franco MD  Hospitalist Service  [facility]  Securely message with [employer] (more info)  Text page via [employer] Paging/Directory   ______________________________________________________________________      Physical Exam   Vital Signs: Temp: 97.9  F (36.6  C) Temp src: Oral BP: 119/62 Pulse: 87   Resp: 18 SpO2: 97 % O2 Device: None (Room air)    Weight: 176 lbs 4.8 oz    Patient was confused and word finding difficulties  Able to follow simple directions  Power is 5 out of 5 in all 4 limbs  No facial droop  Normal S1 S2  Lungs clear  No visible rash    Medical Decision Making       55 MINUTES SPENT BY ME on the date of service doing chart review, history, exam, documentation & further activities per the note.  MANAGEMENT DISCUSSED with the following over the past 24 hours: Patient's wife, neurologist Dr. Gallagher   NOTE(S)/MEDICAL RECORDS REVIEWED over the past 24 hours: Nursing       Data         Imaging results reviewed over the past 24 hrs:   No results found for this or any previous visit (from the past 24 hour(s)).    "

## 2024-07-15 NOTE — PROGRESS NOTES
"Care Management Follow Up    Length of Stay (days): 8    Expected Discharge Date: 07/15/2024    Anticipated Discharge Plan:   TCU     Transportation: Anticipate Family/friend    PT Recommendations: Transitional Care Facility  OT Recommendations:  Transitional Care Facility     Barriers to Discharge: placement/ pt now medically ready       Prior Living Situation: \" Pt lives in a house with his wife [place]. He is largely independent with ADLs and IADLs. No longer driving, [place] drives. Has a . No other equipment or services. Spouse willing to transport if patient mobile. TCU referrals sent, pending.\"       Patient/Spokesperson Updated: Yes. Who? Pt and pt's wife     Additional Information:  Chart reviewed.     Cm updates:  Pt medically ready, neuro signed off.       RNCM called [facility], awaiting call back .  RNCM called [facility], they can accept him Weds.       RNCM spoke to pt's wife and pt they are going to look at more TCU choices, but were agreeable to sending referral to [facility], referral placed (pending).       CM will continue to follow plan of care,review recommendations, and assist with any discharge needs anticipated.       Mayda Segundo RN      "

## 2024-07-15 NOTE — PROGRESS NOTES
CLINICAL NUTRITION SERVICES    Reviewed nutrition risk factors due to LOS. Pt is tolerating diet, eating well per nursing documentation. No nutrition issues identified at this time. RD will follow via rounds at this time, unless consulted. Pt consuming 100% meals. Averaged 1532 Calories and 64 g protein ( 7/12 and 7/13).  Met ~ 90% Calories needs and 94% protein needs ( 1710 + Calories/day and 68 g protein/day)

## 2024-07-15 NOTE — PLAN OF CARE
Problem: Comorbidity Management  Goal: Maintenance of Seizure Control  7/14/2024 2154 by Greyson Oliveira, RN  Outcome: Progressing  7/14/2024 2154 by Greyson Oliveira, RN  Outcome: Progressing  Intervention: Maintain Seizure Symptom Control  Recent Flowsheet Documentation  Taken 7/14/2024 1708 by Greyson Oliveira, RN  Sensory Stimulation Regulation: television on     Problem: Mobility Impairment  Goal: Optimal Mobility  Outcome: Progressing   Goal Outcome Evaluation:       Patient A and O x4. Standby assist of 1 when ambulating. Ambulated in hallway with staff and family.Tele discontinued on previous shift. Patient had 1 medium sized MB this shift. No pain reported.

## 2024-07-16 ENCOUNTER — APPOINTMENT (OUTPATIENT)
Dept: OCCUPATIONAL THERAPY | Facility: HOSPITAL | Age: 84
DRG: 101 | End: 2024-07-16
Payer: COMMERCIAL

## 2024-07-16 ENCOUNTER — APPOINTMENT (OUTPATIENT)
Dept: PHYSICAL THERAPY | Facility: HOSPITAL | Age: 84
DRG: 101 | End: 2024-07-16
Payer: COMMERCIAL

## 2024-07-16 ENCOUNTER — LAB REQUISITION (OUTPATIENT)
Dept: LAB | Facility: CLINIC | Age: 84
End: 2024-07-16
Payer: COMMERCIAL

## 2024-07-16 VITALS
HEART RATE: 97 BPM | BODY MASS INDEX: 28.33 KG/M2 | RESPIRATION RATE: 18 BRPM | HEIGHT: 66 IN | DIASTOLIC BLOOD PRESSURE: 65 MMHG | TEMPERATURE: 98.1 F | SYSTOLIC BLOOD PRESSURE: 126 MMHG | OXYGEN SATURATION: 93 % | WEIGHT: 176.3 LBS

## 2024-07-16 DIAGNOSIS — Z11.1 ENCOUNTER FOR SCREENING FOR RESPIRATORY TUBERCULOSIS: ICD-10-CM

## 2024-07-16 PROCEDURE — 250N000013 HC RX MED GY IP 250 OP 250 PS 637: Performed by: INTERNAL MEDICINE

## 2024-07-16 PROCEDURE — 97535 SELF CARE MNGMENT TRAINING: CPT | Mod: GO

## 2024-07-16 PROCEDURE — 97110 THERAPEUTIC EXERCISES: CPT | Mod: GP

## 2024-07-16 PROCEDURE — 97530 THERAPEUTIC ACTIVITIES: CPT | Mod: GP

## 2024-07-16 PROCEDURE — 97116 GAIT TRAINING THERAPY: CPT | Mod: GP

## 2024-07-16 PROCEDURE — 99239 HOSP IP/OBS DSCHRG MGMT >30: CPT | Performed by: INTERNAL MEDICINE

## 2024-07-16 RX ORDER — DIVALPROEX SODIUM 500 MG/1
500 TABLET, DELAYED RELEASE ORAL EVERY 12 HOURS
Qty: 5 TABLET | Refills: 0 | Status: SHIPPED | OUTPATIENT
Start: 2024-07-16 | End: 2024-09-12

## 2024-07-16 RX ORDER — HYDROXYZINE HYDROCHLORIDE 25 MG/1
25 TABLET, FILM COATED ORAL
Qty: 5 TABLET | Refills: 0 | Status: SHIPPED | OUTPATIENT
Start: 2024-07-16 | End: 2024-09-12

## 2024-07-16 RX ADMIN — LEVETIRACETAM 1000 MG: 500 TABLET, FILM COATED ORAL at 08:11

## 2024-07-16 RX ADMIN — ASPIRIN 325 MG ORAL TABLET 325 MG: 325 PILL ORAL at 08:11

## 2024-07-16 RX ADMIN — DIVALPROEX SODIUM 500 MG: 500 TABLET, DELAYED RELEASE ORAL at 08:11

## 2024-07-16 RX ADMIN — ROSUVASTATIN 10 MG: 10 TABLET, FILM COATED ORAL at 08:11

## 2024-07-16 ASSESSMENT — ACTIVITIES OF DAILY LIVING (ADL)
ADLS_ACUITY_SCORE: 25

## 2024-07-16 NOTE — PLAN OF CARE
Occupational Therapy Discharge Summary    Reason for therapy discharge:    Discharged to transitional care facility.    Progress towards therapy goal(s). See goals on Care Plan in [facility] electronic health record for goal details.  Goals partially met.  Barriers to achieving goals:   discharge from facility.    Therapy recommendation(s):    Continued therapy is recommended.  Rationale/Recommendations:  ADL/IADL training and determine safety for return home with wife..

## 2024-07-16 NOTE — PROGRESS NOTES
Physical Therapy Discharge Summary    Reason for therapy discharge:    Discharged to transitional care facility.    Progress towards therapy goal(s). See goals on Care Plan in [facility] electronic health record for goal details.  Goals not met.  Barriers to achieving goals:   discharge from facility.    Therapy recommendation(s):    Continued therapy is recommended.  Rationale/Recommendations:  TCU.

## 2024-07-16 NOTE — PROGRESS NOTES
Care Management Discharge Note    Discharge Date: 07/16/2024       Discharge Disposition: Transitional Care    Discharge Services:  TCU     Discharge DME:  None     Discharge Transportation: family or friend will provide    Private pay costs discussed:None     Does the patient's insurance plan have a 3 day qualifying hospital stay waiver?  No    PAS Confirmation Code: [id number]  Patient/family educated on Medicare website which has current facility and service quality ratings:  Yes    Education Provided on the Discharge Plan:  Per Care Team   Persons Notified of Discharge Plans: Yes  Patient/Family in Agreement with the Plan: yes    Handoff Referral Completed: Yes    Additional Information:    Final discharge plan is for pt to go to [facility] today 7/16.  Wife to transport around 12:00-12:30pm. Pt to have lunch prior to leaving . Bedside, provider, pt, pt's wife, and facility notified.         Mayda Segundo RN

## 2024-07-16 NOTE — PLAN OF CARE
Problem: Adult Inpatient Plan of Care  Goal: Plan of Care Review  Description: The Plan of Care Review/Shift note should be completed every shift.  The Outcome Evaluation is a brief statement about your assessment that the patient is improving, declining, or no change.  This information will be displayed automatically on your shift  note.  Outcome: Met     Problem: Adult Inpatient Plan of Care  Goal: Absence of Hospital-Acquired Illness or Injury  Outcome: Met     Problem: Adult Inpatient Plan of Care  Goal: Absence of Hospital-Acquired Illness or Injury  Intervention: Prevent Skin Injury  Recent Flowsheet Documentation  Taken 7/16/2024 0800 by Luis Mora, RN  Body Position: position changed independently     Problem: Adult Inpatient Plan of Care  Goal: Readiness for Transition of Care  Outcome: Met   Goal Outcome Evaluation:  No verbal or nonverbal expressions of pain, able to verbalize needs, alert oriented x 4, VS stable, will discharge at 1230, spouse will transport.

## 2024-07-16 NOTE — PLAN OF CARE
Alert and oriendtedx4. Vital signs stable. Room air. 02 Sats >94%/ denies of pain. A01 to get up with walker. Able to got some sleep tonight. Will monitor closely.    Problem: Comorbidity Management  Goal: Maintenance of Seizure Control  Outcome: Progressing  Intervention: Maintain Seizure Symptom Control    Problem: Mobility Impairment  Goal: Optimal Mobility  Outcome: Progressing

## 2024-07-17 PROCEDURE — 86481 TB AG RESPONSE T-CELL SUSP: CPT | Mod: ORL | Performed by: FAMILY MEDICINE

## 2024-07-17 PROCEDURE — P9604 ONE-WAY ALLOW PRORATED TRIP: HCPCS | Mod: ORL | Performed by: FAMILY MEDICINE

## 2024-07-17 PROCEDURE — 36415 COLL VENOUS BLD VENIPUNCTURE: CPT | Mod: ORL | Performed by: FAMILY MEDICINE

## 2024-07-18 LAB
GAMMA INTERFERON BACKGROUND BLD IA-ACNC: 0 IU/ML
M TB IFN-G BLD-IMP: NEGATIVE
M TB IFN-G CD4+ BCKGRND COR BLD-ACNC: 2.43 IU/ML
MITOGEN IGNF BCKGRD COR BLD-ACNC: 0.2 IU/ML
MITOGEN IGNF BCKGRD COR BLD-ACNC: 0.28 IU/ML
QUANTIFERON MITOGEN: 2.43 IU/ML
QUANTIFERON NIL TUBE: 0 IU/ML
QUANTIFERON TB1 TUBE: 0.28 IU/ML
QUANTIFERON TB2 TUBE: 0.2

## 2024-07-27 ENCOUNTER — HOSPITAL ENCOUNTER (EMERGENCY)
Facility: HOSPITAL | Age: 84
Discharge: HOME | End: 2024-07-27
Attending: EMERGENCY MEDICINE | Admitting: EMERGENCY MEDICINE
Payer: COMMERCIAL

## 2024-07-27 ENCOUNTER — APPOINTMENT (OUTPATIENT)
Dept: CT IMAGING | Facility: HOSPITAL | Age: 84
End: 2024-07-27
Attending: EMERGENCY MEDICINE
Payer: COMMERCIAL

## 2024-07-27 VITALS
HEART RATE: 63 BPM | RESPIRATION RATE: 20 BRPM | OXYGEN SATURATION: 94 % | BODY MASS INDEX: 28.93 KG/M2 | TEMPERATURE: 97.9 F | HEIGHT: 66 IN | SYSTOLIC BLOOD PRESSURE: 136 MMHG | WEIGHT: 180 LBS | DIASTOLIC BLOOD PRESSURE: 63 MMHG

## 2024-07-27 DIAGNOSIS — S09.90XA CLOSED HEAD INJURY, INITIAL ENCOUNTER: ICD-10-CM

## 2024-07-27 PROCEDURE — 70450 CT HEAD/BRAIN W/O DYE: CPT

## 2024-07-27 PROCEDURE — 99284 EMERGENCY DEPT VISIT MOD MDM: CPT | Mod: 25

## 2024-07-27 PROCEDURE — 72125 CT NECK SPINE W/O DYE: CPT

## 2024-07-27 ASSESSMENT — COLUMBIA-SUICIDE SEVERITY RATING SCALE - C-SSRS
6. HAVE YOU EVER DONE ANYTHING, STARTED TO DO ANYTHING, OR PREPARED TO DO ANYTHING TO END YOUR LIFE?: NO
1. IN THE PAST MONTH, HAVE YOU WISHED YOU WERE DEAD OR WISHED YOU COULD GO TO SLEEP AND NOT WAKE UP?: NO
2. HAVE YOU ACTUALLY HAD ANY THOUGHTS OF KILLING YOURSELF IN THE PAST MONTH?: NO

## 2024-07-27 ASSESSMENT — ACTIVITIES OF DAILY LIVING (ADL)
ADLS_ACUITY_SCORE: 38

## 2024-07-27 NOTE — ED NOTES
Bed: [facility]-18  Expected date:   Expected time:   Means of arrival:   Comments:  WBL 83 male fall hit head neck pain possible thinners

## 2024-07-27 NOTE — ED PROVIDER NOTES
EMERGENCY DEPARTMENT ENCOUNTER      NAME: Andrew Haines  AGE: 83 year old male  YOB: 1940  MRN: [id number]  EVALUATION DATE & TIME: 2024  2:02 PM    PCP: No Ref-Primary, Physician    ED PROVIDER: Altaf Morocho D.O.      Chief Complaint   Patient presents with    Fall       FINAL IMPRESSION:  1. Closed head injury, initial encounter        ED COURSE & MEDICAL DECISION MAKIN:09 PM I met with the patient to gather history and to perform my initial exam. I discussed the plan for care while in the Emergency Department.  2:32 PM Re-examined patient and patient has small skin tear of scrotum. Not actively bleeding and it appears old.  3:40 PM Rechecked and updated patient on imaging results. Discussed plan for discharge.         Pertinent Labs & Imaging studies reviewed. (See chart for details)  83 year old male presents to the Emergency Department for evaluation of to the emergency department status post mechanical fall.  Patient did have a small mount of swelling to the posterior scalp.  Discontinued use of Xarelto 2 weeks ago.  Initial concern was for intracranial bleed, skull fracture, C-spine fracture, dislocation, or other acute process.  Fortunately CT imaging of the head and C-spine do not show any evidence of intercranial bleeding, fractures, or dislocation.  Does show small area of hematoma on posterior scalp consistent with his area of swelling.  At this time I do not believe lab testing or further imaging is indicated believe this patient is appropriate for discharge home with outpatient follow-up with primary care provider.  Patient was comfortable this plan.  Return precautions were discussed.    Medical Decision Making  Obtained supplemental history:Supplemental history obtained?: Documented in chart and EMS  Reviewed external records: External records reviewed?: Documented in chart  Care impacted by chronic illness:Other: Paroxysmal atrial fibrillation, HTN, cerebral  ventriculomegaly, MR, OA, and bilateral carotid artery disease  Care significantly affected by social determinants of health:Access to Affordable Health Care  Did you consider but not order tests?: Work up considered but not performed and documented in chart, if applicable  Did you interpret images independently?: Independent interpretation of ECG and images noted in documentation, when applicable.  Consultation discussion with other provider:Did you involve another provider (consultant, MH, pharmacy, etc.)?: No  Discharge. I prescribed additional prescription strength medication(s) as charted. See documentation for any additional details.    At the conclusion of the encounter I discussed the results of all of the tests and the disposition. The questions were answered. The patient or family acknowledged understanding and was agreeable with the care plan.      HPI    Patient information was obtained from: Patient, EMS    Use of : N/A       Andrew Haines is a 83 year old male with PMHx of paroxysmal atrial fibrillation, HTN, cerebral ventriculomegaly, previous CVA, MR, OA, and bilateral carotid artery disease, who presents following a fall witnessed by patient's wife.    Per EMS, patient comes from home and recently was discharged from the [facility]. He has been off Xarelto for at least 2 weeks now. He was walking up the stairs, lost his balance, and fell to the ground. No LOC. She denies feeling lightheaded or dizzy prior to fall. He has a goose egg at the back of his head. He denies any pain-related complaints or discomfort. The fall was witnessed by his wife. He denies feeling any numbness or tingling to his hands or feet. No other reported complaints or concerns at this time.      PAST MEDICAL HISTORY:  No past medical history on file.    PAST SURGICAL HISTORY:  No past surgical history on file.      CURRENT MEDICATIONS:    No current facility-administered medications for this encounter.     Current  "Outpatient Medications   Medication Sig Dispense Refill    aspirin (ASA) 325 MG tablet Take 1 tablet by mouth daily      Calcium Carb-Cholecalciferol (CALCIUM-VITAMIN D3) 600-400 MG-UNIT CAPS Take 2 tablets by mouth daily      divalproex sodium delayed-release (DEPAKOTE) 500 MG DR tablet Take 1 tablet (500 mg) by mouth every 12 hours 5 tablet 0    hydrOXYzine HCl (ATARAX) 25 MG tablet Take 1 tablet (25 mg) by mouth nightly as needed for other or itching (adjuvant pain) 5 tablet 0    levETIRAcetam (KEPPRA) 1000 MG tablet Take 1,000 mg by mouth 2 times daily      metoprolol succinate ER (TOPROL-XL) 50 MG 24 hr tablet Take 50 mg by mouth daily      omeprazole (PRILOSEC) 20 MG DR capsule Take 20 mg by mouth daily as needed (heartburn)      rosuvastatin (CRESTOR) 10 MG tablet Take 10 mg by mouth daily           ALLERGIES:  Allergies   Allergen Reactions    Penicillins Rash     Denies throat swelling or breathing difficulty  Denies throat swelling or breathing difficulty      Pollen Extract Cough       FAMILY HISTORY:  Family History   Problem Relation Age of Onset    Cerebrovascular Disease Father        SOCIAL HISTORY:  Social History     Socioeconomic History    Marital status:        VITALS:  Patient Vitals for the past 24 hrs:   BP Temp Temp src Pulse Resp SpO2 Height Weight   07/27/24 1547 136/63 -- -- 63 -- 94 % -- --   07/27/24 1532 122/59 -- -- 63 -- 97 % -- --   07/27/24 1517 127/61 -- -- 62 -- 91 % -- --   07/27/24 1512 133/63 -- -- 65 -- 95 % -- --   07/27/24 1444 129/60 -- -- 65 -- 92 % -- --   07/27/24 1430 (!) 147/70 -- -- 68 -- 92 % -- --   07/27/24 1425 (!) 148/58 -- -- 70 -- 93 % -- --   07/27/24 1414 (!) 155/70 -- -- 69 -- 95 % -- --   07/27/24 1408 (!) 180/79 97.9  F (36.6  C) Oral 68 20 95 % 1.676 m (5' 6\") 81.6 kg (180 lb)       PHYSICAL EXAM    VITAL SIGNS: /63   Pulse 63   Temp 97.9  F (36.6  C) (Oral)   Resp 20   Ht 1.676 m (5' 6\")   Wt 81.6 kg (180 lb)   SpO2 94%   BMI 29.05 " kg/m      General Appearance: Well-appearing, well-nourished, no acute distress   Head:  Normocephalic, without obvious abnormality, atraumatic. Mild swelling to the posterior scalp.  Eyes:  PERRL, conjunctiva/corneas clear, EOM's intact,  ENT:  Lips, mucosa, and tongue normal, membranes are moist without pallor  Neck:  Normal ROM, symmetrical, trachea midline    Cardio:  Regular rate and rhythm, no murmur, rub or gallop, 2+ pulses symmetric in all extremities  Pulm:  Clear to auscultation bilaterally, respirations unlabored,  : Small skin tear of scrotum. Not actively bleeding and it appears old.  Musculoskeletal: Full ROM, no edema, no cyanosis, good ROM of major joints  Integument:  Warm, Dry, No erythema, No rash.    Neurologic:  Alert & oriented.  No focal deficits appreciated.    Psychiatric:  Affect normal, Judgment normal, Mood normal.      LABS  Results for orders placed or performed during the hospital encounter of 07/27/24 (from the past 24 hour(s))   Head CT w/o contrast    Narrative    EXAM: CT HEAD W/O CONTRAST  LOCATION: [facility]  DATE: 7/27/2024    INDICATION: Head injury  COMPARISON: CT angiogram head and neck 7/7/2024.  TECHNIQUE: Routine CT Head without IV contrast. Multiplanar reformats. Dose reduction techniques were used.    FINDINGS:  INTRACRANIAL CONTENTS: No acute intracranial hemorrhage. No extra-axial fluid collection. No mass effect or midline shift. Moderate-sized chronic infarct in the left cerebellar hemisphere and small chronic infarct in the right cerebellar hemisphere. Mild   to moderate presumed chronic small vessel ischemic changes. Mild to moderate generalized volume loss. No hydrocephalus.     VISUALIZED ORBITS/SINUSES/MASTOIDS: Prior bilateral cataract surgery. Visualized portions of the orbits are otherwise unremarkable. Minimal mucosal thickening in the maxillary sinuses. No middle ear or mastoid effusion.    BONES/SOFT TISSUES: Parietal scalp  hematoma. No acute calvarial abnormality.      Impression    IMPRESSION:  1.  No CT evidence for acute intracranial process.  2.  Right parietal scalp hematoma.  3.  Moderate zone of chronic ischemic change in the posterior left cerebellar hemisphere and small chronic infarct in the right cerebellar hemisphere.  4.  Brain atrophy and presumed chronic microvascular ischemic changes as above.   CT Cervical Spine w/o Contrast    Narrative    EXAM: CT CERVICAL SPINE W/O CONTRAST  LOCATION: [facility]  DATE: 7/27/2024    INDICATION: Neck pain.  COMPARISON: None.  TECHNIQUE: Routine CT Cervical Spine without IV contrast. Multiplanar reformats. Dose reduction techniques were used.    FINDINGS:  VERTEBRA: Straightening of usual cervical spine lordosis. Cervical vertebral body heights are maintained. Grade 1 anterolisthesis C2 on C3 and C3 on C4. There is partial fusion across C2-C3 and C3-C4 disc spaces. Ankylosis of the bilateral C2-C3 and   C3-C4 facet joints. There is partial fusion across the left C4-C5 facet joint. The cervical spinal canal is narrowed on a developmental basis. No acute cervical spine fracture.     CANAL/FORAMINA: Mild bilateral neural foraminal stenosis at C2-C3. Moderate right neural foraminal stenosis at C3-C4. Severe bilateral neural foraminal stenosis at C4-C5 and C5-C6. Moderate bilateral neural foraminal stenosis at C6-C7. Severe right and   moderate left neural foraminal stenosis at C7-T1. Severe bilateral neural foraminal stenosis at T1-T2. Mild spinal canal stenosis at C5-C6.    PARASPINAL: No extraspinal abnormality.      Impression    IMPRESSION:  1.  No acute cervical spine fracture.         RADIOLOGY  CT Cervical Spine w/o Contrast   Final Result   IMPRESSION:   1.  No acute cervical spine fracture.      Head CT w/o contrast   Final Result   IMPRESSION:   1.  No CT evidence for acute intracranial process.   2.  Right parietal scalp hematoma.   3.  Moderate zone of  chronic ischemic change in the posterior left cerebellar hemisphere and small chronic infarct in the right cerebellar hemisphere.   4.  Brain atrophy and presumed chronic microvascular ischemic changes as above.           I have independently interpreted the above image,. See radiology report for detail.    EKG:    N/A    PROCEDURES:  N/A        MEDICATIONS GIVEN IN THE EMERGENCY:  Medications - No data to display    NEW PRESCRIPTIONS STARTED AT TODAY'S ER VISIT  Discharge Medication List as of 7/27/2024  3:52 PM           I, Nathalie Ryan, am serving as a scribe to document services personally performed by Altaf Morocho D.O., based on my observations and the provider's statements to me.  I, Altaf Morocho D.O., attest that Nathalie Ryan is acting in a scribe capacity, has observed my performance of the services and has documented them in accordance with my direction.     Altaf Morocho D.O.  Emergency Medicine  [facility] EMERGENCY DEPARTMENT  [address] [place] [id number]  [phone]  Dept: [phone]       Altaf Morocho,   07/27/24 6423

## 2024-07-27 NOTE — ED TRIAGE NOTES
Pt coming from home via EMS. Pt states he tripped and fell while walking up some stairs. Pt did hit head, but remembers whole event. Fall witnessed by wife who confirms no LOC. Pt did feel slightly dizzy when standing up with EMS. On arrival Pt has some hematoma on back of head. Pt alert and appropriate.      Triage Assessment (Adult)       Row Name 07/27/24 1410          Triage Assessment    Airway WDL WDL        Respiratory WDL    Respiratory WDL WDL        Skin Circulation/Temperature WDL    Skin Circulation/Temperature WDL WDL        Cardiac WDL    Cardiac WDL WDL        Peripheral/Neurovascular WDL    Peripheral Neurovascular WDL WDL;neurovascular assessment upper;neurovascular assessment lower        Cognitive/Neuro/Behavioral WDL    Cognitive/Neuro/Behavioral WDL WDL        LUE Neurovascular Assessment    Temperature LUE warm     Color LUE no discoloration     Sensation LUE no tenderness;no tingling;no numbness        RUE Neurovascular Assessment    Temperature RUE warm     Color RUE no discoloration     Sensation RUE no numbness;no tenderness;no tingling        LLE Neurovascular Assessment    Temperature LLE warm     Color LLE no discoloration     Sensation LLE no numbness;no tenderness;no tingling        RLE Neurovascular Assessment    Temperature RLE warm     Color RLE no discoloration     Sensation RLE no tenderness;no tingling;no numbness

## 2024-07-27 NOTE — ED NOTES
Pt assisted to a commode to have a BM. When getting back into bed RN noted a gaping would on Pt's scrotum. Alerted Dr. Morocho to this who visited Pt's bedside. Pt denies pain in this area and area is not swollen with no drainage or discharge.

## 2024-08-16 NOTE — DISCHARGE SUMMARY
[facility]  Hospitalist Discharge Summary      Date of Admission:  7/7/2024  Date of Discharge:  7/16/2024 12:55 PM  Discharging Provider: Ellis Franco MD  Discharge Service: Hospitalist Service    Discharge Diagnoses         83-year-old with known history of seizure disorder, prior stroke A-fib not on anticoagulation who presented with episodes of confusion and finger tapping.  MRI brain is negative for acute stroke.  Keppra level is therapeutic.  Echocardiogram is normal contributory.    Rapid response on 7/9 for word finding difficulty.  MRI of the brain repeated which was negative for stroke.  Patient transferred to ICU for continuous EEG monitoring.  Ordered as needed Ativan for seizures. Depakote started 7/10.                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  -- No evidence of hypothyroidism.  Given normal blood pressure, low suspicion for adrenal insufficiency        TSH   Date Value Ref Range Status   07/08/2024 2.28 0.30 - 4.20 uIU/mL Final            Previous stroke  --Continue aspirin      Seizure disorder  --Likely contributing to his word finding difficulty episodes which are recurrent  --Transferred to ICU for continuous EEG monitoring  --Currently on Keppra, depakote started 7/10  -- Neuro following  CT/CTA shows nothing acute  MRI brain  shows nothing acute; multiple chronic infarcts  Keppra level therapeutic at 28.5. Continue PTA dosing  Echocardiogram is noncontributory  VEEG, given recurrent episodes - shows rhythmic activity concerning for underlying seizures. Depakote added 7/10.   VPA level pending. Does not have to be back prior to d/c  Electroencephalogram improved, d/c  A1c: 5.8%.  LDL 71. Continue lower dose of  "rosuvastatin  Continue daily ASA (now 325mg)  Neurochecks and telemetry  Patient would prefer to talk to his OP Cardiologist about any need for anticoagulation going forward, though now the symptoms are seeming less like TIA and more seizure-related  Continued sodium management per ICU/primary team    Clinically improved  recommend Andrew follow up with his outpatient neurologist within the next few weeks if possible.  Started on depakote        Clinically Significant Risk Factors     # Overweight: Estimated body mass index is 28.46 kg/m  as calculated from the following:    Height as of this encounter: 1.676 m (5' 6\").    Weight as of this encounter: 80 kg (176 lb 4.8 oz).       Follow-ups Needed After Discharge   Follow-up Appointments     Follow Up and recommended labs and tests      Follow up with Nursing home physician.  No follow up labs or test are   needed.    Follow up with Neurology team       As advised in 2 weeks        {Additional follow-up instructions/to-do's for PCP    : none    Unresulted Labs Ordered in the Past 30 Days of this Admission       No orders found from 6/7/2024 to 7/8/2024.        These results will be followed up by PCP    Discharge Disposition   Discharged to nursing home  Condition at discharge: Stable        Consultations This Hospital Stay   SPEECH LANGUAGE PATH ADULT IP CONSULT  PHARMACY IP CONSULT  PHARMACY IP CONSULT  PHARMACY IP CONSULT  PHYSICAL THERAPY ADULT IP CONSULT  OCCUPATIONAL THERAPY ADULT IP CONSULT  REHAB ADMISSIONS LIAISON IP CONSULT  CARE MANAGEMENT / SOCIAL WORK IP CONSULT  NEUROLOGY IP CONSULT  PHYSICAL THERAPY ADULT IP CONSULT  OCCUPATIONAL THERAPY ADULT IP CONSULT  SMOKING CESSATION PROGRAM IP CONSULT    Code Status   Prior    Time Spent on this Encounter   Ellis OLVERA MD, personally saw the patient today and spent greater than 30 minutes discharging this patient.       Ellis Franco MD  [facility]  [address] " [facility] [address]  Phone: [phone]  Fax: [phone]  ______________________________________________________________________    Physical Exam   Vital Signs:                    Weight: 176 lbs 4.8 oz         GENERAL: The patient is not in any acute distressed. Awake and alert.  HEENT: Nonicteric sclerae, PERRLA, EOMI. Oropharynx clear. Moist mucous membranes. Conjunctivae appear well perfused.  HEART: Regular rate and rhythm without murmurs.  LUNGS: Clear to auscultation bilaterally. No wheezing or crackles.  ABDOMEN: Soft, positive bowel sounds, nontender.  SKIN: No rash, no excessive bruising, petechiae, or purpura.  EXTREMITIES : no rashes, no swelling in legs.  NEUROLOGIC: conscious and oriented, follows commands, no obvious focal deficits.  ROS: All other systems negative          Primary Care Physician   Physician No Ref-Primary    Discharge Orders      General info for SNF    Length of Stay Estimate: Short Term Care: Estimated # of Days <30  Condition at Discharge: Stable  Level of care:skilled   Rehabilitation Potential: Fair  Admission H&P remains valid and up-to-date: Yes  Recent Chemotherapy: N/A  Use Nursing Home Standing Orders: Yes     Mantoux instructions    Give two-step Mantoux (PPD) Per Facility Policy Yes     Follow Up and recommended labs and tests    Follow up with Nursing home physician.  No follow up labs or test are needed.    Follow up with Neurology team       As advised in 2 weeks     Reason for your hospital stay    Acute confusion     Activity - Up with nursing assistance     Physical Therapy Adult Consult    Evaluate and treat as clinically indicated.    Reason:  deconditioning     Occupational Therapy Adult Consult    Evaluate and treat as clinically indicated.    Reason:  deconditioning     Diet    Follow this diet upon discharge: Orders Placed This Encounter      Fluid restriction 1500 ML FLUID      Combination Diet Low Saturated Fat Na <2400mg Diet        Significant Results and Procedures   Most Recent 3 CBC's:  Recent Labs   Lab Test 07/11/24 0445 07/08/24  0732 07/07/24  1925   WBC 8.3 8.2 8.4   HGB 12.9* 13.5 14.0   MCV 92 93 93    195 203     Most Recent 3 BMP's:  Recent Labs   Lab Test 07/13/24  0502 07/12/24  1126 07/11/24  1153 07/11/24  0757 07/11/24  0445 07/10/24  2343 07/10/24  0514 07/09/24  0754 07/09/24  0657 07/08/24  0751 07/08/24  0732   NA  --   --   --   --  134*  --  135  --  131*  --  134*   POTASSIUM  --   --   --   --   --   --  4.3  --  4.1  --  4.4   CHLORIDE  --   --   --   --   --   --  102  --  98  --  99   CO2  --   --   --   --   --   --  25  --  23  --  27   BUN  --   --   --   --   --   --  13.5  --  15.3  --  16.1   CR  --   --   --   --   --   --  1.00  --  1.06  --  1.17   ANIONGAP  --   --   --   --   --   --  8  --  10  --  8   NELY  --   --   --   --   --   --  8.3*  --  8.4*  --  8.6*   GLC 88 98 152*   < >  --    < > 102*   < > 107*   < > 120*    < > = values in this interval not displayed.     Most Recent 2 LFT's:No lab results found.  Most Recent 3 INR's:  Recent Labs   Lab Test 07/07/24 1925   INR 1.02       Discharge Medications   Discharge Medication List as of 7/16/2024 12:12 PM        START taking these medications    Details   divalproex sodium delayed-release (DEPAKOTE) 500 MG DR tablet Take 1 tablet (500 mg) by mouth every 12 hours, Disp-5 tablet, R-0, Local Print      hydrOXYzine HCl (ATARAX) 25 MG tablet Take 1 tablet (25 mg) by mouth nightly as needed for other or itching (adjuvant pain), Disp-5 tablet, R-0, Local Print           CONTINUE these medications which have NOT CHANGED    Details   aspirin (ASA) 325 MG tablet Take 1 tablet by mouth daily, Historical      Calcium Carb-Cholecalciferol (CALCIUM-VITAMIN D3) 600-400 MG-UNIT CAPS Take 2 tablets by mouth daily, Historical      levETIRAcetam (KEPPRA) 1000 MG tablet Take 1,000 mg by mouth 2 times daily, Historical      metoprolol succinate ER  (TOPROL-XL) 50 MG 24 hr tablet Take 50 mg by mouth daily, Historical      omeprazole (PRILOSEC) 20 MG DR capsule Take 20 mg by mouth daily as needed (heartburn), Historical      rosuvastatin (CRESTOR) 10 MG tablet Take 10 mg by mouth daily, Historical           Allergies   Allergies   Allergen Reactions    Penicillins Rash     Denies throat swelling or breathing difficulty  Denies throat swelling or breathing difficulty      Pollen Extract Cough

## 2024-09-12 ENCOUNTER — APPOINTMENT (OUTPATIENT)
Dept: RADIOLOGY | Facility: HOSPITAL | Age: 84
End: 2024-09-12
Attending: EMERGENCY MEDICINE
Payer: COMMERCIAL

## 2024-09-12 ENCOUNTER — APPOINTMENT (OUTPATIENT)
Dept: CT IMAGING | Facility: HOSPITAL | Age: 84
End: 2024-09-12
Attending: EMERGENCY MEDICINE
Payer: COMMERCIAL

## 2024-09-12 ENCOUNTER — HOSPITAL ENCOUNTER (OUTPATIENT)
Facility: HOSPITAL | Age: 84
Setting detail: OBSERVATION
Discharge: HOME | End: 2024-09-15
Attending: EMERGENCY MEDICINE | Admitting: STUDENT IN AN ORGANIZED HEALTH CARE EDUCATION/TRAINING PROGRAM
Payer: COMMERCIAL

## 2024-09-12 DIAGNOSIS — R53.1 GENERAL WEAKNESS: ICD-10-CM

## 2024-09-12 DIAGNOSIS — U07.1 COVID-19: ICD-10-CM

## 2024-09-12 LAB
ALBUMIN SERPL BCG-MCNC: 4 G/DL (ref 3.5–5.2)
ALBUMIN UR-MCNC: 10 MG/DL
ALP SERPL-CCNC: 49 U/L (ref 40–150)
ALT SERPL W P-5'-P-CCNC: 76 U/L (ref 0–70)
ANION GAP SERPL CALCULATED.3IONS-SCNC: 11 MMOL/L (ref 7–15)
APPEARANCE UR: CLEAR
APTT PPP: 25 SECONDS (ref 22–38)
AST SERPL W P-5'-P-CCNC: 36 U/L (ref 0–45)
BASOPHILS # BLD AUTO: 0.1 10E3/UL (ref 0–0.2)
BASOPHILS NFR BLD AUTO: 1 %
BILIRUB SERPL-MCNC: 0.9 MG/DL
BILIRUB UR QL STRIP: NEGATIVE
BUN SERPL-MCNC: 21.4 MG/DL (ref 8–23)
CALCIUM SERPL-MCNC: 9 MG/DL (ref 8.8–10.4)
CHLORIDE SERPL-SCNC: 100 MMOL/L (ref 98–107)
COLOR UR AUTO: YELLOW
CREAT SERPL-MCNC: 1 MG/DL (ref 0.67–1.17)
EGFRCR SERPLBLD CKD-EPI 2021: 75 ML/MIN/1.73M2
EOSINOPHIL # BLD AUTO: 0.2 10E3/UL (ref 0–0.7)
EOSINOPHIL NFR BLD AUTO: 3 %
ERYTHROCYTE [DISTWIDTH] IN BLOOD BY AUTOMATED COUNT: 15.2 % (ref 10–15)
FLUAV RNA SPEC QL NAA+PROBE: NEGATIVE
FLUBV RNA RESP QL NAA+PROBE: NEGATIVE
GLUCOSE SERPL-MCNC: 106 MG/DL (ref 70–99)
GLUCOSE UR STRIP-MCNC: NEGATIVE MG/DL
HCO3 SERPL-SCNC: 24 MMOL/L (ref 22–29)
HCT VFR BLD AUTO: 43.6 % (ref 40–53)
HGB BLD-MCNC: 14.1 G/DL (ref 13.3–17.7)
HGB UR QL STRIP: NEGATIVE
IMM GRANULOCYTES # BLD: 0 10E3/UL
IMM GRANULOCYTES NFR BLD: 0 %
INR PPP: 1.05 (ref 0.85–1.15)
KETONES UR STRIP-MCNC: 10 MG/DL
LEUKOCYTE ESTERASE UR QL STRIP: NEGATIVE
LYMPHOCYTES # BLD AUTO: 1.1 10E3/UL (ref 0.8–5.3)
LYMPHOCYTES NFR BLD AUTO: 13 %
MAGNESIUM SERPL-MCNC: 1.9 MG/DL (ref 1.7–2.3)
MCH RBC QN AUTO: 31.3 PG (ref 26.5–33)
MCHC RBC AUTO-ENTMCNC: 32.3 G/DL (ref 31.5–36.5)
MCV RBC AUTO: 97 FL (ref 78–100)
MONOCYTES # BLD AUTO: 0.6 10E3/UL (ref 0–1.3)
MONOCYTES NFR BLD AUTO: 7 %
NEUTROPHILS # BLD AUTO: 6.2 10E3/UL (ref 1.6–8.3)
NEUTROPHILS NFR BLD AUTO: 76 %
NITRATE UR QL: NEGATIVE
NRBC # BLD AUTO: 0 10E3/UL
NRBC BLD AUTO-RTO: 0 /100
PH UR STRIP: 6.5 [PH] (ref 5–7)
PHOSPHATE SERPL-MCNC: 2.9 MG/DL (ref 2.5–4.5)
PLATELET # BLD AUTO: 126 10E3/UL (ref 150–450)
POTASSIUM SERPL-SCNC: 4.6 MMOL/L (ref 3.4–5.3)
PROT SERPL-MCNC: 7.5 G/DL (ref 6.4–8.3)
RBC # BLD AUTO: 4.51 10E6/UL (ref 4.4–5.9)
RBC URINE: 1 /HPF
RSV RNA SPEC NAA+PROBE: NEGATIVE
SARS-COV-2 RNA RESP QL NAA+PROBE: POSITIVE
SODIUM SERPL-SCNC: 135 MMOL/L (ref 135–145)
SP GR UR STRIP: 1.03 (ref 1–1.03)
TROPONIN T SERPL HS-MCNC: 17 NG/L
TROPONIN T SERPL HS-MCNC: 18 NG/L
UROBILINOGEN UR STRIP-MCNC: <2 MG/DL
WBC # BLD AUTO: 8.1 10E3/UL (ref 4–11)
WBC URINE: <1 /HPF

## 2024-09-12 PROCEDURE — 84100 ASSAY OF PHOSPHORUS: CPT | Performed by: EMERGENCY MEDICINE

## 2024-09-12 PROCEDURE — 84484 ASSAY OF TROPONIN QUANT: CPT | Performed by: EMERGENCY MEDICINE

## 2024-09-12 PROCEDURE — 93005 ELECTROCARDIOGRAM TRACING: CPT | Performed by: EMERGENCY MEDICINE

## 2024-09-12 PROCEDURE — 81001 URINALYSIS AUTO W/SCOPE: CPT | Performed by: EMERGENCY MEDICINE

## 2024-09-12 PROCEDURE — 70450 CT HEAD/BRAIN W/O DYE: CPT

## 2024-09-12 PROCEDURE — 99222 1ST HOSP IP/OBS MODERATE 55: CPT | Performed by: FAMILY MEDICINE

## 2024-09-12 PROCEDURE — 99285 EMERGENCY DEPT VISIT HI MDM: CPT | Mod: 25

## 2024-09-12 PROCEDURE — G0378 HOSPITAL OBSERVATION PER HR: HCPCS

## 2024-09-12 PROCEDURE — 82040 ASSAY OF SERUM ALBUMIN: CPT | Performed by: EMERGENCY MEDICINE

## 2024-09-12 PROCEDURE — 85730 THROMBOPLASTIN TIME PARTIAL: CPT | Performed by: EMERGENCY MEDICINE

## 2024-09-12 PROCEDURE — 71046 X-RAY EXAM CHEST 2 VIEWS: CPT

## 2024-09-12 PROCEDURE — 85025 COMPLETE CBC W/AUTO DIFF WBC: CPT | Performed by: EMERGENCY MEDICINE

## 2024-09-12 PROCEDURE — 36415 COLL VENOUS BLD VENIPUNCTURE: CPT | Performed by: EMERGENCY MEDICINE

## 2024-09-12 PROCEDURE — 258N000003 HC RX IP 258 OP 636: Performed by: EMERGENCY MEDICINE

## 2024-09-12 PROCEDURE — 85610 PROTHROMBIN TIME: CPT | Performed by: EMERGENCY MEDICINE

## 2024-09-12 PROCEDURE — 83735 ASSAY OF MAGNESIUM: CPT | Performed by: EMERGENCY MEDICINE

## 2024-09-12 PROCEDURE — 87637 SARSCOV2&INF A&B&RSV AMP PRB: CPT | Performed by: EMERGENCY MEDICINE

## 2024-09-12 RX ORDER — DIVALPROEX SODIUM 250 MG/1
250 TABLET, DELAYED RELEASE ORAL 2 TIMES DAILY
COMMUNITY

## 2024-09-12 RX ADMIN — SODIUM CHLORIDE 1000 ML: 9 INJECTION, SOLUTION INTRAVENOUS at 20:10

## 2024-09-12 ASSESSMENT — ACTIVITIES OF DAILY LIVING (ADL)
ADLS_ACUITY_SCORE: 38

## 2024-09-12 NOTE — ED TRIAGE NOTES
Patient presents to ED for weakness that started when he woke up this morning.  Complained initially more right sided weakness, but also states some weakness to left side.  Provider called for neuro assessment.  Wife states history of seizures.  Following neuro assessment, generalized weakness noted.    Triage Assessment (Adult)       Row Name 09/12/24 1600          Triage Assessment    Airway WDL WDL        Respiratory WDL    Respiratory WDL WDL        Skin Circulation/Temperature WDL    Skin Circulation/Temperature WDL WDL        Cardiac WDL    Cardiac WDL WDL        Peripheral/Neurovascular WDL    Peripheral Neurovascular WDL WDL        Cognitive/Neuro/Behavioral WDL    Cognitive/Neuro/Behavioral WDL WDL

## 2024-09-12 NOTE — ED PROVIDER NOTES
EMERGENCY DEPARTMENT ENCOUNTER      NAME: Andrew Haines  AGE: 83 year old male  YOB: 1940  MRN: [id number]  EVALUATION DATE & TIME: No admission date for patient encounter.    PCP: No Ref-Primary, Physician    ED PROVIDER: Yolis Boyle DO      Chief Complaint   Patient presents with    Generalized Weakness         FINAL IMPRESSION:  1. General weakness    2. COVID-19          ED COURSE & MEDICAL DECISION MAKIN-year-old male was brought to the ED by family for evaluation of generalized weakness and possible mild confusion that was first noted earlier this morning.  Initially called out to triage for a stroke evaluation.  However, on exam the patient had no focal neurologic deficits.  Therefore stroke code was called.  Patient was hemodynamically stable upon arrival to the ED.  The patient had no significant complaints other than the generalized weakness.  The patient's physical exam was unremarkable.      Following his initial evaluation an EKG was obtained which revealed a ventricularly paced rhythm.    CBC, CMP, troponin, urinalysis, and magnesium were all reassuring.      CT scan of the head did not show any acute intracranial abnormalities to explain the patient's generalized weakness.      The patient was reevaluated and the family was were informed of the reassuring lab and imaging results.  The patient again had no complaints other than the fact that he could not get out of bed and ambulate secondary to his generalized weakness.  Because of this the patient was admitted for further evaluation.    The patient's case was discussed with the hospitalist.    Repeat troponin was unchanged.     The patient's COVID test was positive.  This is likely the cause of the patient's generalized weakness.  The patient does not have any respiratory difficulty and he is not hypoxic here in the ED.        Pertinent Labs & Imaging studies reviewed. (See chart for details)  4:00 PM I met with the patient  "to gather history and to perform my initial exam. We discussed plans for the ED course, including diagnostic testing and treatment.   7:30 PM rechecked and updated patient.  8:03 PM Spoke to Hospitalist Dr. Gondal who accepts patient for admission.      At the conclusion of the encounter I discussed the results of all of the tests and the disposition. The questions were answered. The patient or family acknowledged understanding and was agreeable with the care plan.     Medical Decision Making    History:  Supplemental history from: Documented in chart and Family Member/Significant Other  External Record(s) reviewed: Documented in chart    Work Up:  Chart documentation includes differential considered and any EKGs or imaging independently interpreted by provider, where specified.  In additional to work up documented, I considered the following work up: Documented in chart, if applicable.    External consultation:  Discussion of management with another provider: Documented in chart, if applicable    Complicating factors:  Care impacted by chronic illness: Hypertension and Other: Parkinson's   Care affected by social determinants of health: Access to Medical Care    Disposition considerations: Admit.      PPE worn: n95 mask, goggles    MEDICATIONS GIVEN IN THE EMERGENCY:  Medications   sodium chloride 0.9% BOLUS 1,000 mL (1,000 mLs Intravenous $New Bag 9/12/24 2010)       NEW PRESCRIPTIONS STARTED AT TODAY'S ER VISIT  Current Discharge Medication List             =================================================================    HPI    Patient information was obtained from: Patient's wife    Use of : N/A        Andrew Haines is a 83 year old male with a pertinent history of hypertension, cardiac pacemaker, and parkinson's disease who presents to this ED by car for evaluation of generalized weakness.    Patient's wife reports he woke up \"weak today\" and over the course of the day it progressively got " "worse. Patient does have parkinson's. Patient had a scan today and could not get out of the car before his appointment. His wife thinks he had a seizure. He does have a history of seizures. Wife states he had multiple in July.     Patient denies chest pain, cough, shortness of breath, nausea, and vomiting. There were no other complaints/concerns at this time.       REVIEW OF SYSTEMS   Review of Systems     PAST MEDICAL HISTORY:  History reviewed. No pertinent past medical history.    PAST SURGICAL HISTORY:  History reviewed. No pertinent surgical history.        CURRENT MEDICATIONS:    No current outpatient medications on file.      ALLERGIES:  Allergies   Allergen Reactions    Penicillins Rash     Denies throat swelling or breathing difficulty  Denies throat swelling or breathing difficulty      Pollen Extract Cough       FAMILY HISTORY:  Family History   Problem Relation Age of Onset    Cerebrovascular Disease Father        SOCIAL HISTORY:   Social History     Socioeconomic History    Marital status:      Spouse name: None    Number of children: None    Years of education: None    Highest education level: None       VITALS:  BP (!) 151/67   Pulse 69   Temp 99.4  F (37.4  C) (Oral)   Resp 25   Ht 1.651 m (5' 5\")   Wt 81.6 kg (180 lb)   SpO2 93%   BMI 29.95 kg/m      PHYSICAL EXAM    General presentation: Alert, Vital signs reviewed. NAD  HENT: ENT inspection is normal. Oropharynx is moist and clear.   Eye: Pupils are equal and reactive to light. EOMI  Neck: The neck is supple, with full ROM, with no evidence of meningismus.  Pulmonary: Currently in no acute respiratory distress. Normal, non labored respirations, the lung sounds are normal with good equal air movement. Clear to auscultation bilaterally.   Circulatory: Regular rate and rhythm. Peripheral pulses are strong and equal. No murmurs, rubs, or gallops.   Abdominal: The abdomen is soft. Nontender. No rigidity, guarding, or rebound. Bowel sounds " normal.   Neurologic: Alert, oriented to person, place,  No motor deficit. No sensory deficit. Cranial nerves II through XII are intact.  Musculoskeletal: No extremity tenderness. Full range of motion in all extremities. No extremity edema.   Skin: Skin color is normal. No rash. Warm. Dry to touch.     National Institutes of Health Stroke Scale  Exam Interval: Baseline   Score    Level of consciousness: (0)   Alert, keenly responsive    LOC questions: (0)   Answers both questions correctly    LOC commands: (0)   Performs both tasks correctly    Best gaze: (0)   Normal    Visual: (0)   No visual loss    Facial palsy: (0)   Normal symmetrical movements    Motor arm (left): (0)   No drift    Motor arm (right): (0)   No drift    Motor leg (left): (0)   No drift    Motor leg (right): (0)   No drift    Limb ataxia: (0)   Absent    Sensory: (0)   Normal- no sensory loss    Best language: (0)   Normal- no aphasia    Dysarthria: (0)   Normal    Extinction and inattention: (0)   No abnormality        Total Score:  0          LAB:  All pertinent labs reviewed and interpreted.  Results for orders placed or performed during the hospital encounter of 09/12/24   Head CT w/o contrast    Impression    IMPRESSION:  1.  No acute intracranial process.  2.  Severe sequela of chronic ischemic small vessel disease.  3.  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   Chest XR,  PA & LAT    Impression    IMPRESSION: Heart size and vascularity are within normal limits for technique. Cardiac leads project over the RA and RV. No focal infiltrates. No pleural effusions. Mild degenerative changes in the thoracic spine.   Result Value Ref Range    INR 1.05 0.85 - 1.15   Partial thromboplastin time   Result Value Ref Range    aPTT 25 22 - 38 Seconds   Comprehensive metabolic panel   Result Value Ref Range    Sodium 135 135 - 145 mmol/L    Potassium 4.6 3.4 - 5.3 mmol/L    Carbon Dioxide (CO2) 24 22 - 29 mmol/L    Anion Gap 11 7 - 15 mmol/L     Urea Nitrogen 21.4 8.0 - 23.0 mg/dL    Creatinine 1.00 0.67 - 1.17 mg/dL    GFR Estimate 75 >60 mL/min/1.73m2    Calcium 9.0 8.8 - 10.4 mg/dL    Chloride 100 98 - 107 mmol/L    Glucose 106 (H) 70 - 99 mg/dL    Alkaline Phosphatase 49 40 - 150 U/L    AST 36 0 - 45 U/L    ALT 76 (H) 0 - 70 U/L    Protein Total 7.5 6.4 - 8.3 g/dL    Albumin 4.0 3.5 - 5.2 g/dL    Bilirubin Total 0.9 <=1.2 mg/dL   Result Value Ref Range    Magnesium 1.9 1.7 - 2.3 mg/dL   Result Value Ref Range    Troponin T, High Sensitivity 18 <=22 ng/L   UA with Microscopic reflex to Culture    Specimen: Urine, Clean Catch   Result Value Ref Range    Color Urine Yellow Colorless, Straw, Light Yellow, Yellow    Appearance Urine Clear Clear    Glucose Urine Negative Negative mg/dL    Bilirubin Urine Negative Negative    Ketones Urine 10 (A) Negative mg/dL    Specific Gravity Urine 1.027 1.001 - 1.030    Blood Urine Negative Negative    pH Urine 6.5 5.0 - 7.0    Protein Albumin Urine 10 (A) Negative mg/dL    Urobilinogen Urine <2.0 <2.0 mg/dL    Nitrite Urine Negative Negative    Leukocyte Esterase Urine Negative Negative    RBC Urine 1 <=2 /HPF    WBC Urine <1 <=5 /HPF   CBC with platelets and differential   Result Value Ref Range    WBC Count 8.1 4.0 - 11.0 10e3/uL    RBC Count 4.51 4.40 - 5.90 10e6/uL    Hemoglobin 14.1 13.3 - 17.7 g/dL    Hematocrit 43.6 40.0 - 53.0 %    MCV 97 78 - 100 fL    MCH 31.3 26.5 - 33.0 pg    MCHC 32.3 31.5 - 36.5 g/dL    RDW 15.2 (H) 10.0 - 15.0 %    Platelet Count 126 (L) 150 - 450 10e3/uL    % Neutrophils 76 %    % Lymphocytes 13 %    % Monocytes 7 %    % Eosinophils 3 %    % Basophils 1 %    % Immature Granulocytes 0 %    NRBCs per 100 WBC 0 <1 /100    Absolute Neutrophils 6.2 1.6 - 8.3 10e3/uL    Absolute Lymphocytes 1.1 0.8 - 5.3 10e3/uL    Absolute Monocytes 0.6 0.0 - 1.3 10e3/uL    Absolute Eosinophils 0.2 0.0 - 0.7 10e3/uL    Absolute Basophils 0.1 0.0 - 0.2 10e3/uL    Absolute Immature Granulocytes 0.0 <=0.4  10e3/uL    Absolute NRBCs 0.0 10e3/uL   Symptomatic Influenza A/B, RSV, & SARS-CoV2 PCR (COVID-19) Nose    Specimen: Nose; Swab   Result Value Ref Range    Influenza A PCR Negative Negative    Influenza B PCR Negative Negative    RSV PCR Negative Negative    SARS CoV2 PCR Positive (A) Negative   Troponin T, High Sensitivity (now)   Result Value Ref Range    Troponin T, High Sensitivity 17 <=22 ng/L   Result Value Ref Range    Phosphorus 2.9 2.5 - 4.5 mg/dL       RADIOLOGY:  Reviewed all pertinent imaging. Please see official radiology report.  Chest XR,  PA & LAT   Final Result   IMPRESSION: Heart size and vascularity are within normal limits for technique. Cardiac leads project over the RA and RV. No focal infiltrates. No pleural effusions. Mild degenerative changes in the thoracic spine.      Head CT w/o contrast   Final Result   IMPRESSION:   1.  No acute intracranial process.   2.  Severe sequela of chronic ischemic small vessel disease.   3.  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          EKG:    Ventricularly paced rhythm.  Rate of 75.  Compared to the EKG on 7/7/2024 no significant changes are noted.    I have independently reviewed and interpreted the EKG(s) documented above.        I, Emiloi Mclean, am serving as a scribe to document services personally performed by Yolis Boyle DO based on my observation and the provider's statements to me. I, Yolis Boyle, attest that Emilio Mclean is acting in a scribe capacity, has observed my performance of the services and has documented them in accordance with my direction.    Yolis Boyle DO  Emergency Medicine  [facility] EMERGENCY DEPARTMENT  [address] [place] [id number] [phone]       Yolis Boyle DO  09/12/24 0420

## 2024-09-13 ENCOUNTER — APPOINTMENT (OUTPATIENT)
Dept: PHYSICAL THERAPY | Facility: HOSPITAL | Age: 84
End: 2024-09-13
Attending: FAMILY MEDICINE
Payer: COMMERCIAL

## 2024-09-13 LAB
ALBUMIN SERPL BCG-MCNC: 3.1 G/DL (ref 3.5–5.2)
ALP SERPL-CCNC: 40 U/L (ref 40–150)
ALT SERPL W P-5'-P-CCNC: 53 U/L (ref 0–70)
ANION GAP SERPL CALCULATED.3IONS-SCNC: 7 MMOL/L (ref 7–15)
AST SERPL W P-5'-P-CCNC: 26 U/L (ref 0–45)
BASOPHILS # BLD AUTO: 0.1 10E3/UL (ref 0–0.2)
BASOPHILS NFR BLD AUTO: 1 %
BILIRUB SERPL-MCNC: 0.5 MG/DL
BUN SERPL-MCNC: 16 MG/DL (ref 8–23)
CALCIUM SERPL-MCNC: 8.1 MG/DL (ref 8.8–10.4)
CHLORIDE SERPL-SCNC: 102 MMOL/L (ref 98–107)
CREAT SERPL-MCNC: 0.98 MG/DL (ref 0.67–1.17)
CRP SERPL-MCNC: 32.9 MG/L
D DIMER PPP FEU-MCNC: 1.99 UG/ML FEU (ref 0–0.5)
EGFRCR SERPLBLD CKD-EPI 2021: 77 ML/MIN/1.73M2
EOSINOPHIL # BLD AUTO: 0.2 10E3/UL (ref 0–0.7)
EOSINOPHIL NFR BLD AUTO: 3 %
ERYTHROCYTE [DISTWIDTH] IN BLOOD BY AUTOMATED COUNT: 15.1 % (ref 10–15)
GLUCOSE BLDC GLUCOMTR-MCNC: 78 MG/DL (ref 70–99)
GLUCOSE BLDC GLUCOMTR-MCNC: 96 MG/DL (ref 70–99)
GLUCOSE SERPL-MCNC: 92 MG/DL (ref 70–99)
HCO3 SERPL-SCNC: 26 MMOL/L (ref 22–29)
HCT VFR BLD AUTO: 35.9 % (ref 40–53)
HGB BLD-MCNC: 11.8 G/DL (ref 13.3–17.7)
IMM GRANULOCYTES # BLD: 0 10E3/UL
IMM GRANULOCYTES NFR BLD: 0 %
INR PPP: 1.18 (ref 0.85–1.15)
LYMPHOCYTES # BLD AUTO: 1.2 10E3/UL (ref 0.8–5.3)
LYMPHOCYTES NFR BLD AUTO: 20 %
MCH RBC QN AUTO: 31.9 PG (ref 26.5–33)
MCHC RBC AUTO-ENTMCNC: 32.9 G/DL (ref 31.5–36.5)
MCV RBC AUTO: 97 FL (ref 78–100)
MONOCYTES # BLD AUTO: 0.6 10E3/UL (ref 0–1.3)
MONOCYTES NFR BLD AUTO: 11 %
NEUTROPHILS # BLD AUTO: 3.8 10E3/UL (ref 1.6–8.3)
NEUTROPHILS NFR BLD AUTO: 65 %
NRBC # BLD AUTO: 0 10E3/UL
NRBC BLD AUTO-RTO: 0 /100
PLATELET # BLD AUTO: 101 10E3/UL (ref 150–450)
POTASSIUM SERPL-SCNC: 4.3 MMOL/L (ref 3.4–5.3)
PROT SERPL-MCNC: 5.8 G/DL (ref 6.4–8.3)
RBC # BLD AUTO: 3.7 10E6/UL (ref 4.4–5.9)
SODIUM SERPL-SCNC: 135 MMOL/L (ref 135–145)
WBC # BLD AUTO: 5.8 10E3/UL (ref 4–11)

## 2024-09-13 PROCEDURE — 97162 PT EVAL MOD COMPLEX 30 MIN: CPT | Mod: GP

## 2024-09-13 PROCEDURE — 99233 SBSQ HOSP IP/OBS HIGH 50: CPT | Performed by: FAMILY MEDICINE

## 2024-09-13 PROCEDURE — 85610 PROTHROMBIN TIME: CPT | Performed by: FAMILY MEDICINE

## 2024-09-13 PROCEDURE — 250N000011 HC RX IP 250 OP 636: Performed by: FAMILY MEDICINE

## 2024-09-13 PROCEDURE — 258N000003 HC RX IP 258 OP 636: Performed by: FAMILY MEDICINE

## 2024-09-13 PROCEDURE — 82040 ASSAY OF SERUM ALBUMIN: CPT | Performed by: FAMILY MEDICINE

## 2024-09-13 PROCEDURE — 85025 COMPLETE CBC W/AUTO DIFF WBC: CPT | Performed by: FAMILY MEDICINE

## 2024-09-13 PROCEDURE — 86140 C-REACTIVE PROTEIN: CPT | Performed by: FAMILY MEDICINE

## 2024-09-13 PROCEDURE — 96372 THER/PROPH/DIAG INJ SC/IM: CPT | Performed by: FAMILY MEDICINE

## 2024-09-13 PROCEDURE — 82962 GLUCOSE BLOOD TEST: CPT

## 2024-09-13 PROCEDURE — 97110 THERAPEUTIC EXERCISES: CPT | Mod: GP

## 2024-09-13 PROCEDURE — G0378 HOSPITAL OBSERVATION PER HR: HCPCS

## 2024-09-13 PROCEDURE — 97530 THERAPEUTIC ACTIVITIES: CPT | Mod: GP

## 2024-09-13 PROCEDURE — 85379 FIBRIN DEGRADATION QUANT: CPT | Performed by: FAMILY MEDICINE

## 2024-09-13 PROCEDURE — 250N000013 HC RX MED GY IP 250 OP 250 PS 637: Performed by: FAMILY MEDICINE

## 2024-09-13 PROCEDURE — 36415 COLL VENOUS BLD VENIPUNCTURE: CPT | Performed by: FAMILY MEDICINE

## 2024-09-13 PROCEDURE — 96374 THER/PROPH/DIAG INJ IV PUSH: CPT

## 2024-09-13 RX ORDER — PANTOPRAZOLE SODIUM 40 MG/1
40 TABLET, DELAYED RELEASE ORAL DAILY PRN
Status: DISCONTINUED | OUTPATIENT
Start: 2024-09-13 | End: 2024-09-15 | Stop reason: HOSPADM

## 2024-09-13 RX ORDER — ROSUVASTATIN CALCIUM 10 MG/1
10 TABLET, COATED ORAL EVERY OTHER DAY
Status: DISCONTINUED | OUTPATIENT
Start: 2024-09-14 | End: 2024-09-15 | Stop reason: HOSPADM

## 2024-09-13 RX ORDER — DIVALPROEX SODIUM 250 MG/1
250 TABLET, DELAYED RELEASE ORAL 2 TIMES DAILY
Status: DISCONTINUED | OUTPATIENT
Start: 2024-09-13 | End: 2024-09-15 | Stop reason: HOSPADM

## 2024-09-13 RX ORDER — ONDANSETRON 2 MG/ML
4 INJECTION INTRAMUSCULAR; INTRAVENOUS EVERY 6 HOURS PRN
Status: DISCONTINUED | OUTPATIENT
Start: 2024-09-13 | End: 2024-09-15 | Stop reason: HOSPADM

## 2024-09-13 RX ORDER — ENOXAPARIN SODIUM 100 MG/ML
0.5 INJECTION SUBCUTANEOUS 2 TIMES DAILY
Status: DISCONTINUED | OUTPATIENT
Start: 2024-09-13 | End: 2024-09-15 | Stop reason: HOSPADM

## 2024-09-13 RX ORDER — ASPIRIN 325 MG
325 TABLET ORAL EVERY MORNING
Status: DISCONTINUED | OUTPATIENT
Start: 2024-09-13 | End: 2024-09-15 | Stop reason: HOSPADM

## 2024-09-13 RX ORDER — ACETAMINOPHEN 650 MG/1
650 SUPPOSITORY RECTAL EVERY 4 HOURS PRN
Status: DISCONTINUED | OUTPATIENT
Start: 2024-09-13 | End: 2024-09-15 | Stop reason: HOSPADM

## 2024-09-13 RX ORDER — ACETAMINOPHEN 325 MG/1
650 TABLET ORAL EVERY 4 HOURS PRN
Status: DISCONTINUED | OUTPATIENT
Start: 2024-09-13 | End: 2024-09-15 | Stop reason: HOSPADM

## 2024-09-13 RX ORDER — LEVETIRACETAM 500 MG/1
1000 TABLET ORAL 2 TIMES DAILY
Status: DISCONTINUED | OUTPATIENT
Start: 2024-09-13 | End: 2024-09-15 | Stop reason: HOSPADM

## 2024-09-13 RX ORDER — METOPROLOL SUCCINATE 50 MG/1
50 TABLET, EXTENDED RELEASE ORAL EVERY MORNING
Status: DISCONTINUED | OUTPATIENT
Start: 2024-09-13 | End: 2024-09-15 | Stop reason: HOSPADM

## 2024-09-13 RX ADMIN — LEVETIRACETAM 1000 MG: 500 TABLET, FILM COATED ORAL at 21:33

## 2024-09-13 RX ADMIN — ENOXAPARIN SODIUM 40 MG: 40 INJECTION SUBCUTANEOUS at 21:33

## 2024-09-13 RX ADMIN — ASPIRIN 325 MG ORAL TABLET 325 MG: 325 PILL ORAL at 09:47

## 2024-09-13 RX ADMIN — DIVALPROEX SODIUM 250 MG: 250 TABLET, DELAYED RELEASE ORAL at 21:33

## 2024-09-13 RX ADMIN — SODIUM CHLORIDE 50 ML: 9 INJECTION, SOLUTION INTRAVENOUS at 02:51

## 2024-09-13 RX ADMIN — LEVETIRACETAM 1000 MG: 500 TABLET, FILM COATED ORAL at 09:47

## 2024-09-13 RX ADMIN — METOPROLOL SUCCINATE 50 MG: 50 TABLET, EXTENDED RELEASE ORAL at 09:47

## 2024-09-13 RX ADMIN — REMDESIVIR 100 MG: 100 INJECTION, POWDER, LYOPHILIZED, FOR SOLUTION INTRAVENOUS at 23:56

## 2024-09-13 RX ADMIN — REMDESIVIR 200 MG: 100 INJECTION, POWDER, LYOPHILIZED, FOR SOLUTION INTRAVENOUS at 01:53

## 2024-09-13 RX ADMIN — DIVALPROEX SODIUM 250 MG: 250 TABLET, DELAYED RELEASE ORAL at 09:46

## 2024-09-13 ASSESSMENT — ACTIVITIES OF DAILY LIVING (ADL)
ADLS_ACUITY_SCORE: 42
ADLS_ACUITY_SCORE: 37
ADLS_ACUITY_SCORE: 42
ADLS_ACUITY_SCORE: 44
ADLS_ACUITY_SCORE: 45
ADLS_ACUITY_SCORE: 42
ADLS_ACUITY_SCORE: 42
ADLS_ACUITY_SCORE: 37
ADLS_ACUITY_SCORE: 44
ADLS_ACUITY_SCORE: 42
DEPENDENT_IADLS:: TRANSPORTATION
ADLS_ACUITY_SCORE: 37
ADLS_ACUITY_SCORE: 44
ADLS_ACUITY_SCORE: 42
ADLS_ACUITY_SCORE: 44
ADLS_ACUITY_SCORE: 37
ADLS_ACUITY_SCORE: 44

## 2024-09-13 NOTE — PROGRESS NOTES
Patient admitted to room 5 at approximately 2230 via bed from emergency room.  Reason for Admission: General weakness  Report received from: Read report  Patient was accompanied by Self.  Discharge transportation provided by:  Patient ambulated/transferred:  AX2. self.  Patient is alert and orientated x 2.  Outpatient Observation education provided to: (patient, family, friend)  MDRO Education done if applicable (MRSA, VRE, etc)  Safety risks were identified during admission:  fall.   Yellow risk/fall band applied:  Yes  Home meds sent home: No  Home meds sent to pharmacy:No IF YES add 1/2 sheet laminated page reminder to chart/clipboard   Detailed Belongings:  Clothing, cell phone.

## 2024-09-13 NOTE — PROGRESS NOTES
PRIMARY DIAGNOSIS: GENERALIZED WEAKNESS    OUTPATIENT/OBSERVATION GOALS TO BE MET BEFORE DISCHARGE  1. Orthostatic performed: No    2. Tolerating PO medications: Yes    3. Return to near baseline physical activity: No    4. Cleared for discharge by consultants (if involved): No    Discharge Planner Nurse   Safe discharge environment identified: No  Barriers to discharge: Yes       Entered by: Tamar Jones RN 09/13/2024 2:10 PM   Patient no longer has his 1 to 1 - wife is at bedside at present.

## 2024-09-13 NOTE — PROGRESS NOTES
"   09/13/24 2009   Appointment Info   Signing Clinician's Name / Credentials (PT) Emelinagina Mayes PT   Quick Adds   Quick Adds Certification   Living Environment   People in Home spouse   Current Living Arrangements house   Home Accessibility stairs to enter home   Number of Stairs, Main Entrance 3   Stair Railings, Main Entrance railings safe and in good condition   Self-Care   Equipment Currently Used at Home walker, rolling   Activity/Exercise/Self-Care Comment spouse assist with bathing and IADL's; pt ambulates with FWW   General Information   Onset of Illness/Injury or Date of Surgery 09/12/24   Referring Physician Dr. Live   Patient/Family Therapy Goals Statement (PT) none stated   Pertinent History of Current Problem (include personal factors and/or comorbidities that impact the POC) COVID, resp. failure, weakness, AMS; PMH of  hypertension, paroxysmal atrial fibrillation, CVA, seizure disorder, MR, OA, carotid artery stenosis, cerebral ventriculomegaly and Parkinson's disease   Existing Precautions/Restrictions fall   Cognition   Affect/Mental Status (Cognition) confused;low arousal/lethargic   Orientation Status (Cognition) oriented to;person;place;disoriented to;time;situation  (states \"hospital\" for place but wrong name of hospital)   Follows Commands (Cognition) WFL   Integumentary/Edema   Integumentary/Edema Comments Velcro lymphedema wrap in place on RLE   Range of Motion (ROM)   Range of Motion ROM is WFL   Strength (Manual Muscle Testing)   Strength (Manual Muscle Testing) Deficits observed during functional mobility   Bed Mobility   Bed Mobility supine-sit;sit-supine;scooting/bridging   Scooting/Bridging [place] (Bed Mobility) minimum assist (75% patient effort);verbal cues   Supine-Sit [place] (Bed Mobility) minimum assist (75% patient effort);verbal cues   Sit-Supine [place] (Bed Mobility) supervision;verbal cues;nonverbal cues (demo/gesture)   Assistive Device (Bed Mobility) bed rails "   Comment, (Bed Mobility) pt uses bed railing at home   Transfers   Transfers sit-stand transfer   Sit-Stand Transfer   Sit-Stand [place] (Transfers) minimum assist (75% patient effort);verbal cues;nonverbal cues (demo/gesture)   Assistive Device (Sit-Stand Transfers) walker, front-wheeled   Comment, (Sit-Stand Transfer) cuing for hand placement   Gait/Stairs (Locomotion)   [place] Level (Gait) minimum assist (75% patient effort);verbal cues;nonverbal cues (demo/gesture)   Assistive Device (Gait) walker, front-wheeled   Distance in Feet (Gait) 20   Pattern (Gait) step-to   Deviations/Abnormal Patterns (Gait) base of support, wide;ledy decreased;gait speed decreased;stride length decreased;weight shifting decreased   Clinical Impression   Criteria for Skilled Therapeutic Intervention Yes, treatment indicated   PT Diagnosis (PT) impaired functional mobility   Influenced by the following impairments decreased strength, balance, activity tolerance, cognition   Functional limitations due to impairments bed mob., transfers, gait, stairs   Clinical Presentation (PT Evaluation Complexity) stable   Clinical Presentation Rationale pt presents as medically diagnosed   Clinical Decision Making (Complexity) moderate complexity   Planned Therapy Interventions (PT) balance training;bed mobility training;gait training;home exercise program;neuromuscular re-education;patient/family education;stair training;strengthening;transfer training   Risk & Benefits of therapy have been explained evaluation/treatment results reviewed;spouse/significant other;patient   PT Total Evaluation Time   PT Eval, Moderate Complexity Minutes (67378) 10   Therapy Certification   Start of care date 09/13/24   Certification date from 09/13/24   Certification date to 09/20/24   Medical Diagnosis weakness   Physical Therapy Goals   PT Frequency 5x/week   PT Predicted Duration/Target Date for Goal Attainment 09/20/24   PT Goals Transfers;Bed  Mobility;Gait;Stairs   PT: Bed Mobility Supervision/stand-by assist;Supine to/from sit   PT: Transfers Supervision/stand-by assist;Sit to/from stand;Bed to/from chair;Assistive device   PT: Gait Supervision/stand-by assist;100 feet;Rolling walker   PT: Stairs Minimal assist;3 stairs;Rail on left;Rail on right   Interventions   Interventions Quick Adds Therapeutic Activity;Therapeutic Procedure   Therapeutic Procedure/Exercise   Ther. Procedure: strength, endurance, ROM, flexibillity Minutes (20967) 15   Symptoms Noted During/After Treatment fatigue   Treatment Detail/Skilled Intervention standing BLE ex x5-10 reps with UE support, cga, cuing and demonstration for technique   Therapeutic Activity   Therapeutic Activities: dynamic activities to improve functional performance Minutes (66170) 17   Symptoms Noted During/After Treatment None   Treatment Detail/Skilled Intervention sit<>Stand and commode transfer training with FWW, cuing for hand placement, ant. wt. shift, and walker safety; assist maneuvering walker, min assist; static standing sba/cga with FWW; pt needing assist for hygiene and donning brief   PT Discharge Planning   PT Plan gait with FWW, standing LE ex   PT Discharge Recommendation (DC Rec) Transitional Care Facility   PT Rationale for DC Rec continue PT at TCU for strengthening and mobility training   PT Brief overview of current status amb. 20 feet with walker min assist   PT Equipment Needed at Discharge walker, rolling  (pt has at home)   Total Session Time   Timed Code Treatment Minutes 32   Total Session Time (sum of timed and untimed services) 42   [facility]  OUTPATIENT PHYSICAL THERAPY EVALUATION  PLAN OF TREATMENT FOR OUTPATIENT REHABILITATION  (COMPLETE FOR INITIAL CLAIMS ONLY)  Patient's Last Name, First Name, M.I.  YOB: 1940  Andrew Haines                           Provider's Name  [facility] Medical Record  No.  [id number]                             Onset Date:  09/12/24   Start of Care Date:  (P) 09/13/24   Type:     _X_PT   ___OT   ___SLP Medical Diagnosis:  (P) weakness              PT Diagnosis:  impaired functional mobility Visits from SOC:  1     See note for plan of treatment, functional goals and certification details    I CERTIFY THE NEED FOR THESE SERVICES FURNISHED UNDER        THIS PLAN OF TREATMENT AND WHILE UNDER MY CARE     (Physician co-signature of this document indicates review and certification of the therapy plan).

## 2024-09-13 NOTE — MEDICATION SCRIBE - ADMISSION MEDICATION HISTORY
Medication Scribe Admission Medication History    Admission medication history is complete. The information provided in this note is only as accurate as the sources available at the time of the update.    Information Source(s): Patient, Family member, and CareEverywhere/SureScripts via in-person and phone    Pertinent Information: Patient has medications managed by his wife, Chapincito [phone], who I was informed by patient would be best to contact regarding his medications. Chapincito went over his medications with me over the phone. Wife says she brought in his PM doses and gave them to him this evening.    Wife asked me to add this to my note note: Patient was seen at [facility] earlier today 9/12/24 and a scan revealed he has signs of Parkinsonism. Results shared with her this afternoon.    Divalproex DR tab  -wife reports they were told to cut his dose in half from 500mg BID, to 250mg BID. Wife says they have been cutting his 500mg tabs in half at home.  Rosuvastatin  -wife reports patient takes 10mg every other evening. Pt took dose tonight.    Changes made to PTA medication list:  Added: None  Deleted: Calcium carb-Vit D3, Hydroxyzine 25mg,  Changed:   Divalproex DR to 250mg BID.  Rosuvastatin 10mg to every other evening.    Allergies reviewed with patient and updates made in EHR: yes    Medication History Completed By: Delvis Aceves 9/12/2024 9:26 PM    PTA Med List   Medication Sig Last Dose    aspirin (ASA) 325 MG tablet Take 1 tablet by mouth every morning. 9/12/2024 at AM    divalproex sodium delayed-release (DEPAKOTE) 250 MG DR tablet Take 250 mg by mouth 2 times daily. 9/12/2024 at PM    levETIRAcetam (KEPPRA) 1000 MG tablet Take 1,000 mg by mouth 2 times daily 9/12/2024 at PM    metoprolol succinate ER (TOPROL-XL) 50 MG 24 hr tablet Take 50 mg by mouth every morning. 9/12/2024 at AM    omeprazole (PRILOSEC) 20 MG DR capsule Take 20 mg by mouth daily as needed (heartburn) Unknown at PRN    rosuvastatin (CRESTOR)  10 MG tablet Take 10 mg by mouth every other day. In the evening. 9/12/2024 at PM

## 2024-09-13 NOTE — PLAN OF CARE
PRIMARY DIAGNOSIS: GENERALIZED WEAKNESS    OUTPATIENT/OBSERVATION GOALS TO BE MET BEFORE DISCHARGE  1. Orthostatic performed: No    2. Tolerating PO medications: N/A    3. Return to near baseline physical activity: Yes    4. Cleared for discharge by consultants (if involved): No    Discharge Planner Nurse   Safe discharge environment identified: No  Barriers to discharge: Yes       Entered by: Birgit Thomas RN 09/13/2024 2:30 AM     Please review provider order for any additional goals.   Nurse to notify provider when observation goals have been met and patient is ready for discharge.Goal Outcome Evaluation:

## 2024-09-13 NOTE — CONSULTS
Care Management Initial Consult    General Information  Assessment completed with: Patient,    Type of CM/SW Visit: Initial Assessment    Primary Care Provider verified and updated as needed: Yes   Readmission within the last 30 days: no previous admission in last 30 days      Reason for Consult: discharge planning  Advance Care Planning: Advance Care Planning Reviewed: no concerns identified          Communication Assessment  Patient's communication style: spoken language (English or Bilingual)    Hearing Difficulty or Deaf: no        Cognitive  Cognitive/Neuro/Behavioral: .WDL except, orientation  Level of Consciousness: intermittent confusion  Arousal Level: opens eyes spontaneously  Orientation: disoriented to, time, place  Mood/Behavior: calm, cooperative  Best Language: 0 - No aphasia  Speech: clear    Living Environment:   People in home: spouse     Current living Arrangements: house      Able to return to prior arrangements: yes       Family/Social Support:  Care provided by: self  Provides care for: no one  Marital Status:   Support system: Wife          Description of Support System: Supportive    Support Assessment: Adequate family and caregiver support    Current Resources:   Patient receiving home care services: No        Community Resources: None  Equipment currently used at home: cane, straight, walker, rolling  Supplies currently used at home: None    Employment/Financial:  Employment Status: retired        Financial Concerns: none   Referral to Financial Worker: No       Does the patient's insurance plan have a 3 day qualifying hospital stay waiver?  Yes     Which insurance plan 3 day waiver is available? Alternative insurance waiver    Will the waiver be used for post-acute placement? Undetermined at this time    Lifestyle & Psychosocial Needs:  Social Determinants of Health     Food Insecurity: Not on file   Depression: Not at risk (5/18/2023)    Received from [employer]    PHQ-2      "PHQ-2 Score: 0   Housing Stability: Not on file   Tobacco Use: Medium Risk (8/8/2024)    Received from [employer]    Patient History     Smoking Tobacco Use: Former     Smokeless Tobacco Use: Never     Passive Exposure: Never   Financial Resource Strain: Not on file   Alcohol Use: Not on file   Transportation Needs: Not on file   Physical Activity: Not on file   Interpersonal Safety: Not on file   Stress: Not on file   Social Connections: Not on file   Health Literacy: Not on file       Functional Status:  Prior to admission patient needed assistance:   Dependent ADLs:: Independent  Dependent IADLs:: Transportation  Assesssment of Functional Status: Not at baseline with ADL Functioning    Mental Health Status:  Mental Health Status: No Current Concerns       Chemical Dependency Status:  Chemical Dependency Status: No Current Concerns             Values/Beliefs:  Spiritual, Cultural Beliefs, [employer] Practices, Values that affect care:                 Discussed  Partnership in Safe Discharge Planning  document with patient/family: Yes: spouse    Additional Information:  SW met with spouse to introduce role of CM, complete  initial assessment, and to discuss needs at time of d/c. Pt comes from home; lives with spouse, and noted to be independent at baseline but increasingly become more weak. Therapy consulted and is anticipated to recommend TCU; pt has been to [facility] in the past and ok with referral being sent there as well as requesting referral to be sent to [facility] (agreeable to private room fee).Pt feels that there will be no needs from CM at discharge, and will follow with PCP if needs arise post discharge.    CM to continue to follow through hospitalization and for recommendations.   12:16 PM    1:1 discontinued at 1200 today. [facility] stated that they will \"review once 1:1 has been DC'd for 48 hours or greater.\" [facility] stated that they are able to accept on Monday if remains off 1:1. Bed " secured.  12:19 PM    Next Steps: placement, PAS and transportation.    Brenna Kjellberg, BSW

## 2024-09-13 NOTE — ED NOTES
"[facility] ED Handoff Report    ED Chief Complaint: Generalized Weakness    ED Diagnosis:  (R53.1) General weakness       PMH:  History reviewed. No pertinent past medical history.     Code Status:  Prior     Falls Risk: Yes Band: Applied    Current Living Situation/Residence: lives with a significant other     Continent: No    Activity Level: total assist    Patients Preferred Language:  English     Needed: No    Vital Signs:  BP (!) 164/72   Pulse 76   Temp 99.4  F (37.4  C) (Oral)   Resp 21   Ht 1.651 m (5' 5\")   Wt 81.6 kg (180 lb)   SpO2 91%   BMI 29.95 kg/m         Pain Score: 0/10    Is the Patient Confused:  No    Last Food or Drink: 09/12/24 at 1930    Focused Assessment:  Per triage: Patient presents to ED for weakness that started when he woke up this morning.  Complained initially more right sided weakness, but also states some weakness to left side.  Provider called for neuro assessment.  Wife states history of seizures.  Following neuro assessment, generalized weakness noted.     Pt denies other s/s, denying chest pain, SOB, lightheadedness, dizziness, and any other pain. His daughter reports weakness has occurred for months, but increased today. Scans negative.    Tests Performed: Done: Labs and Imaging    Treatments Provided:  Fluids    Family Dynamics/Concerns: No    Family Updated On Visitor Policy: Yes    Plan of Care Communicated to Family: Yes    Who Was Updated about Plan of Care: Patient and daughter    Belongings Checklist Done and Signed by Patient: Yes    Belongings Sent with Patient: Yes    Medications sent with patient: None    Covid: symptomatic, positive      RN: Urszula Lee RN   9/12/2024 8:38 PM       "

## 2024-09-13 NOTE — PROGRESS NOTES
"Patient pulled out IV. Aid was not in room nor did they stop bleeding or clean patient up. \" I just got here..not my fault.\" Author stopped bleeding and cleaned patient up at arm. Asked new one to one to please change patient gown and bedding. Provider informed.   "

## 2024-09-13 NOTE — PROGRESS NOTES
[facility]    Medicine Progress Note - Hospitalist Service    Date of Admission:  9/12/2024    Assessment & Plan      Andrew Haines is a 83 year old male with hx paroxysmal atrial fibrillation, CVA, seizure disorder, MCI, OA, Parkinsonism with recent negative DAPT test for Parksinsons disease cerebral ventriculomegaly who is admitted on 9/12/2024 with COVID-19 infection,increasing weakness, and acute on chronic encephalopathy    # Confirmed COVID-19 infection    # Acute Hypoxic Respiratory Failure secondary to COVID-19 infection     Symptom Onset 9/12/2024   Date of 1st Positive Test 9/12/2004   Vaccination Status Fully Vaccinated       - COVID-19 special precautions, continuous pulse-ox  - Oxygen: continue current support with nasal cannula at 2 L/min; titrate to keep SpO2 between 90-96%  - Labs: CBC with diff, CMP,  D-dimer, CRP  - Imaging: no additional imaging needed at this time - CXR negative  - Breathing treatments: no inhalers needed; avoid nebulizers in favor of MDIs   - IV fluids: not indicated at this time  - Antibiotics: not indicated   - COVID-Focused Medications: - with hypoxia resolved and risk of more encephalopathy will discontinue Dexamethasone   --continue Remdesivir, with no infiltrate on imaging change to 3 days hospital discharge, started on 9/12/24  - DVT Prophylaxis:         - add lovenox.  --stopped continuous pulse ox as it made him agitated  - spot check    # Encephalopathy-   ---head CT negative  ---suspect toxic from COVID  ---has hx epilepsy and MCI  ---on 1:1 - stopped today 9/13/2024  ---add encephalopathy order set    # Generalized weakness-   ---recent TCU stay  ---also contributing SARS-CoV-2 infection.    ---actvity as tolerated.  ---PT/OT rec TCU    #Atrial fibrillation  #Complete heart blc  ---continue asa and toprol with hold parameters    #Epilepsy  --- Follows with [facility] neurology, notes reviewed  --- Reviewed with wife Dr. Segundo's  "review of DAPT test indicating no Parkinson's disease  ---was at [facility] in July 2024 with sx  --- On both depakote and keppra since July  ---continue both    #GERD--PPI    #hyperlipidemia - statin           Observation Goals: -diagnostic tests and consults completed and resulted, -vital signs normal or at patient baseline, -dyspnea improved and O2 sats greater than 88% on room air or prior home oxygen levels, -returns to baseline functional status, -safe disposition plan has been identified, Nurse to notify provider when observation goals have been met and patient is ready for discharge.  Diet: Combination Diet Low Saturated Fat Na <2400mg Diet, No Caffeine Diet    DVT Prophylaxis: Enoxaparin (Lovenox) SQ  Gaviria Catheter: Not present  Lines: None     Cardiac Monitoring: ACTIVE order. Indication: hypoxia, covid +  Code Status:  full code    Clinically Significant Risk Factors Present on Admission              # Hypoalbuminemia: Lowest albumin = 3.1 g/dL at 9/13/2024  5:59 AM, will monitor as appropriate  # Coagulation Defect: INR = 1.18 (Ref range: 0.85 - 1.15) and/or PTT = 25 Seconds (Ref range: 22 - 38 Seconds), will monitor for bleeding  # Drug Induced Platelet Defect: home medication list includes an antiplatelet medication   # Hypertension: Noted on problem list         # Overweight: Estimated body mass index is 29.95 kg/m  as calculated from the following:    Height as of this encounter: 1.651 m (5' 5\").    Weight as of this encounter: 81.6 kg (180 lb).       # Financial/Environmental Concerns:                 Disposition Plan     Medically Ready for Discharge: Anticipated Tomorrow             Vianney Live MD  Hospitalist Service  [facility]  Securely message with [employer] (more info)  Text page via [employer] Paging/Directory   ______________________________________________________________________    Interval History   ---Patient seen with wife in room  --Per wife acute worsening weakness " "yesterday am that she noted only able to use walker  ---at [facility] all day having DAPT test (see above)  ---wife unable to get him out of car when she brought him home from hospital so brought him here  ---tells me he is \"terrific\"    PMH;   Known MCI  Recent hospitalization for possible increase sz 7/2024     Physical Exam   Vital Signs: Temp: 98.1  F (36.7  C) Temp src: Oral BP: 129/73 Pulse: 68   Resp: 20 SpO2: 99 % O2 Device: None (Room air)    Weight: 180 lbs 0 oz    General Appearance: Pleasant male, falling asleep a little but awakes appropriately  Respiratory: CTA-B  Cardiovascular: rrr, no murmers  GI: soft, obese, nontender, no masses  Skin: no significant LE edema with brace on right leg  Other: Neuro - no obvious tremors when I see him and he is calm and cooperative     Medical Decision Making             Data     I have personally reviewed the following data over the past 24 hrs:    5.8  \   11.8 (L)   / 101 (L)     135 102 16.0 /  96   4.3 26 0.98 \     ALT: 53 AST: 26 AP: 40 TBILI: 0.5   ALB: 3.1 (L) TOT PROTEIN: 5.8 (L) LIPASE: N/A     Trop: 17 BNP: N/A     Procal: N/A CRP: 32.90 (H) Lactic Acid: N/A       INR:  1.18 (H) PTT:  25   D-dimer:  1.99 (H) Fibrinogen:  N/A       Imaging results reviewed over the past 24 hrs:   Recent Results (from the past 24 hour(s))   NM DaTscan Brain Spect    Narrative    EXAM: NM DATSCAN BRAIN SPECT  LOCATION: [facility]  DATE: 9/12/2024    INDICATION: Parkinsonism, Localization-related (focal) (partial) s, Other abnormalities of gait and mobility, Other specified disorders of brain, Mild cognitive impairment of uncertain o, Other symptoms and signs involving cogni, Paroxysmal atrial fibrillation (HRC), Bradycardia, unspecified, Other symptoms an   COMPARISON: CT 7/27/2024  TECHNIQUE: IOFLUPANE I 123 185 MBQ/2.5ML IV SOLN 4.4 millicurie, IV. 3 hour delay SPECT brain imaging.    FINDINGS: Normal symmetric radiotracer uptake in the caudate nuclei and putamina " bilaterally.    IMPRESSION:    Normal DaTscan.   Head CT w/o contrast    Narrative    EXAM: CT HEAD W/O CONTRAST  LOCATION: [facility]  DATE: 9/12/2024    INDICATION: Sudden onset generalized confusion  COMPARISON: CT head July 27, 2024. MRI brain July 9, 2024.  TECHNIQUE: Routine CT Head without IV contrast. Multiplanar reformats. Dose reduction techniques were used.    FINDINGS:  INTRACRANIAL CONTENTS: No intracranial hemorrhage, extraaxial collection, or mass effect.  No CT evidence of acute infarct. Severe presumed chronic small vessel ischemic changes. Left cerebellar hemisphere encephalomalacia from prior infarct. Small right   cerebellar hemisphere remote infarcts. Moderate generalized volume loss. No hydrocephalus. Flattening of the pituitary gland against the floor of the expanded sella. This is most commonly incidental but can be seen in the setting of intracranial   hypertension.    VISUALIZED ORBITS/SINUSES/MASTOIDS: Prior bilateral cataract surgery. Visualized portions of the orbits are otherwise unremarkable. No paranasal sinus mucosal disease. No middle ear or mastoid effusion.    BONES/SOFT TISSUES: No acute abnormality.      Impression    IMPRESSION:  1.  No acute intracranial process.  2.  Severe sequela of chronic ischemic small vessel disease.  3.  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   Chest XR,  PA & LAT    Narrative    EXAM: XR CHEST 2 VIEWS  LOCATION: [facility]  DATE: 9/12/2024    INDICATION: Shortness of breath  COMPARISON: None.      Impression    IMPRESSION: Heart size and vascularity are within normal limits for technique. Cardiac leads project over the RA and RV. No focal infiltrates. No pleural effusions. Mild degenerative changes in the thoracic spine.

## 2024-09-13 NOTE — PROGRESS NOTES
PRIMARY DIAGNOSIS: GENERALIZED WEAKNESS    OUTPATIENT/OBSERVATION GOALS TO BE MET BEFORE DISCHARGE  1. Orthostatic performed: No    2. Tolerating PO medications: No    3. Return to near baseline physical activity: No    4. Cleared for discharge by consultants (if involved): No    Discharge Planner Nurse   Safe discharge environment identified: No  Barriers to discharge: Yes       Entered by: Tamar Jones RN 09/13/2024 1:15 PM

## 2024-09-13 NOTE — H&P
[facility]    History and Physical - Hospitalist Service       Date of Admission:  9/12/2024    Assessment & Plan      Andrew Haines is a 83 year old male with PMH significant for hypertension paroxysmal atrial fibrillation, CVA, seizure disorder, MR, OA, carotid artery stenosis, cerebral ventriculomegaly and Parkinson's disease who is admitted on 9/12/2024 with COVID-19 infection, acute hypoxemic respiratory failure, generalized weakness, altered mental status.  # Confirmed COVID-19 infection    # Acute Hypoxic Respiratory Failure secondary to COVID-19 infection     Symptom Onset 9/12/2024   Date of 1st Positive Test Use date of first positive test.   Vaccination Status Fully Vaccinated       - COVID-19 special precautions, continuous pulse-ox  - Oxygen: continue current support with nasal cannula at 2 L/min; titrate to keep SpO2 between 90-96%  - Labs: Standard COVID admission labs ordered (CBC with diff, CMP, INR, D-dimer, CRP).   - Imaging: no additional imaging needed at this time  - Breathing treatments: no inhalers needed; avoid nebulizers in favor of MDIs   - IV fluids: not indicated at this time  - Antibiotics: not indicated   - COVID-Focused Medications: Dexamethasone 6 mg x 10 days or until hospital discharge, started on 9/12/24 and Remdesivir x 5 days or until hospital discharge, started on 9/12/24  - DVT Prophylaxis:         - At high risk of thrombotic complications due to COVID-19 (DDimer = N/A )         -  Off anticoagulation since closed head injury 2 months ago.    # Altered mental status- Possible 2/2 SARS-CoV-2 infection vs underlying dementia.  Supportive care.    # Generalized weakness- 2/2 SARS-CoV-2 infection.  Bedrest.        Diet: NPO for Medical/Clinical Reasons Except for: Meds    DVT Prophylaxis: Pneumatic Compression Devices  Gaviria Catheter: Not present  Lines: None     Cardiac Monitoring: None  Code Status:  Unable to obtain due to confusion.  Previously  "full code.    Clinically Significant Risk Factors Present on Admission                # Drug Induced Platelet Defect: home medication list includes an antiplatelet medication   # Hypertension: Noted on problem list         # Overweight: Estimated body mass index is 29.95 kg/m  as calculated from the following:    Height as of this encounter: 1.651 m (5' 5\").    Weight as of this encounter: 81.6 kg (180 lb).       # Financial/Environmental Concerns:                 Disposition Plan   Anticipate less than 2 midnight hospital stay.         Cassidy Mayer MD  Hospitalist Service  [facility]  Securely message with [employer] (more info)  Text page via [facility] Paging/Directory     ______________________________________________________________________    Chief Complaint   Generalized weakness, confusion    History is obtained from the patient and chart review.  Patient is a poor historian due to his confusion and does not know that he is in the hospital nor why he is here.    History of Present Illness   Andrew Haines is a 83 year old male with PMH significant for hypertension paroxysmal atrial fibrillation, CVA, seizure disorder, MR, OA, carotid artery stenosis, cerebral ventriculomegaly and Parkinson's disease who presents to ED due to generalized weakness and altered mental status.  Patient is a poor historian due to his confusion.  Per chart review family noted mild confusion this morning and weakness.  Per chart review, patient had intermittent confusion during hospitalization 2 months ago.  ED workup revealed COVID-19 positive.  Patient had transient hypoxia down to 91%.  Patient currently denies shortness of breath, chest pains, coughing, wheezing, fever, chills, nausea, vomiting, diarrhea.  Patient appears in no acute distress.      Past Medical History    Hypertension  Parkinson's disease    Past Surgical History   History reviewed. No pertinent surgical history.    Prior to Admission " Medications   Prior to Admission Medications   Prescriptions Last Dose Informant Patient Reported? Taking?   Calcium Carb-Cholecalciferol (CALCIUM-VITAMIN D3) 600-400 MG-UNIT CAPS  Spouse/Significant Other Yes No   Sig: Take 2 tablets by mouth daily   aspirin (ASA) 325 MG tablet  Spouse/Significant Other Yes No   Sig: Take 1 tablet by mouth daily   divalproex sodium delayed-release (DEPAKOTE) 500 MG DR tablet   No No   Sig: Take 1 tablet (500 mg) by mouth every 12 hours   hydrOXYzine HCl (ATARAX) 25 MG tablet   No No   Sig: Take 1 tablet (25 mg) by mouth nightly as needed for other or itching (adjuvant pain)   levETIRAcetam (KEPPRA) 1000 MG tablet  Spouse/Significant Other Yes No   Sig: Take 1,000 mg by mouth 2 times daily   metoprolol succinate ER (TOPROL-XL) 50 MG 24 hr tablet  Spouse/Significant Other Yes No   Sig: Take 50 mg by mouth daily   omeprazole (PRILOSEC) 20 MG DR capsule   Yes No   Sig: Take 20 mg by mouth daily as needed (heartburn)   rosuvastatin (CRESTOR) 10 MG tablet  Spouse/Significant Other Yes No   Sig: Take 10 mg by mouth daily      Facility-Administered Medications: None        Review of Systems    The 10 point Review of Systems is negative other than noted in the HPI.    Social History   I have reviewed this patient's social history and updated it with pertinent information if needed.         Family History   I have reviewed this patient's family history and updated it with pertinent information if needed.  Family History   Problem Relation Age of Onset    Cerebrovascular Disease Father         Physical Exam   Vital Signs: Temp: 99.4  F (37.4  C) Temp src: Oral BP: (!) 145/74 Pulse: 75   Resp: 30 SpO2: 91 % O2 Device: None (Room air)    Weight: 180 lbs 0 oz    General Appearance: AAO x 1 (name), no acute distress  Respiratory: [place] effort, clear, diminished breath sounds in bases bilaterally  Cardiovascular: Regular rate, S1-S2  GI: +BS, soft, nontender, nondistended  Skin: No  cyanosis, no jaundice  Other: No pedal edema    Medical Decision Making       45 MINUTES SPENT BY ME on the date of service doing chart review, history, exam, documentation & further activities per the note.      Data     I have personally reviewed the following data over the past 24 hrs:    8.1  \   14.1   / 126 (L)     135 100 21.4 /  106 (H)   4.6 24 1.00 \     ALT: 76 (H) AST: 36 AP: 49 TBILI: 0.9   ALB: 4.0 TOT PROTEIN: 7.5 LIPASE: N/A     Trop: 17 BNP: N/A     INR:  1.05 PTT:  25   D-dimer:  N/A Fibrinogen:  N/A       Imaging results reviewed over the past 24 hrs:   Recent Results (from the past 24 hour(s))   NM DaTscan Brain Spect    Narrative    EXAM: NM DATSCAN BRAIN SPECT  LOCATION: [facility]  DATE: 9/12/2024    INDICATION: Parkinsonism, Localization-related (focal) (partial) s, Other abnormalities of gait and mobility, Other specified disorders of brain, Mild cognitive impairment of uncertain o, Other symptoms and signs involving cogni, Paroxysmal atrial fibrillation (HRC), Bradycardia, unspecified, Other symptoms an   COMPARISON: CT 7/27/2024  TECHNIQUE: IOFLUPANE I 123 185 MBQ/2.5ML IV SOLN 4.4 millicurie, IV. 3 hour delay SPECT brain imaging.    FINDINGS: Normal symmetric radiotracer uptake in the caudate nuclei and putamina bilaterally.    IMPRESSION:    Normal DaTscan.   Head CT w/o contrast    Narrative    EXAM: CT HEAD W/O CONTRAST  LOCATION: [facility]  DATE: 9/12/2024    INDICATION: Sudden onset generalized confusion  COMPARISON: CT head July 27, 2024. MRI brain July 9, 2024.  TECHNIQUE: Routine CT Head without IV contrast. Multiplanar reformats. Dose reduction techniques were used.    FINDINGS:  INTRACRANIAL CONTENTS: No intracranial hemorrhage, extraaxial collection, or mass effect.  No CT evidence of acute infarct. Severe presumed chronic small vessel ischemic changes. Left cerebellar hemisphere encephalomalacia from prior infarct. Small right   cerebellar  hemisphere remote infarcts. Moderate generalized volume loss. No hydrocephalus. Flattening of the pituitary gland against the floor of the expanded sella. This is most commonly incidental but can be seen in the setting of intracranial   hypertension.    VISUALIZED ORBITS/SINUSES/MASTOIDS: Prior bilateral cataract surgery. Visualized portions of the orbits are otherwise unremarkable. No paranasal sinus mucosal disease. No middle ear or mastoid effusion.    BONES/SOFT TISSUES: No acute abnormality.      Impression    IMPRESSION:  1.  No acute intracranial process.  2.  Severe sequela of chronic ischemic small vessel disease.  3.  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   Chest XR,  PA & LAT    Narrative    EXAM: XR CHEST 2 VIEWS  LOCATION: [facility]  DATE: 9/12/2024    INDICATION: Shortness of breath  COMPARISON: None.      Impression    IMPRESSION: Heart size and vascularity are within normal limits for technique. Cardiac leads project over the RA and RV. No focal infiltrates. No pleural effusions. Mild degenerative changes in the thoracic spine.

## 2024-09-13 NOTE — PROGRESS NOTES
Pt admitted for General weakness, confused, On RA, AX2 with WGB, seizure pads in place, bed in low position and alarm on.

## 2024-09-13 NOTE — PLAN OF CARE
Problem: Adult Inpatient Plan of Care  Goal: Optimal Comfort and Wellbeing  Outcome: Progressing  Intervention: Monitor Pain and Promote Comfort  Recent Flowsheet Documentation  Taken 9/13/2024 0200 by Birgit Thomas RN  Pain Management Interventions:   rest   relaxation techniques promoted   quiet environment facilitated     Problem: Pain Acute  Goal: Optimal Pain Control and Function  Outcome: Progressing  Intervention: Develop Pain Management Plan  Recent Flowsheet Documentation  Taken 9/13/2024 0200 by Birgit Thomas RN  Pain Management Interventions:   rest   relaxation techniques promoted   quiet environment facilitated  Intervention: Prevent or Manage Pain  Recent Flowsheet Documentation  Taken 9/13/2024 0443 by Birgit Thomas RN  Medication Review/Management: medications reviewed  Taken 9/13/2024 0201 by Birgit Thomas RN  Medication Review/Management: medications reviewed     Problem: Adult Inpatient Plan of Care  Goal: Absence of Hospital-Acquired Illness or Injury  Intervention: Prevent Skin Injury  Recent Flowsheet Documentation  Taken 9/13/2024 0443 by Birigt Thomas RN  Body Position: position changed independently  Taken 9/13/2024 0201 by Birgit Thomas RN  Body Position: position changed independently   Goal Outcome Evaluation:       Patient admitted for generalized weakness. Intermittent confusion noted during the night. Denies pain. Covid-19 positive. Intermittent dry cough. Left PIV saline locked. Low sat fat diet; Na < 2400 mg. Vitally stable. RA. Brief changed. Assist of 2.

## 2024-09-14 LAB
CRP SERPL-MCNC: 59.1 MG/L
ERYTHROCYTE [DISTWIDTH] IN BLOOD BY AUTOMATED COUNT: 14.8 % (ref 10–15)
HCT VFR BLD AUTO: 35.2 % (ref 40–53)
HGB BLD-MCNC: 11.7 G/DL (ref 13.3–17.7)
MCH RBC QN AUTO: 32.1 PG (ref 26.5–33)
MCHC RBC AUTO-ENTMCNC: 33.2 G/DL (ref 31.5–36.5)
MCV RBC AUTO: 97 FL (ref 78–100)
PLATELET # BLD AUTO: 105 10E3/UL (ref 150–450)
RBC # BLD AUTO: 3.64 10E6/UL (ref 4.4–5.9)
WBC # BLD AUTO: 4 10E3/UL (ref 4–11)

## 2024-09-14 PROCEDURE — 86140 C-REACTIVE PROTEIN: CPT | Performed by: FAMILY MEDICINE

## 2024-09-14 PROCEDURE — 258N000003 HC RX IP 258 OP 636: Performed by: FAMILY MEDICINE

## 2024-09-14 PROCEDURE — 96372 THER/PROPH/DIAG INJ SC/IM: CPT | Performed by: FAMILY MEDICINE

## 2024-09-14 PROCEDURE — 36415 COLL VENOUS BLD VENIPUNCTURE: CPT | Performed by: FAMILY MEDICINE

## 2024-09-14 PROCEDURE — 96376 TX/PRO/DX INJ SAME DRUG ADON: CPT

## 2024-09-14 PROCEDURE — G0378 HOSPITAL OBSERVATION PER HR: HCPCS

## 2024-09-14 PROCEDURE — 99232 SBSQ HOSP IP/OBS MODERATE 35: CPT | Performed by: HOSPITALIST

## 2024-09-14 PROCEDURE — 250N000011 HC RX IP 250 OP 636: Performed by: FAMILY MEDICINE

## 2024-09-14 PROCEDURE — 250N000013 HC RX MED GY IP 250 OP 250 PS 637: Performed by: FAMILY MEDICINE

## 2024-09-14 PROCEDURE — 85014 HEMATOCRIT: CPT | Performed by: FAMILY MEDICINE

## 2024-09-14 RX ADMIN — LEVETIRACETAM 1000 MG: 500 TABLET, FILM COATED ORAL at 20:42

## 2024-09-14 RX ADMIN — METOPROLOL SUCCINATE 50 MG: 50 TABLET, EXTENDED RELEASE ORAL at 08:18

## 2024-09-14 RX ADMIN — ASPIRIN 325 MG ORAL TABLET 325 MG: 325 PILL ORAL at 08:18

## 2024-09-14 RX ADMIN — ENOXAPARIN SODIUM 40 MG: 40 INJECTION SUBCUTANEOUS at 08:24

## 2024-09-14 RX ADMIN — ROSUVASTATIN 10 MG: 10 TABLET, FILM COATED ORAL at 08:16

## 2024-09-14 RX ADMIN — SODIUM CHLORIDE 50 ML: 9 INJECTION, SOLUTION INTRAVENOUS at 00:00

## 2024-09-14 RX ADMIN — SODIUM CHLORIDE 50 ML: 9 INJECTION, SOLUTION INTRAVENOUS at 20:48

## 2024-09-14 RX ADMIN — ENOXAPARIN SODIUM 40 MG: 40 INJECTION SUBCUTANEOUS at 20:43

## 2024-09-14 RX ADMIN — LEVETIRACETAM 1000 MG: 500 TABLET, FILM COATED ORAL at 08:16

## 2024-09-14 RX ADMIN — REMDESIVIR 100 MG: 100 INJECTION, POWDER, LYOPHILIZED, FOR SOLUTION INTRAVENOUS at 20:45

## 2024-09-14 RX ADMIN — DIVALPROEX SODIUM 250 MG: 250 TABLET, DELAYED RELEASE ORAL at 08:19

## 2024-09-14 RX ADMIN — DIVALPROEX SODIUM 250 MG: 250 TABLET, DELAYED RELEASE ORAL at 20:42

## 2024-09-14 ASSESSMENT — ACTIVITIES OF DAILY LIVING (ADL)
ADLS_ACUITY_SCORE: 45
ADLS_ACUITY_SCORE: 44
ADLS_ACUITY_SCORE: 45
ADLS_ACUITY_SCORE: 44
ADLS_ACUITY_SCORE: 45
ADLS_ACUITY_SCORE: 44
ADLS_ACUITY_SCORE: 45
ADLS_ACUITY_SCORE: 44
ADLS_ACUITY_SCORE: 45
ADLS_ACUITY_SCORE: 44
ADLS_ACUITY_SCORE: 45
ADLS_ACUITY_SCORE: 44
ADLS_ACUITY_SCORE: 45

## 2024-09-14 NOTE — PROGRESS NOTES
[facility]    Medicine Progress Note - Hospitalist Service    Date of Admission:  9/12/2024    Assessment & Plan   Andrew Haines is a 83 year old male with hx paroxysmal atrial fibrillation, CVA, seizure disorder, MCI, OA, Parkinsonism with recent negative DAPT test for Parksinsons disease cerebral ventriculomegaly who is admitted on 9/12/2024 with COVID-19 infection,increasing weakness, and acute on chronic encephalopathy. Encephalopathy improved and patient on RA. Patient on Remdesivir, dexamethasone discontinued. PT evaluated and recommend TCU discharge. Plan for TCU discharge on Monday.     # Confirmed COVID-19 infection    # Acute Hypoxic Respiratory Failure secondary to COVID-19 infection     Symptom Onset 9/12/2024   Date of 1st Positive Test 9/12/2004   Vaccination Status Fully Vaccinated       - COVID-19 special precautions, spot check pulse-ox due to mentation   - Oxygen: currently on RA. Keep SpO2 between 90-96% with supplementation oxygen as indicated  - Labs: CBC with diff, CMP,  D-dimer, CRP  - Imaging: no additional imaging needed at this time - CXR negative  - Breathing treatments: no inhalers needed; avoid nebulizers in favor of MDIs   - IV fluids: not indicated at this time  - Antibiotics: not indicated   - COVID-Focused Medications: - with hypoxia resolved and risk of more encephalopathy will discontinue Dexamethasone   --continue Remdesivir, with no infiltrate on imaging changed to 3 days or hospital discharge, started on 9/12/24  - DVT Prophylaxis:         - add lovenox.       # Encephalopathy-resolving   ---head CT negative  ---suspect toxic from COVID  ---has hx epilepsy and MCI  ---initially on 1:1 - discontinued 9/13/2024  ---encephalopathy order set    # Generalized weakness-   ---recent TCU stay  ---also contributing SARS-CoV-2 infection.    ---actvity as tolerated.  ---PT/OT rec TCU but spouse would prefer he goes home. Will have PT work with him during  "admission. Currently scheduled to be discharged to TCU on Monday.     #Atrial fibrillation  #Complete heart block  ---continue asa and toprol with hold parameters  ---not on AC per cardiology outpatient notes and only on ASA. Used to be on Xarelto.     #Epilepsy  --- Follows with [facility] neurology, notes reviewed  --- Reviewed with wife  Ratna's review of DAPT test indicating no Parkinson's disease  ---was at [facility] in July 2024 with sx  --- On both depakote and keppra since July  ---continue both    #GERD--PPI    #hyperlipidemia - statin           Observation Goals: -diagnostic tests and consults completed and resulted, -vital signs normal or at patient baseline, -dyspnea improved and O2 sats greater than 88% on room air or prior home oxygen levels, -returns to baseline functional status, -safe disposition plan has been identified, Nurse to notify provider when observation goals have been met and patient is ready for discharge.  Diet: Combination Diet Low Saturated Fat Na <2400mg Diet, No Caffeine Diet    DVT Prophylaxis: Enoxaparin (Lovenox) SQ  Gaviria Catheter: Not present  Lines: None     Cardiac Monitoring: None  Code Status: Full Code    Clinically Significant Risk Factors Present on Admission              # Hypoalbuminemia: Lowest albumin = 3.1 g/dL at 9/13/2024  5:59 AM, will monitor as appropriate    # Coagulation Defect: INR = 1.18 (Ref range: 0.85 - 1.15) and/or PTT = 25 Seconds (Ref range: 22 - 38 Seconds), will monitor for bleeding  # Drug Induced Platelet Defect: home medication list includes an antiplatelet medication   # Hypertension: Noted on problem list         # Overweight: Estimated body mass index is 29.95 kg/m  as calculated from the following:    Height as of this encounter: 1.651 m (5' 5\").    Weight as of this encounter: 81.6 kg (180 lb).         # Financial/Environmental Concerns: none               Disposition Plan     Medically Ready for Discharge: Anticipated Tomorrow       "       Sussy Still MD  Hospitalist Service  [facility]  Securely message with [employer] (more info)  Text page via [employer] Paging/Directory   ______________________________________________________________________    Interval History   Patient seen at bedside with spouse. Patient has been off 1:1 since yesterday. He denies any fever, chills, chest pain, cough, dyspnea, diarrhea but reports weakness. Spouse would prefer to take patient home when medically cleared if PT can reassess him.     Physical Exam   Vital Signs: Temp: 98.3  F (36.8  C) Temp src: Oral BP: 103/56 Pulse: 68   Resp: 18 SpO2: 94 % O2 Device: None (Room air)    Weight: 180 lbs 0 oz    Constitutional: awake, alert, cooperative, no apparent distress, and appears stated age  Eyes: pupils equal, round and reactive to light and sclera clear  ENT: atraumatic, oral pharynx with moist mucus membranes  Respiratory: No increased work of breathing, good air exchange, clear to auscultation bilaterally, no crackles or wheezing  Cardiovascular: regular rate and rhythm and normal S1 and S2  GI: soft, non-distended, and non-tender  Musculoskeletal: full range of motion noted    Medical Decision Making       49 MINUTES SPENT BY ME on the date of service doing chart review, history, exam, documentation & further activities per the note.      Data     I have personally reviewed the following data over the past 24 hrs:    4.0  \   11.7 (L)   / 105 (L)     N/A N/A N/A /  N/A   N/A N/A N/A \     Procal: N/A CRP: 59.10 (H) Lactic Acid: N/A

## 2024-09-14 NOTE — PLAN OF CARE
Goal Outcome Evaluation:      Plan of Care Reviewed With: patient    Overall Patient Progress: no change    Patient no longer has his one to one. Wife has been bedside most of the day. Patient has IV meds scheduled, patient will need new IV - swat consulted as patient pulled out left PIV this morning. Patient has been using the urinal and has had changed briefs. Patient is on input and output orders. Patient is on Room Air. Fluids are encouraged for patient. Patient is a heavy assist of 2 for mobility. Tele has been discontinued. Patient is covid + and isolation precautions are in place.

## 2024-09-14 NOTE — PROGRESS NOTES
Care Management Follow Up    Length of Stay (days): 0    Expected Discharge Date: 09/16/2024    Anticipated Discharge Plan:  TBD    Transportation: TBD    PT Recommendations: Transitional Care Facility  OT Recommendations:        Barriers to Discharge: placement    Prior Living Situation: house with spouse    Discussed  Partnership in Safe Discharge Planning  document with patient/family: No     Handoff Completed: No, handoff not indicated or clinically appropriate    Patient/Spokesperson Updated: Yes. Who? Wife    Additional Information:  Therapy recommendation is TCU, and [facility] can take the pt Monday as long as pt remains off the 1:1.     Provider informed CM that she discussed with pt and wife and they feel pt is moving better and would like therapy to come and re-assess. CM called therapy to have them come out do re-assess as pt had a positive change in mobility and is medically ready to discharge, But according to therapy they cannot get out today to re-assess and will put the pt on for tomorrow. Pt and wife have been updated on this. Wife also told CM that if therapy recommendation remains TCU they would like him to go to [facility]. We discussed transport and she told CM that it will depend on how pt is moving.    Next Steps: therapy re-assessment.    Keyonna Calvert, RN

## 2024-09-14 NOTE — PROGRESS NOTES
PRIMARY DIAGNOSIS: GENERALIZED WEAKNESS    OUTPATIENT/OBSERVATION GOALS TO BE MET BEFORE DISCHARGE  1. Orthostatic performed: No    2. Tolerating PO medications: Yes    3. Return to near baseline physical activity: No    4. Cleared for discharge by consultants (if involved): No    Discharge Planner Nurse   Safe discharge environment identified: No  Barriers to discharge: Yes       Entered by: Tamar Jones RN 09/14/2024 11:03 AM     Patient is on Room Air. Patient is much more oriented today. Thought he was at [facility] not at [facility] and thought it was January not September. But did know why he was in the hosptial today. Patient remains an assist of 2 for mobility.

## 2024-09-14 NOTE — PROGRESS NOTES
PRIMARY DIAGNOSIS: GENERALIZED WEAKNESS    OUTPATIENT/OBSERVATION GOALS TO BE MET BEFORE DISCHARGE  1. Orthostatic performed: No    2. Tolerating PO medications: Yes    3. Return to near baseline physical activity: No    4. Cleared for discharge by consultants (if involved): No    Discharge Planner Nurse   Safe discharge environment identified: No  Barriers to discharge: Yes       Entered by: Tamar Jones RN 09/13/2024 7:53 PM   Patient is COVID +. Patient still has no one to one at bedside. Patient has no IV access -provider aware.

## 2024-09-14 NOTE — PLAN OF CARE
Problem: Adult Inpatient Plan of Care  Goal: Plan of Care Review  Description: The Plan of Care Review/Shift note should be completed every shift.  The Outcome Evaluation is a brief statement about your assessment that the patient is improving, declining, or no change.  This information will be displayed automatically on your shift  note.  Outcome: Progressing  Flowsheets (Taken 9/14/2024 0442)  Outcome Evaluation: pt on RA with saturations in the 90's.  Plan of Care Reviewed With: patient  Overall Patient Progress: no change  Goal: Absence of Hospital-Acquired Illness or Injury  Outcome: Progressing  Intervention: Prevent Skin Injury  Recent Flowsheet Documentation  Taken 9/14/2024 0332 by Chester Reagan RN  Body Position: position changed independently  Taken 9/14/2024 0002 by Chester Reagan RN  Body Position: position changed independently  Intervention: Prevent and Manage VTE (Venous Thromboembolism) Risk  Recent Flowsheet Documentation  Taken 9/14/2024 0332 by Chester Reagan RN  VTE Prevention/Management: SCDs off (sequential compression devices)  Taken 9/14/2024 0002 by Chester Reagan RN  VTE Prevention/Management: SCDs off (sequential compression devices)  Goal: Optimal Comfort and Wellbeing  Outcome: Progressing     Problem: Pain Acute  Goal: Optimal Pain Control and Function  Outcome: Progressing   Goal Outcome Evaluation: pt is alert and oriented, Denies pain or discomfort. RA. PIV placed and IV medication given.       Plan of Care Reviewed With: patient    Overall Patient Progress: no changeOverall Patient Progress: no change    Outcome Evaluation: pt on RA with saturations in the 90's.

## 2024-09-14 NOTE — PROGRESS NOTES
PRIMARY DIAGNOSIS: GENERALIZED WEAKNESS    OUTPATIENT/OBSERVATION GOALS TO BE MET BEFORE DISCHARGE  1. Orthostatic performed: No    2. Tolerating PO medications: Yes    3. Return to near baseline physical activity: No - patient is now assist of 1/2 - patient moves slow and needs stediness..    4. Cleared for discharge by consultants (if involved): No    Discharge Planner Nurse   Safe discharge environment identified: No  Barriers to discharge: Yes       Entered by: Tamar Jones RN 09/14/2024 2:30 PM     .

## 2024-09-15 ENCOUNTER — APPOINTMENT (OUTPATIENT)
Dept: PHYSICAL THERAPY | Facility: HOSPITAL | Age: 84
End: 2024-09-15
Payer: COMMERCIAL

## 2024-09-15 VITALS
SYSTOLIC BLOOD PRESSURE: 132 MMHG | OXYGEN SATURATION: 97 % | DIASTOLIC BLOOD PRESSURE: 69 MMHG | TEMPERATURE: 97.8 F | RESPIRATION RATE: 18 BRPM | WEIGHT: 180 LBS | HEART RATE: 65 BPM | HEIGHT: 65 IN | BODY MASS INDEX: 29.99 KG/M2

## 2024-09-15 PROBLEM — U07.1 COVID-19: Status: ACTIVE | Noted: 2024-09-15

## 2024-09-15 LAB
ALBUMIN SERPL BCG-MCNC: 3 G/DL (ref 3.5–5.2)
ALP SERPL-CCNC: 37 U/L (ref 40–150)
ALT SERPL W P-5'-P-CCNC: 47 U/L (ref 0–70)
ANION GAP SERPL CALCULATED.3IONS-SCNC: 7 MMOL/L (ref 7–15)
AST SERPL W P-5'-P-CCNC: 26 U/L (ref 0–45)
BILIRUB SERPL-MCNC: 0.3 MG/DL
BUN SERPL-MCNC: 22.7 MG/DL (ref 8–23)
CALCIUM SERPL-MCNC: 8.1 MG/DL (ref 8.8–10.4)
CHLORIDE SERPL-SCNC: 102 MMOL/L (ref 98–107)
CREAT SERPL-MCNC: 1.04 MG/DL (ref 0.67–1.17)
EGFRCR SERPLBLD CKD-EPI 2021: 72 ML/MIN/1.73M2
ERYTHROCYTE [DISTWIDTH] IN BLOOD BY AUTOMATED COUNT: 14.6 % (ref 10–15)
GLUCOSE SERPL-MCNC: 90 MG/DL (ref 70–99)
HCO3 SERPL-SCNC: 26 MMOL/L (ref 22–29)
HCT VFR BLD AUTO: 36.6 % (ref 40–53)
HGB BLD-MCNC: 12.3 G/DL (ref 13.3–17.7)
MCH RBC QN AUTO: 31.9 PG (ref 26.5–33)
MCHC RBC AUTO-ENTMCNC: 33.6 G/DL (ref 31.5–36.5)
MCV RBC AUTO: 95 FL (ref 78–100)
PLATELET # BLD AUTO: 119 10E3/UL (ref 150–450)
POTASSIUM SERPL-SCNC: 4.6 MMOL/L (ref 3.4–5.3)
PROT SERPL-MCNC: 5.7 G/DL (ref 6.4–8.3)
RBC # BLD AUTO: 3.85 10E6/UL (ref 4.4–5.9)
SODIUM SERPL-SCNC: 135 MMOL/L (ref 135–145)
WBC # BLD AUTO: 4.3 10E3/UL (ref 4–11)

## 2024-09-15 PROCEDURE — 99239 HOSP IP/OBS DSCHRG MGMT >30: CPT | Performed by: HOSPITALIST

## 2024-09-15 PROCEDURE — 96372 THER/PROPH/DIAG INJ SC/IM: CPT | Performed by: FAMILY MEDICINE

## 2024-09-15 PROCEDURE — 250N000013 HC RX MED GY IP 250 OP 250 PS 637: Performed by: FAMILY MEDICINE

## 2024-09-15 PROCEDURE — 97116 GAIT TRAINING THERAPY: CPT | Mod: GP

## 2024-09-15 PROCEDURE — G0378 HOSPITAL OBSERVATION PER HR: HCPCS

## 2024-09-15 PROCEDURE — 97530 THERAPEUTIC ACTIVITIES: CPT | Mod: GP

## 2024-09-15 PROCEDURE — 36415 COLL VENOUS BLD VENIPUNCTURE: CPT | Performed by: FAMILY MEDICINE

## 2024-09-15 PROCEDURE — 80053 COMPREHEN METABOLIC PANEL: CPT | Performed by: HOSPITALIST

## 2024-09-15 PROCEDURE — 96376 TX/PRO/DX INJ SAME DRUG ADON: CPT

## 2024-09-15 PROCEDURE — 250N000011 HC RX IP 250 OP 636: Performed by: FAMILY MEDICINE

## 2024-09-15 PROCEDURE — 85027 COMPLETE CBC AUTOMATED: CPT | Performed by: FAMILY MEDICINE

## 2024-09-15 RX ADMIN — METOPROLOL SUCCINATE 50 MG: 50 TABLET, EXTENDED RELEASE ORAL at 08:05

## 2024-09-15 RX ADMIN — ENOXAPARIN SODIUM 40 MG: 40 INJECTION SUBCUTANEOUS at 08:08

## 2024-09-15 RX ADMIN — ASPIRIN 325 MG ORAL TABLET 325 MG: 325 PILL ORAL at 08:05

## 2024-09-15 RX ADMIN — LEVETIRACETAM 1000 MG: 500 TABLET, FILM COATED ORAL at 08:05

## 2024-09-15 RX ADMIN — DIVALPROEX SODIUM 250 MG: 250 TABLET, DELAYED RELEASE ORAL at 08:06

## 2024-09-15 ASSESSMENT — ACTIVITIES OF DAILY LIVING (ADL)
ADLS_ACUITY_SCORE: 44
ADLS_ACUITY_SCORE: 43
ADLS_ACUITY_SCORE: 44
ADLS_ACUITY_SCORE: 44
ADLS_ACUITY_SCORE: 43
ADLS_ACUITY_SCORE: 43
ADLS_ACUITY_SCORE: 44
ADLS_ACUITY_SCORE: 43
ADLS_ACUITY_SCORE: 44
ADLS_ACUITY_SCORE: 43

## 2024-09-15 NOTE — PLAN OF CARE
Goal Outcome Evaluation:      Plan of Care Reviewed With: patient    Overall Patient Progress: improving    Patient discharged to home at [facility] via Private Car.  Accompanied by spouse and staff.  Discharge instructions were reviewed with patient, opportunity offered to ask questions.    Prescriptions N/A.  Access discontinued: Yes  Care plan and education discontinued: Yes  Home meds retrieved from pharmacy: No  Belongings were sent home with patient/family:  Cash/Checkbook/Credit Card:  , Cell phone/electronics:  , Clothing: Shirt(s):  , Pants:  , and Underclothes:  , and Shoes:   .

## 2024-09-15 NOTE — PROGRESS NOTES
PRIMARY DIAGNOSIS: GENERALIZED WEAKNESS    OUTPATIENT/OBSERVATION GOALS TO BE MET BEFORE DISCHARGE  1. Orthostatic performed: No    2. Tolerating PO medications: Yes    3. Return to near baseline physical activity: No    4. Cleared for discharge by consultants (if involved): No    Discharge Planner Nurse   Safe discharge environment identified: No  Barriers to discharge: Yes       Entered by: Tamar Jones RN 09/14/2024 10:25 PM   Patient on Special precautions for Covid +. Patient wanting to roam the halls and use the [facility] bathroom. Patient asked to please let us know so the bathroom can be sanitized as it is a shared space. Patient had mask on during hallway stroll - wife at side.  Patient is more oriented now - still a bit foggy on place - but now ao3.

## 2024-09-15 NOTE — PROGRESS NOTES
Care Management Discharge Note    Discharge Date: 09/15/2024       Discharge Disposition: home with family support    Discharge Services: Home care PT/OT. Pt is open with [facility] home care     Discharge DME: None    Discharge Transportation: Wife to transport at discharge.    Private pay costs discussed: Not applicable    Does the patient's insurance plan have a 3 day qualifying hospital stay waiver?  No    PAS Confirmation Code:    Patient/family educated on Medicare website which has current facility and service quality ratings: yes    Education Provided on the Discharge Plan: Yes  Persons Notified of Discharge Plans: yes  Patient/Family in Agreement with the Plan: yes    Handoff Referral Completed: No, handoff not indicated or clinically appropriate    Additional Information:  Pt medically adequate to discharge. Therapy has changed their recommendation to home with home care. Pt is open to home care PT/OT with [facility] and would like to resume services at discharge. Wife to transport at discharge. No further CM needs at this time. CM will sign off    Keyonna Calvert RN

## 2024-09-15 NOTE — DISCHARGE SUMMARY
"[facility]  Hospitalist Discharge Summary      Date of Admission:  9/12/2024  Date of Discharge:  9/15/2024  Discharging Provider: Sussy Still MD  Discharge Service: Hospitalist Service    Discharge Diagnoses   Weakness and encephalopathy secondary to COVID-19    Clinically Significant Risk Factors     # Overweight: Estimated body mass index is 29.95 kg/m  as calculated from the following:    Height as of this encounter: 1.651 m (5' 5\").    Weight as of this encounter: 81.6 kg (180 lb).       Follow-ups Needed After Discharge   Follow-up Appointments     Follow-up and recommended labs and tests       Follow up with primary care provider, Physician No Ref-Primary, within 7   days for hospital follow- up.  No follow up labs or test are needed.            Unresulted Labs Ordered in the Past 30 Days of this Admission       No orders found from 8/13/2024 to 9/13/2024.             Discharge Disposition   Discharged to home  Condition at discharge: Stable    Hospital Course   Andrew Haines is a 83 year old male with hx paroxysmal atrial fibrillation, CVA, seizure disorder, MCI, OA, Parkinsonism with recent negative DAPT test for Parksinsons disease cerebral ventriculomegaly who is admitted on 9/12/2024 with COVID-19 infection,increasing weakness, and acute on chronic encephalopathy. Patient required oxygen on presentation and was started on Remdesivir and Dexamethasone. Dexamethasone discontinued when patient transitioned to room air. Encephalopathy resolved. Patient completed 3 doses of Remdesivir. Patient was continued on all other home medications for chronic conditions. Patient seen today and stable for discharge home with resumption of services.       Consultations This Hospital Stay   PHYSICAL THERAPY ADULT IP CONSULT  PHARMACY IP CONSULT  CARE MANAGEMENT / SOCIAL WORK IP CONSULT    Code Status   Full Code    Time Spent on this Encounter   I, Sussy Still MD, personally saw the " patient today and spent greater than 30 minutes discharging this patient.       Sussy Still MD  [facility]TEND[facility]AY  [address] [place] [id number]  Phone: [phone]  Fax: [phone]  ______________________________________________________________________    Physical Exam   Vital Signs: Temp: 97.8  F (36.6  C) Temp src: Oral BP: 132/69 Pulse: 65   Resp: 18 SpO2: 97 % O2 Device: None (Room air)    Weight: 180 lbs 0 oz  Constitutional: awake, alert, cooperative, no apparent distress, and appears stated age  Eyes: pupils equal, round and reactive to light and sclera clear  ENT: atraumatic, oral pharynx with moist mucus membranes  Respiratory: No increased work of breathing, good air exchange, clear to auscultation bilaterally, no crackles or wheezing  Cardiovascular: regular rate and rhythm and normal S1 and S2  GI: soft, non-distended, and non-tender  Musculoskeletal: no lower extremity pitting edema present  full range of motion noted       Primary Care Physician   Physician No Ref-Primary    Discharge Orders      Home Care Referral      Reason for your hospital stay    Weakness secondary to COVID 19 infection. You were started on Remdesivir and Dexamethasone. You were supplemented with oxygen but were weaned off oxygen. You were assessed with PT and noted to be stable for discharge home. Please follow up with PCP on discharge.     Follow-up and recommended labs and tests     Follow up with primary care provider, Physician No Ref-Primary, within 7 days for hospital follow- up.  No follow up labs or test are needed.     Activity    Your activity upon discharge: activity as tolerated     Diet    Follow this diet upon discharge: Current Diet:Orders Placed This Encounter      Combination Diet Low Saturated Fat Na <2400mg Diet, No Caffeine Diet       Significant Results and Procedures   Most Recent 3 CBC's:  Recent Labs   Lab Test 09/15/24  0601  09/14/24  0620 09/13/24  0559   WBC 4.3 4.0 5.8   HGB 12.3* 11.7* 11.8*   MCV 95 97 97   * 105* 101*     Most Recent 3 BMP's:  Recent Labs   Lab Test 09/15/24  0601 09/13/24  1142 09/13/24  0814 09/13/24  0559 09/12/24  1718     --   --  135 135   POTASSIUM 4.6  --   --  4.3 4.6   CHLORIDE 102  --   --  102 100   CO2 26  --   --  26 24   BUN 22.7  --   --  16.0 21.4   CR 1.04  --   --  0.98 1.00   ANIONGAP 7  --   --  7 11   NELY 8.1*  --   --  8.1* 9.0   GLC 90 96 78 92 106*     Most Recent 2 LFT's:  Recent Labs   Lab Test 09/15/24  0601 09/13/24  0559   AST 26 26   ALT 47 53   ALKPHOS 37* 40   BILITOTAL 0.3 0.5   ,   Results for orders placed or performed during the hospital encounter of 09/12/24   Head CT w/o contrast    Narrative    EXAM: CT HEAD W/O CONTRAST  LOCATION: [facility]  DATE: 9/12/2024    INDICATION: Sudden onset generalized confusion  COMPARISON: CT head July 27, 2024. MRI brain July 9, 2024.  TECHNIQUE: Routine CT Head without IV contrast. Multiplanar reformats. Dose reduction techniques were used.    FINDINGS:  INTRACRANIAL CONTENTS: No intracranial hemorrhage, extraaxial collection, or mass effect.  No CT evidence of acute infarct. Severe presumed chronic small vessel ischemic changes. Left cerebellar hemisphere encephalomalacia from prior infarct. Small right   cerebellar hemisphere remote infarcts. Moderate generalized volume loss. No hydrocephalus. Flattening of the pituitary gland against the floor of the expanded sella. This is most commonly incidental but can be seen in the setting of intracranial   hypertension.    VISUALIZED ORBITS/SINUSES/MASTOIDS: Prior bilateral cataract surgery. Visualized portions of the orbits are otherwise unremarkable. No paranasal sinus mucosal disease. No middle ear or mastoid effusion.    BONES/SOFT TISSUES: No acute abnormality.      Impression    IMPRESSION:  1.  No acute intracranial process.  2.  Severe sequela of  chronic ischemic small vessel disease.  3.  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   Chest XR,  PA & LAT    Narrative    EXAM: XR CHEST 2 VIEWS  LOCATION: [facility]  DATE: 9/12/2024    INDICATION: Shortness of breath  COMPARISON: None.      Impression    IMPRESSION: Heart size and vascularity are within normal limits for technique. Cardiac leads project over the RA and RV. No focal infiltrates. No pleural effusions. Mild degenerative changes in the thoracic spine.       Discharge Medications   Current Discharge Medication List        CONTINUE these medications which have NOT CHANGED    Details   aspirin (ASA) 325 MG tablet Take 1 tablet by mouth every morning.      divalproex sodium delayed-release (DEPAKOTE) 250 MG DR tablet Take 250 mg by mouth 2 times daily.      levETIRAcetam (KEPPRA) 1000 MG tablet Take 1,000 mg by mouth 2 times daily      metoprolol succinate ER (TOPROL-XL) 50 MG 24 hr tablet Take 50 mg by mouth every morning.      omeprazole (PRILOSEC) 20 MG DR capsule Take 20 mg by mouth daily as needed (heartburn)      rosuvastatin (CRESTOR) 10 MG tablet Take 10 mg by mouth every other day. In the evening.           Allergies   Allergies   Allergen Reactions    Penicillins Rash     Denies throat swelling or breathing difficulty  Denies throat swelling or breathing difficulty      Pollen Extract Cough

## 2024-09-15 NOTE — PLAN OF CARE
"PRIMARY DIAGNOSIS: \"GENERIC\" NURSING  OUTPATIENT/OBSERVATION GOALS TO BE MET BEFORE DISCHARGE:  ADLs back to baseline: No    Activity and level of assistance: Assist +1     Pain status: Pain free.    Return to near baseline physical activity: No     Discharge Planner Nurse   Safe discharge environment identified: Yes  Barriers to discharge: Yes       Entered by: Africa Rodriguez RN 09/15/2024 2:39 AM     Please review provider order for any additional goals.   Nurse to notify provider when observation goals have been met and patient is ready for discharge.Goal Outcome Evaluation:                        "

## 2024-09-15 NOTE — PROGRESS NOTES
Physical Therapy Discharge Summary    Reason for therapy discharge:    Discharged to home with home PT.    Progress towards therapy goal(s). See goals on Care Plan in [facility] electronic health record for goal details.  Goals partially met.  Barriers to achieving goals:   discharge from facility.    Therapy recommendation(s):    Further recommended therapy is related to documented deficits, and is necessary to maximize functional independence in order for patient to return to prior level of function.      Cate Lilly, ROOSEVELTT 9/15/2024

## 2024-09-15 NOTE — PLAN OF CARE
Problem: Adult Inpatient Plan of Care  Goal: Optimal Comfort and Wellbeing  Outcome: Progressing  Intervention: Monitor Pain and Promote Comfort  Recent Flowsheet Documentation  Taken 9/15/2024 0000 by Africa Rodriguez RN  Pain Management Interventions:   rest   relaxation techniques promoted   quiet environment facilitated     Problem: Pain Acute  Goal: Optimal Pain Control and Function  Outcome: Progressing  Intervention: Develop Pain Management Plan  Recent Flowsheet Documentation  Taken 9/15/2024 0000 by Africa Rodriguez RN  Pain Management Interventions:   rest   relaxation techniques promoted   quiet environment facilitated  Intervention: Prevent or Manage Pain  Recent Flowsheet Documentation  Taken 9/15/2024 0400 by Africa Rodriguez RN  Sensory Stimulation Regulation: television on  Sleep/Rest Enhancement: relaxation techniques promoted  Bowel Elimination Promotion: adequate fluid intake promoted  Medication Review/Management: medications reviewed  Taken 9/15/2024 0009 by Africa Rodriguez RN  Sensory Stimulation Regulation: television on  Sleep/Rest Enhancement: relaxation techniques promoted  Bowel Elimination Promotion: adequate fluid intake promoted  Medication Review/Management: medications reviewed  Intervention: Optimize Psychosocial Wellbeing  Recent Flowsheet Documentation  Taken 9/15/2024 0400 by Africa Rodriguez RN  Supportive Measures: self-care encouraged  Taken 9/15/2024 0009 by Africa Rodriguez RN  Supportive Measures: self-care encouraged   Assumed care at 2300   Admitted: Covid / Generalized weakness  Vitals: VSS, afebrile, special precaution   Neuro: A/O x 4    Cardiac:  Reg radial pulse          Respiratory:  O2 saturation > 92% on RA.   GI/:  Adequate UO via urinal LBM 9/14, Strict I/O  Diet/appetite: Low fat/ low sodium/ no caffeine  Activity:  assist +1 with walker and gait belt   Pain:  Addressed with PRN medication, pt denied pain  Skin:  No adjustment  needed, had several episode of incontinence, with complete linen change and cleaned pt's luciano-area and put new brief.    LDA's: L-PIV, SL    Plan:  Pt said wife who is RN will visit at 07:30 am. CM needs to reassess pt's mobility.

## 2024-09-15 NOTE — PLAN OF CARE
Goal Outcome Evaluation:      Plan of Care Reviewed With: patient    Overall Patient Progress: improving    Patient was in chair for part of night. Wanted to return to bed after dinner. Patient received IV antibiotics - tolerated well. Patient on Room Air. Patient now assist of 1 for mobility. Input and output continues to be monitored.

## 2024-09-15 NOTE — PROGRESS NOTES
PRIMARY DIAGNOSIS: GENERALIZED WEAKNESS    OUTPATIENT/OBSERVATION GOALS TO BE MET BEFORE DISCHARGE  1. Orthostatic performed: No    2. Tolerating PO medications: Yes    3. Return to near baseline physical activity: Yes    4. Cleared for discharge by consultants (if involved): No    Discharge Planner Nurse   Safe discharge environment identified: No  Barriers to discharge: Yes       Entered by: Tamar Jones RN 09/15/2024 10:59 AM    Patient remains Covid +. Patient is now an assist of 1 for mobility. 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

## 2024-09-15 NOTE — PROGRESS NOTES
PRIMARY DIAGNOSIS: GENERALIZED WEAKNESS    OUTPATIENT/OBSERVATION GOALS TO BE MET BEFORE DISCHARGE  1. Orthostatic performed: No    2. Tolerating PO medications: Yes    3. Return to near baseline physical activity: Yes - patient now assist of 1.     4. Cleared for discharge by consultants (if involved): No    Discharge Planner Nurse   Safe discharge environment identified: No  Barriers to discharge: Yes       Entered by: Tamar Jones RN 09/14/2024 10:30 PM     Mobility and cognition has improved.

## 2024-09-16 ENCOUNTER — PATIENT OUTREACH (OUTPATIENT)
Dept: CARE COORDINATION | Facility: CLINIC | Age: 84
End: 2024-09-16
Payer: COMMERCIAL

## 2024-09-16 NOTE — PROGRESS NOTES
Clinic Care Coordination Contact  Transitions of Care Outreach    Chief Complaint   Patient presents with    Clinic Care Coordination - [facility]       Most Recent Admission Date: 9/12/2024   Most Recent Admission Diagnosis: General weakness - R53.1  COVID-19 - U07.1     Most Recent Discharge Date: 9/15/2024   Most Recent Discharge Diagnosis: General weakness - R53.1  COVID-19 - U07.1     Transitions of Care Assessment    Discharge Assessment  How are you doing now that you are home?: feeling better  How are your symptoms? (Red Flag symptoms escalate to triage hotline per guidelines): Improved  Do you know how to contact your clinic care team if you have future questions or changes to your health status? : Yes  Does the patient have their discharge instructions? : Yes  Does the patient have questions regarding their discharge instructions? : No  Were you started on any new medications or were there changes to any of your previous medications? : Yes  Does the patient have all of their medications?: Yes  Do you have questions regarding any of your medications? : No  Do you have all of your needed medical supplies or equipment (DME)?  (i.e. oxygen tank, CPAP, cane, etc.): Yes    Post-op ([facility] Only)  If the patient had a surgery or procedure, do they have any questions for a nurse?: No           Follow up Plan     No future appointments.  Outpatient Plan as outlined on AVS reviewed with patient.    Follow-up and recommended labs and tests  Follow up with primary care provider, Physician No Ref-Primary, within 7 days for hospital follow- up. No follow up labs or  test are needed.      For any urgent concerns, please contact our 24 hour nurse triage line: [phone]     CAROLINA McguireW  [phone]  [facility]

## 2024-09-22 ENCOUNTER — HEALTH MAINTENANCE LETTER (OUTPATIENT)
Age: 84
End: 2024-09-22

## 2024-09-30 LAB
ATRIAL RATE - MUSE: 75 BPM
DIASTOLIC BLOOD PRESSURE - MUSE: NORMAL MMHG
INTERPRETATION ECG - MUSE: NORMAL
P AXIS - MUSE: 50 DEGREES
PR INTERVAL - MUSE: 188 MS
QRS DURATION - MUSE: 168 MS
QT - MUSE: 430 MS
QTC - MUSE: 480 MS
R AXIS - MUSE: -74 DEGREES
SYSTOLIC BLOOD PRESSURE - MUSE: NORMAL MMHG
T AXIS - MUSE: 87 DEGREES
VENTRICULAR RATE- MUSE: 75 BPM

## 2025-02-11 ENCOUNTER — LAB REQUISITION (OUTPATIENT)
Dept: LAB | Facility: CLINIC | Age: 85
End: 2025-02-11
Payer: COMMERCIAL

## 2025-02-11 DIAGNOSIS — I10 ESSENTIAL (PRIMARY) HYPERTENSION: ICD-10-CM

## 2025-02-12 ENCOUNTER — LAB REQUISITION (OUTPATIENT)
Dept: LAB | Facility: CLINIC | Age: 85
End: 2025-02-12
Payer: COMMERCIAL

## 2025-02-12 LAB
ANION GAP SERPL CALCULATED.3IONS-SCNC: 11 MMOL/L (ref 7–15)
BUN SERPL-MCNC: 26.5 MG/DL (ref 8–23)
C DIFF TOX B STL QL: NEGATIVE
CALCIUM SERPL-MCNC: 8.9 MG/DL (ref 8.8–10.4)
CHLORIDE SERPL-SCNC: 100 MMOL/L (ref 98–107)
CREAT SERPL-MCNC: 1.21 MG/DL (ref 0.67–1.17)
EGFRCR SERPLBLD CKD-EPI 2021: 59 ML/MIN/1.73M2
ERYTHROCYTE [DISTWIDTH] IN BLOOD BY AUTOMATED COUNT: 16.6 % (ref 10–15)
GLUCOSE SERPL-MCNC: 86 MG/DL (ref 70–99)
HCO3 SERPL-SCNC: 26 MMOL/L (ref 22–29)
HCT VFR BLD AUTO: 38.3 % (ref 40–53)
HGB BLD-MCNC: 12.3 G/DL (ref 13.3–17.7)
MCH RBC QN AUTO: 31.4 PG (ref 26.5–33)
MCHC RBC AUTO-ENTMCNC: 32.1 G/DL (ref 31.5–36.5)
MCV RBC AUTO: 98 FL (ref 78–100)
PLATELET # BLD AUTO: 182 10E3/UL (ref 150–450)
POTASSIUM SERPL-SCNC: 4.3 MMOL/L (ref 3.4–5.3)
RBC # BLD AUTO: 3.92 10E6/UL (ref 4.4–5.9)
SODIUM SERPL-SCNC: 137 MMOL/L (ref 135–145)
WBC # BLD AUTO: 6.7 10E3/UL (ref 4–11)

## 2025-02-12 PROCEDURE — 36415 COLL VENOUS BLD VENIPUNCTURE: CPT | Mod: ORL | Performed by: NURSE PRACTITIONER

## 2025-02-12 PROCEDURE — 87493 C DIFF AMPLIFIED PROBE: CPT | Mod: ORL | Performed by: PHYSICIAN ASSISTANT

## 2025-02-12 PROCEDURE — P9604 ONE-WAY ALLOW PRORATED TRIP: HCPCS | Mod: ORL | Performed by: NURSE PRACTITIONER

## 2025-02-12 PROCEDURE — 85027 COMPLETE CBC AUTOMATED: CPT | Mod: ORL | Performed by: NURSE PRACTITIONER

## 2025-02-12 PROCEDURE — 80048 BASIC METABOLIC PNL TOTAL CA: CPT | Mod: ORL | Performed by: NURSE PRACTITIONER

## 2025-02-18 ENCOUNTER — LAB REQUISITION (OUTPATIENT)
Dept: LAB | Facility: CLINIC | Age: 85
End: 2025-02-18
Payer: COMMERCIAL

## 2025-02-18 DIAGNOSIS — R79.9 ABNORMAL FINDING OF BLOOD CHEMISTRY, UNSPECIFIED: ICD-10-CM

## 2025-02-18 DIAGNOSIS — Z51.81 ENCOUNTER FOR THERAPEUTIC DRUG LEVEL MONITORING: ICD-10-CM

## 2025-02-18 DIAGNOSIS — Z13.41 ENCOUNTER FOR AUTISM SCREENING: ICD-10-CM

## 2025-02-19 LAB
ANION GAP SERPL CALCULATED.3IONS-SCNC: 10 MMOL/L (ref 7–15)
BUN SERPL-MCNC: 23 MG/DL (ref 8–23)
CALCIUM SERPL-MCNC: 8.3 MG/DL (ref 8.8–10.4)
CHLORIDE SERPL-SCNC: 101 MMOL/L (ref 98–107)
CREAT SERPL-MCNC: 1.11 MG/DL (ref 0.67–1.17)
EGFRCR SERPLBLD CKD-EPI 2021: 66 ML/MIN/1.73M2
ERYTHROCYTE [DISTWIDTH] IN BLOOD BY AUTOMATED COUNT: 16.2 % (ref 10–15)
GLUCOSE SERPL-MCNC: 81 MG/DL (ref 70–99)
HCO3 SERPL-SCNC: 27 MMOL/L (ref 22–29)
HCT VFR BLD AUTO: 32.5 % (ref 40–53)
HGB BLD-MCNC: 10.8 G/DL (ref 13.3–17.7)
LEVETIRACETAM SERPL-MCNC: 51.7 ÂΜG/ML (ref 10–40)
MCH RBC QN AUTO: 32.2 PG (ref 26.5–33)
MCHC RBC AUTO-ENTMCNC: 33.2 G/DL (ref 31.5–36.5)
MCV RBC AUTO: 97 FL (ref 78–100)
PLATELET # BLD AUTO: 141 10E3/UL (ref 150–450)
POTASSIUM SERPL-SCNC: 3.9 MMOL/L (ref 3.4–5.3)
RBC # BLD AUTO: 3.35 10E6/UL (ref 4.4–5.9)
SODIUM SERPL-SCNC: 138 MMOL/L (ref 135–145)
TSH SERPL DL<=0.005 MIU/L-ACNC: 3.54 UIU/ML (ref 0.3–4.2)
VIT B12 SERPL-MCNC: 543 PG/ML (ref 232–1245)
WBC # BLD AUTO: 7 10E3/UL (ref 4–11)

## 2025-02-19 PROCEDURE — 36415 COLL VENOUS BLD VENIPUNCTURE: CPT | Mod: ORL | Performed by: NURSE PRACTITIONER

## 2025-02-19 PROCEDURE — 82607 VITAMIN B-12: CPT | Mod: ORL | Performed by: NURSE PRACTITIONER

## 2025-02-19 PROCEDURE — 85027 COMPLETE CBC AUTOMATED: CPT | Mod: ORL | Performed by: NURSE PRACTITIONER

## 2025-02-19 PROCEDURE — 80048 BASIC METABOLIC PNL TOTAL CA: CPT | Mod: ORL | Performed by: NURSE PRACTITIONER

## 2025-02-19 PROCEDURE — 80177 DRUG SCRN QUAN LEVETIRACETAM: CPT | Mod: ORL | Performed by: NURSE PRACTITIONER

## 2025-02-19 PROCEDURE — 84443 ASSAY THYROID STIM HORMONE: CPT | Mod: ORL | Performed by: NURSE PRACTITIONER

## 2025-02-19 PROCEDURE — P9603 ONE-WAY ALLOW PRORATED MILES: HCPCS | Mod: ORL | Performed by: NURSE PRACTITIONER

## 2025-02-19 PROCEDURE — 80164 ASSAY DIPROPYLACETIC ACD TOT: CPT | Mod: ORL | Performed by: NURSE PRACTITIONER

## 2025-02-20 LAB — VALPROATE SERPL-MCNC: 56.1 UG/ML

## 2025-02-25 ENCOUNTER — LAB REQUISITION (OUTPATIENT)
Dept: LAB | Facility: CLINIC | Age: 85
End: 2025-02-25
Payer: COMMERCIAL

## 2025-02-25 DIAGNOSIS — R82.90 UNSPECIFIED ABNORMAL FINDINGS IN URINE: ICD-10-CM

## 2025-02-25 DIAGNOSIS — Z51.81 ENCOUNTER FOR THERAPEUTIC DRUG LEVEL MONITORING: ICD-10-CM

## 2025-02-25 LAB
ALBUMIN UR-MCNC: NEGATIVE MG/DL
APPEARANCE UR: CLEAR
BILIRUB UR QL STRIP: NEGATIVE
CAOX CRY #/AREA URNS HPF: ABNORMAL /HPF
COLOR UR AUTO: YELLOW
GLUCOSE UR STRIP-MCNC: NEGATIVE MG/DL
HGB UR QL STRIP: NEGATIVE
KETONES UR STRIP-MCNC: NEGATIVE MG/DL
LEUKOCYTE ESTERASE UR QL STRIP: NEGATIVE
MUCOUS THREADS #/AREA URNS LPF: PRESENT /LPF
NITRATE UR QL: NEGATIVE
PH UR STRIP: 5.5 [PH] (ref 5–7)
RBC URINE: <1 /HPF
SP GR UR STRIP: 1.02 (ref 1–1.03)
UROBILINOGEN UR STRIP-MCNC: NORMAL MG/DL
WBC URINE: 2 /HPF

## 2025-02-25 PROCEDURE — 87086 URINE CULTURE/COLONY COUNT: CPT | Mod: ORL | Performed by: PHYSICIAN ASSISTANT

## 2025-02-25 PROCEDURE — 81001 URINALYSIS AUTO W/SCOPE: CPT | Mod: ORL | Performed by: PHYSICIAN ASSISTANT

## 2025-02-26 LAB
ALBUMIN SERPL BCG-MCNC: 2.6 G/DL (ref 3.5–5.2)
ALP SERPL-CCNC: 37 U/L (ref 40–150)
ALT SERPL W P-5'-P-CCNC: 11 U/L (ref 0–70)
AST SERPL W P-5'-P-CCNC: 18 U/L (ref 0–45)
BILIRUB DIRECT SERPL-MCNC: 0.16 MG/DL (ref 0–0.3)
BILIRUB SERPL-MCNC: 0.4 MG/DL
PROT SERPL-MCNC: 5 G/DL (ref 6.4–8.3)

## 2025-02-26 PROCEDURE — 36415 COLL VENOUS BLD VENIPUNCTURE: CPT | Mod: ORL | Performed by: NURSE PRACTITIONER

## 2025-02-26 PROCEDURE — P9604 ONE-WAY ALLOW PRORATED TRIP: HCPCS | Mod: ORL | Performed by: NURSE PRACTITIONER

## 2025-02-26 PROCEDURE — 80076 HEPATIC FUNCTION PANEL: CPT | Mod: ORL | Performed by: NURSE PRACTITIONER

## 2025-02-27 LAB — BACTERIA UR CULT: NORMAL

## 2025-03-26 ENCOUNTER — LAB REQUISITION (OUTPATIENT)
Dept: LAB | Facility: CLINIC | Age: 85
End: 2025-03-26
Payer: COMMERCIAL

## 2025-03-26 DIAGNOSIS — R19.7 DIARRHEA, UNSPECIFIED: ICD-10-CM

## 2025-03-27 ENCOUNTER — LAB REQUISITION (OUTPATIENT)
Dept: LAB | Facility: CLINIC | Age: 85
End: 2025-03-27
Payer: COMMERCIAL

## 2025-03-27 DIAGNOSIS — Z20.828 CONTACT WITH AND (SUSPECTED) EXPOSURE TO OTHER VIRAL COMMUNICABLE DISEASES: ICD-10-CM

## 2025-05-04 ENCOUNTER — LAB REQUISITION (OUTPATIENT)
Dept: LAB | Facility: CLINIC | Age: 85
End: 2025-05-04
Payer: COMMERCIAL

## 2025-05-04 DIAGNOSIS — B97.4 RESPIRATORY SYNCYTIAL VIRUS AS THE CAUSE OF DISEASES CLASSIFIED ELSEWHERE: ICD-10-CM

## 2025-05-04 DIAGNOSIS — J10.1 INFLUENZA DUE TO OTHER IDENTIFIED INFLUENZA VIRUS WITH OTHER RESPIRATORY MANIFESTATIONS: ICD-10-CM

## 2025-05-04 LAB
FLUAV RNA SPEC QL NAA+PROBE: NEGATIVE
FLUBV RNA RESP QL NAA+PROBE: NEGATIVE
RSV RNA SPEC NAA+PROBE: NEGATIVE
SARS-COV-2 RNA RESP QL NAA+PROBE: NEGATIVE

## 2025-05-04 PROCEDURE — 87637 SARSCOV2&INF A&B&RSV AMP PRB: CPT | Mod: ORL | Performed by: NURSE PRACTITIONER
